# Patient Record
Sex: FEMALE | Race: WHITE | NOT HISPANIC OR LATINO | Employment: OTHER | URBAN - METROPOLITAN AREA
[De-identification: names, ages, dates, MRNs, and addresses within clinical notes are randomized per-mention and may not be internally consistent; named-entity substitution may affect disease eponyms.]

---

## 2017-07-24 ENCOUNTER — APPOINTMENT (OUTPATIENT)
Dept: WOMENS IMAGING | Facility: HOSPITAL | Age: 62
End: 2017-07-24

## 2017-07-24 ENCOUNTER — OFFICE VISIT (OUTPATIENT)
Dept: OBSTETRICS AND GYNECOLOGY | Age: 62
End: 2017-07-24

## 2017-07-24 ENCOUNTER — PROCEDURE VISIT (OUTPATIENT)
Dept: OBSTETRICS AND GYNECOLOGY | Age: 62
End: 2017-07-24

## 2017-07-24 DIAGNOSIS — Z11.51 SPECIAL SCREENING EXAMINATION FOR HUMAN PAPILLOMAVIRUS (HPV): ICD-10-CM

## 2017-07-24 DIAGNOSIS — Z78.0 POST-MENOPAUSAL: Primary | ICD-10-CM

## 2017-07-24 DIAGNOSIS — Z12.4 ROUTINE CERVICAL SMEAR: ICD-10-CM

## 2017-07-24 DIAGNOSIS — I10 ESSENTIAL HYPERTENSION: Primary | ICD-10-CM

## 2017-07-24 DIAGNOSIS — Z00.00 ANNUAL PHYSICAL EXAM: ICD-10-CM

## 2017-07-24 PROCEDURE — 99396 PREV VISIT EST AGE 40-64: CPT | Performed by: OBSTETRICS & GYNECOLOGY

## 2017-07-24 PROCEDURE — 77080 DXA BONE DENSITY AXIAL: CPT | Performed by: OBSTETRICS & GYNECOLOGY

## 2017-07-24 PROCEDURE — 77067 SCR MAMMO BI INCL CAD: CPT | Performed by: RADIOLOGY

## 2017-07-24 PROCEDURE — G0202 SCR MAMMO BI INCL CAD: HCPCS | Performed by: RADIOLOGY

## 2017-07-24 NOTE — PROGRESS NOTES
Mague Forte is a 62 y.o. female is being seen today for No chief complaint on file.  .    History of Present Illness  Patient is here for an annual check.  Overall she's doing well and has had the same significant other for 26 years.  Her son is 33 lives with her but is still fairly independent.  Her brother lives at 2 miles down the road and they are pretty close.  She has no particular complaints today is been a year and a half.  Nothing during that timeframe medically.  No new family history.  The following portions of the patient's history were reviewed and updated as appropriate: allergies, current medications, past family history, past medical history, past social history, past surgical history and problem list.    There were no vitals filed for this visit.    PAST MEDICAL HISTORY  No past medical history on file.  OB History     No data available        No past surgical history on file.  No family history on file.  History   Smoking Status   • Not on file   Smokeless Tobacco   • Not on file     No current outpatient prescriptions on file.    There is no immunization history on file for this patient.    Review of Systems   Constitutional: Negative for chills, fatigue, fever and unexpected weight change.   Respiratory: Negative for shortness of breath and wheezing.    Cardiovascular: Negative for chest pain.   Gastrointestinal: Negative for abdominal distention, abdominal pain, blood in stool, constipation, diarrhea and nausea.   Genitourinary: Negative for difficulty urinating, dyspareunia, dysuria, frequency, hematuria, menstrual problem, pelvic pain, urgency and vaginal discharge.   Skin: Negative for rash.       Objective   Physical Exam   Constitutional: She is oriented to person, place, and time. Vital signs are normal. She appears well-developed and well-nourished.   Neck: No thyromegaly present.   Cardiovascular: Normal rate, regular rhythm and normal heart sounds.    Pulmonary/Chest:  Effort normal. Right breast exhibits no inverted nipple, no mass, no nipple discharge, no skin change and no tenderness. Left breast exhibits no inverted nipple, no mass, no nipple discharge, no skin change and no tenderness. Breasts are symmetrical. There is no breast swelling.   Abdominal: Soft.   Genitourinary: Vagina normal and uterus normal. No breast tenderness, discharge or bleeding. Pelvic exam was performed with patient supine. No labial fusion. There is no rash, tenderness, lesion or injury on the right labia. There is no rash, tenderness, lesion or injury on the left labia. Cervix exhibits no motion tenderness, no discharge and no friability. Right adnexum displays no mass, no tenderness and no fullness. Left adnexum displays no mass, no tenderness and no fullness.   Neurological: She is alert and oriented to person, place, and time.   Psychiatric: She has a normal mood and affect.   Vitals reviewed.        Assessment/Plan   Diagnoses and all orders for this visit:    Essential hypertension    Annual physical exam    Routine cervical smear  -     IGP, Apt HPV,rfx 16 / 18,45    Special screening examination for human papillomavirus (HPV)  -     IGP, Apt HPV,rfx 16 / 18,45        The exam is normal today.  I did a Pap smear today.  Bone density was also done today and in the normal range with a slight curvature to the spine.  Recommended colonoscopy again today.  And she is on her way to get a mammogram also today.  Come back in a year.  Patient was counseled as far as diet and exercise

## 2017-07-26 LAB
CYTOLOGIST CVX/VAG CYTO: NORMAL
CYTOLOGY CVX/VAG DOC THIN PREP: NORMAL
DX ICD CODE: NORMAL
HIV 1 & 2 AB SER-IMP: NORMAL
HPV I/H RISK 4 DNA CVX QL PROBE+SIG AMP: NEGATIVE
OTHER STN SPEC: NORMAL
PATH REPORT.FINAL DX SPEC: NORMAL
STAT OF ADQ CVX/VAG CYTO-IMP: NORMAL

## 2018-01-02 ENCOUNTER — OFFICE VISIT CONVERTED (OUTPATIENT)
Dept: FAMILY MEDICINE CLINIC | Age: 63
End: 2018-01-02
Attending: FAMILY MEDICINE

## 2018-03-14 ENCOUNTER — OFFICE VISIT CONVERTED (OUTPATIENT)
Dept: FAMILY MEDICINE CLINIC | Age: 63
End: 2018-03-14
Attending: FAMILY MEDICINE

## 2018-03-27 ENCOUNTER — CONVERSION ENCOUNTER (OUTPATIENT)
Dept: FAMILY MEDICINE CLINIC | Age: 63
End: 2018-03-27

## 2018-03-28 LAB
ALBUMIN SERPL-MCNC: 4.6 G/DL
ALBUMIN/GLOB SERPL: 1.9 {RATIO}
ALP SERPL-CCNC: 74 IU/L
ALT SERPL-CCNC: 16 IU/L
AST SERPL-CCNC: 20 IU/L
BILIRUB SERPL-MCNC: 0.4 MG/DL
BUN SERPL-MCNC: 15 MG/DL
BUN/CREAT SERPL: 19
CALCIUM SERPL-MCNC: 9.9 MG/DL
CHLORIDE SERPL-SCNC: 103 MMOL/L
CHOLEST SERPL-MCNC: 180 MG/DL
CO2 SERPL-SCNC: 23 MMOL/L
CONV TOTAL PROTEIN: 7 G/DL
CREAT UR-MCNC: 0.8 MG/DL
GLOBULIN UR ELPH-MCNC: 2.4 G/DL
GLUCOSE SERPL-MCNC: 94 MG/DL
HCV AB SER DONR QL: <0.1 S/CO RATIO
HDLC SERPL-MCNC: 64 MG/DL
LDLC SERPL CALC-MCNC: 96 MG/DL
POTASSIUM SERPL-SCNC: 4.5 MMOL/L
SODIUM SERPL-SCNC: 143 MMOL/L
TRIGL SERPL-MCNC: 100 MG/DL
TSH SERPL-ACNC: 1.41 UIU/ML
VLDLC SERPL-MCNC: 20 MG/DL

## 2018-07-31 ENCOUNTER — OFFICE VISIT (OUTPATIENT)
Dept: OBSTETRICS AND GYNECOLOGY | Age: 63
End: 2018-07-31

## 2018-07-31 ENCOUNTER — APPOINTMENT (OUTPATIENT)
Dept: WOMENS IMAGING | Facility: HOSPITAL | Age: 63
End: 2018-07-31

## 2018-07-31 VITALS
SYSTOLIC BLOOD PRESSURE: 138 MMHG | DIASTOLIC BLOOD PRESSURE: 80 MMHG | HEIGHT: 64 IN | WEIGHT: 224 LBS | BODY MASS INDEX: 38.24 KG/M2

## 2018-07-31 DIAGNOSIS — Z00.00 ANNUAL PHYSICAL EXAM: Primary | ICD-10-CM

## 2018-07-31 PROCEDURE — 77063 BREAST TOMOSYNTHESIS BI: CPT | Performed by: RADIOLOGY

## 2018-07-31 PROCEDURE — G0101 CA SCREEN;PELVIC/BREAST EXAM: HCPCS | Performed by: OBSTETRICS & GYNECOLOGY

## 2018-07-31 PROCEDURE — MDREVIEWSP: Performed by: RADIOLOGY

## 2018-07-31 PROCEDURE — 77067 SCR MAMMO BI INCL CAD: CPT | Performed by: RADIOLOGY

## 2018-07-31 RX ORDER — PREDNISONE 20 MG/1
TABLET ORAL
COMMUNITY
Start: 2018-07-13 | End: 2019-10-21

## 2018-07-31 RX ORDER — LISINOPRIL 10 MG/1
10 TABLET ORAL DAILY
COMMUNITY
Start: 2018-07-19 | End: 2022-02-14 | Stop reason: SDUPTHER

## 2018-07-31 RX ORDER — EPINEPHRINE 0.3 MG/.3ML
INJECTION SUBCUTANEOUS AS NEEDED
COMMUNITY
Start: 2018-07-13

## 2018-07-31 RX ORDER — MONTELUKAST SODIUM 10 MG/1
10 TABLET ORAL AS NEEDED
COMMUNITY
Start: 2018-07-13

## 2018-07-31 RX ORDER — AMOXICILLIN 875 MG/1
TABLET, COATED ORAL
COMMUNITY
Start: 2018-06-13 | End: 2018-07-31

## 2018-07-31 RX ORDER — RANITIDINE 150 MG/1
TABLET ORAL
COMMUNITY
Start: 2018-07-13 | End: 2018-07-31

## 2018-07-31 NOTE — PROGRESS NOTES
Subjective   Bethanie Forte is a 63 y.o. female is being seen today for an annual exam.  Chief Complaint   Patient presents with   • Gynecologic Exam     last pap 2017 Dexa 2017 Colon pt says she is over due this year   .    History of Present Illness    The following portions of the patient's history were reviewed and updated as appropriate: allergies, current medications, past family history, past medical history, past social history, past surgical history and problem list.    Vitals:    07/31/18 1108   BP: 138/80       PAST MEDICAL HISTORY  History reviewed. No pertinent past medical history.  OB History     No data available        History reviewed. No pertinent surgical history.  History reviewed. No pertinent family history.  History   Smoking Status   • Never Smoker   Smokeless Tobacco   • Never Used       Current Outpatient Prescriptions:   •  amoxicillin (AMOXIL) 875 MG tablet, , Disp: , Rfl:   •  EPINEPHrine (EPIPEN) 0.3 MG/0.3ML solution auto-injector injection, , Disp: , Rfl:   •  lisinopril (PRINIVIL,ZESTRIL) 10 MG tablet, , Disp: , Rfl:   •  montelukast (SINGULAIR) 10 MG tablet, , Disp: , Rfl:   •  predniSONE (DELTASONE) 20 MG tablet, , Disp: , Rfl:   •  raNITIdine (ZANTAC) 150 MG tablet, , Disp: , Rfl:     There is no immunization history on file for this patient.    Review of Systems    Objective   Physical Exam      Assessment/Plan   There are no diagnoses linked to this encounter.

## 2018-07-31 NOTE — PROGRESS NOTES
Subjective   Bethanie Forte is a 63 y.o. female is being seen today for   Chief Complaint   Patient presents with   • Gynecologic Exam     last pap 2017 Dexa 2017 Colon pt says she is over due this year   .    History of Present Illness  Patient is here for an annual exam.  Overall she doing well and her son is also doing well working very hard right now 33 and no significant other for him.  The patient's doing well with her significant other after 27 years.  No specific complaints today and the family history Brother with Parkinson's disease.  Her bowels bladder work well no other complaints  The following portions of the patient's history were reviewed and updated as appropriate: allergies, current medications, past family history, past medical history, past social history, past surgical history and problem list.    Vitals:    07/31/18 1108   BP: 138/80       PAST MEDICAL HISTORY  History reviewed. No pertinent past medical history.  OB History     No data available        History reviewed. No pertinent surgical history.  History reviewed. No pertinent family history.  History   Smoking Status   • Never Smoker   Smokeless Tobacco   • Never Used       Current Outpatient Prescriptions:   •  EPINEPHrine (EPIPEN) 0.3 MG/0.3ML solution auto-injector injection, , Disp: , Rfl:   •  lisinopril (PRINIVIL,ZESTRIL) 10 MG tablet, , Disp: , Rfl:   •  montelukast (SINGULAIR) 10 MG tablet, , Disp: , Rfl:   •  predniSONE (DELTASONE) 20 MG tablet, , Disp: , Rfl:     There is no immunization history on file for this patient.    Review of Systems   Constitutional: Negative for chills, fatigue, fever and unexpected weight change.   Respiratory: Negative for shortness of breath and wheezing.    Cardiovascular: Negative for chest pain.   Gastrointestinal: Negative for abdominal distention, abdominal pain, blood in stool, constipation, diarrhea and nausea.   Genitourinary: Negative for difficulty urinating, dyspareunia, dysuria,  frequency, hematuria, menstrual problem, pelvic pain, urgency and vaginal discharge.   Skin: Negative for rash.       Objective   Physical Exam   Constitutional: She is oriented to person, place, and time. Vital signs are normal. She appears well-developed and well-nourished.   Neck: No thyromegaly present.   Cardiovascular: Normal rate, regular rhythm and normal heart sounds.    Pulmonary/Chest: Effort normal. Right breast exhibits no inverted nipple, no mass, no nipple discharge, no skin change and no tenderness. Left breast exhibits no inverted nipple, no mass, no nipple discharge, no skin change and no tenderness. Breasts are symmetrical. There is no breast swelling.   Abdominal: Soft.   Genitourinary: Vagina normal and uterus normal. No breast tenderness, discharge or bleeding. Pelvic exam was performed with patient supine. No labial fusion. There is no rash, tenderness, lesion or injury on the right labia. There is no rash, tenderness, lesion or injury on the left labia. Cervix exhibits no motion tenderness, no discharge and no friability. Right adnexum displays no mass, no tenderness and no fullness. Left adnexum displays no mass, no tenderness and no fullness.   Neurological: She is alert and oriented to person, place, and time.   Psychiatric: She has a normal mood and affect.   Vitals reviewed.        Assessment/Plan   Bethanie was seen today for gynecologic exam.    Diagnoses and all orders for this visit:    Annual physical exam      Normal exam today.  Pap smear was done last year so not today.  Again she is due for colonoscopy.  Mammogram was done today.  And bone density was last year.  Exercise diet discussed come back in year

## 2018-09-04 ENCOUNTER — OFFICE VISIT CONVERTED (OUTPATIENT)
Dept: FAMILY MEDICINE CLINIC | Age: 63
End: 2018-09-04
Attending: FAMILY MEDICINE

## 2019-02-25 ENCOUNTER — HOSPITAL ENCOUNTER (OUTPATIENT)
Dept: OTHER | Facility: HOSPITAL | Age: 64
Discharge: HOME | End: 2019-02-25
Attending: FAMILY MEDICINE

## 2019-02-25 LAB
ALBUMIN SERPL-MCNC: 4.4 G/DL (ref 3.5–5)
ALBUMIN/GLOB SERPL: 1.6 {RATIO} (ref 1.4–2.6)
ALP SERPL-CCNC: 76 U/L (ref 43–160)
ALT SERPL-CCNC: 18 U/L (ref 10–40)
ANION GAP SERPL CALC-SCNC: 15 MMOL/L (ref 8–19)
AST SERPL-CCNC: 19 U/L (ref 15–50)
BILIRUB SERPL-MCNC: 0.37 MG/DL (ref 0.2–1.3)
BUN SERPL-MCNC: 14 MG/DL (ref 5–25)
BUN/CREAT SERPL: 18 {RATIO} (ref 6–20)
CALCIUM SERPL-MCNC: 9.7 MG/DL (ref 8.7–10.4)
CHLORIDE SERPL-SCNC: 104 MMOL/L (ref 99–111)
CHOLEST SERPL-MCNC: 184 MG/DL (ref 107–200)
CHOLEST/HDLC SERPL: 2.6 {RATIO} (ref 3–6)
CONV CO2: 27 MMOL/L (ref 22–32)
CONV TOTAL PROTEIN: 7.2 G/DL (ref 6.3–8.2)
CREAT UR-MCNC: 0.76 MG/DL (ref 0.5–0.9)
GFR SERPLBLD BASED ON 1.73 SQ M-ARVRAT: >60 ML/MIN/{1.73_M2}
GLOBULIN UR ELPH-MCNC: 2.8 G/DL (ref 2–3.5)
GLUCOSE SERPL-MCNC: 99 MG/DL (ref 65–99)
HDLC SERPL-MCNC: 70 MG/DL (ref 40–60)
LDLC SERPL CALC-MCNC: 93 MG/DL (ref 70–100)
OSMOLALITY SERPL CALC.SUM OF ELEC: 295 MOSM/KG (ref 273–304)
POTASSIUM SERPL-SCNC: 4.2 MMOL/L (ref 3.5–5.3)
SODIUM SERPL-SCNC: 142 MMOL/L (ref 135–147)
TRIGL SERPL-MCNC: 107 MG/DL (ref 40–150)
VLDLC SERPL-MCNC: 21 MG/DL (ref 5–37)

## 2019-03-04 ENCOUNTER — OFFICE VISIT CONVERTED (OUTPATIENT)
Dept: FAMILY MEDICINE CLINIC | Age: 64
End: 2019-03-04
Attending: FAMILY MEDICINE

## 2019-09-09 ENCOUNTER — HOSPITAL ENCOUNTER (OUTPATIENT)
Dept: OTHER | Facility: HOSPITAL | Age: 64
Discharge: HOME | End: 2019-09-09
Attending: FAMILY MEDICINE

## 2019-09-09 LAB
25(OH)D3 SERPL-MCNC: 41.9 NG/ML (ref 30–100)
ALBUMIN SERPL-MCNC: 4.5 G/DL (ref 3.5–5)
ALBUMIN/GLOB SERPL: 1.7 {RATIO} (ref 1.4–2.6)
ALP SERPL-CCNC: 76 U/L (ref 43–160)
ALT SERPL-CCNC: 18 U/L (ref 10–40)
ANION GAP SERPL CALC-SCNC: 17 MMOL/L (ref 8–19)
AST SERPL-CCNC: 19 U/L (ref 15–50)
BILIRUB SERPL-MCNC: 0.27 MG/DL (ref 0.2–1.3)
BUN SERPL-MCNC: 16 MG/DL (ref 5–25)
BUN/CREAT SERPL: 23 {RATIO} (ref 6–20)
CALCIUM SERPL-MCNC: 9.9 MG/DL (ref 8.7–10.4)
CHLORIDE SERPL-SCNC: 104 MMOL/L (ref 99–111)
CHOLEST SERPL-MCNC: 184 MG/DL (ref 107–200)
CHOLEST/HDLC SERPL: 3.1 {RATIO} (ref 3–6)
CONV CO2: 25 MMOL/L (ref 22–32)
CONV TOTAL PROTEIN: 7.2 G/DL (ref 6.3–8.2)
CREAT UR-MCNC: 0.7 MG/DL (ref 0.5–0.9)
GFR SERPLBLD BASED ON 1.73 SQ M-ARVRAT: >60 ML/MIN/{1.73_M2}
GLOBULIN UR ELPH-MCNC: 2.7 G/DL (ref 2–3.5)
GLUCOSE SERPL-MCNC: 98 MG/DL (ref 65–99)
HDLC SERPL-MCNC: 59 MG/DL (ref 40–60)
LDLC SERPL CALC-MCNC: 87 MG/DL (ref 70–100)
OSMOLALITY SERPL CALC.SUM OF ELEC: 295 MOSM/KG (ref 273–304)
POTASSIUM SERPL-SCNC: 4.1 MMOL/L (ref 3.5–5.3)
SODIUM SERPL-SCNC: 142 MMOL/L (ref 135–147)
TRIGL SERPL-MCNC: 190 MG/DL (ref 40–150)
VLDLC SERPL-MCNC: 38 MG/DL (ref 5–37)

## 2019-09-17 ENCOUNTER — OFFICE VISIT CONVERTED (OUTPATIENT)
Dept: FAMILY MEDICINE CLINIC | Age: 64
End: 2019-09-17
Attending: FAMILY MEDICINE

## 2019-10-16 ENCOUNTER — TELEPHONE (OUTPATIENT)
Dept: OBSTETRICS AND GYNECOLOGY | Age: 64
End: 2019-10-16

## 2019-10-21 ENCOUNTER — APPOINTMENT (OUTPATIENT)
Dept: WOMENS IMAGING | Facility: HOSPITAL | Age: 64
End: 2019-10-21

## 2019-10-21 ENCOUNTER — OFFICE VISIT (OUTPATIENT)
Dept: OBSTETRICS AND GYNECOLOGY | Age: 64
End: 2019-10-21

## 2019-10-21 VITALS
WEIGHT: 239.4 LBS | SYSTOLIC BLOOD PRESSURE: 118 MMHG | DIASTOLIC BLOOD PRESSURE: 82 MMHG | HEIGHT: 64 IN | BODY MASS INDEX: 40.87 KG/M2

## 2019-10-21 DIAGNOSIS — N32.81 OAB (OVERACTIVE BLADDER): ICD-10-CM

## 2019-10-21 DIAGNOSIS — Z00.00 ANNUAL PHYSICAL EXAM: Primary | ICD-10-CM

## 2019-10-21 PROCEDURE — G0101 CA SCREEN;PELVIC/BREAST EXAM: HCPCS | Performed by: OBSTETRICS & GYNECOLOGY

## 2019-10-21 PROCEDURE — 77067 SCR MAMMO BI INCL CAD: CPT | Performed by: RADIOLOGY

## 2019-10-21 NOTE — PROGRESS NOTES
Subjective   Bethanie Forte is a 64 y.o. female is being seen today for   Chief Complaint   Patient presents with   • Gynecologic Exam     Pap 2017, MG 2018, DEXA 2017, C-scope DUE   .    History of Present Illness  Patient is here for her annual check.  Her boys 35 and doing well as is she.  Nothing to report this year does have an element of OAB we talked about that in basic terms.  Nothing new in the family nothing during the year no complaints today  The following portions of the patient's history were reviewed and updated as appropriate: allergies, current medications, past family history, past medical history, past social history, past surgical history and problem list.    Vitals:    10/21/19 1336   BP: 118/82       PAST MEDICAL HISTORY  History reviewed. No pertinent past medical history.  OB History     No data available        History reviewed. No pertinent surgical history.  History reviewed. No pertinent family history.  Social History     Tobacco Use   Smoking Status Never Smoker   Smokeless Tobacco Never Used       Current Outpatient Medications:   •  Calcium 150 MG tablet, Take  by mouth., Disp: , Rfl:   •  Cholecalciferol (VITAMIN D3) 1000 units capsule, Take  by mouth., Disp: , Rfl:   •  EPINEPHrine (EPIPEN) 0.3 MG/0.3ML solution auto-injector injection, , Disp: , Rfl:   •  lisinopril (PRINIVIL,ZESTRIL) 10 MG tablet, , Disp: , Rfl:   •  montelukast (SINGULAIR) 10 MG tablet, , Disp: , Rfl:     There is no immunization history on file for this patient.    Review of Systems   Constitutional: Negative for chills, fatigue, fever and unexpected weight change.   Respiratory: Negative for shortness of breath and wheezing.    Cardiovascular: Negative for chest pain.   Gastrointestinal: Negative for abdominal distention, abdominal pain, blood in stool, constipation, diarrhea and nausea.   Genitourinary: Negative for difficulty urinating, dyspareunia, dysuria, frequency, hematuria, menstrual problem, pelvic  pain, urgency and vaginal discharge.   Skin: Negative for rash.       Objective   Physical Exam   Constitutional: She is oriented to person, place, and time. Vital signs are normal. She appears well-developed and well-nourished.   Neck: No thyromegaly present.   Cardiovascular: Normal rate, regular rhythm and normal heart sounds.   Pulmonary/Chest: Effort normal. Right breast exhibits no inverted nipple, no mass, no nipple discharge, no skin change and no tenderness. Left breast exhibits no inverted nipple, no mass, no nipple discharge, no skin change and no tenderness. Breasts are symmetrical. There is no breast swelling.   Abdominal: Soft.   Genitourinary: Vagina normal and uterus normal. No breast tenderness, discharge or bleeding. Pelvic exam was performed with patient supine. No labial fusion. There is no rash, tenderness, lesion or injury on the right labia. There is no rash, tenderness, lesion or injury on the left labia. Cervix exhibits no motion tenderness, no discharge and no friability. Right adnexum displays no mass, no tenderness and no fullness. Left adnexum displays no mass, no tenderness and no fullness.   Neurological: She is alert and oriented to person, place, and time.   Psychiatric: She has a normal mood and affect.   Vitals reviewed.        Assessment/Plan   Bethanie was seen today for gynecologic exam.    Diagnoses and all orders for this visit:    Annual physical exam    OAB (overactive bladder)      Normal exam today.  Pap smear will be due next year.  She has had a mammogram today.  Bone density 17 good on the year or 2 and again I talked with colonoscopy as she is closer to having that done.  Talked about trying to stay active losing some weight come back in a year

## 2019-10-23 ENCOUNTER — TELEPHONE (OUTPATIENT)
Dept: OBSTETRICS AND GYNECOLOGY | Age: 64
End: 2019-10-23

## 2019-10-23 DIAGNOSIS — R92.8 ABNORMAL MAMMOGRAM: Primary | ICD-10-CM

## 2019-11-08 ENCOUNTER — APPOINTMENT (OUTPATIENT)
Dept: WOMENS IMAGING | Facility: HOSPITAL | Age: 64
End: 2019-11-08

## 2019-11-08 PROCEDURE — 77065 DX MAMMO INCL CAD UNI: CPT | Performed by: RADIOLOGY

## 2019-11-11 ENCOUNTER — TELEPHONE (OUTPATIENT)
Dept: OBSTETRICS AND GYNECOLOGY | Age: 64
End: 2019-11-11

## 2019-11-11 DIAGNOSIS — R92.8 ABNORMAL MAMMOGRAM: Primary | ICD-10-CM

## 2019-12-03 ENCOUNTER — APPOINTMENT (OUTPATIENT)
Dept: WOMENS IMAGING | Facility: HOSPITAL | Age: 64
End: 2019-12-03

## 2019-12-03 PROCEDURE — 19081 BX BREAST 1ST LESION STRTCTC: CPT | Performed by: RADIOLOGY

## 2020-03-12 ENCOUNTER — HOSPITAL ENCOUNTER (OUTPATIENT)
Dept: OTHER | Facility: HOSPITAL | Age: 65
Discharge: HOME | End: 2020-03-12
Attending: FAMILY MEDICINE

## 2020-03-12 LAB
25(OH)D3 SERPL-MCNC: 46.2 NG/ML (ref 30–100)
ALBUMIN SERPL-MCNC: 4.3 G/DL (ref 3.5–5)
ALBUMIN/GLOB SERPL: 1.7 {RATIO} (ref 1.4–2.6)
ALP SERPL-CCNC: 73 U/L (ref 43–160)
ALT SERPL-CCNC: 19 U/L (ref 10–40)
ANION GAP SERPL CALC-SCNC: 18 MMOL/L (ref 8–19)
AST SERPL-CCNC: 19 U/L (ref 15–50)
BILIRUB SERPL-MCNC: 0.25 MG/DL (ref 0.2–1.3)
BUN SERPL-MCNC: 15 MG/DL (ref 5–25)
BUN/CREAT SERPL: 19 {RATIO} (ref 6–20)
CALCIUM SERPL-MCNC: 9.8 MG/DL (ref 8.7–10.4)
CHLORIDE SERPL-SCNC: 105 MMOL/L (ref 99–111)
CHOLEST SERPL-MCNC: 164 MG/DL (ref 107–200)
CHOLEST/HDLC SERPL: 2.7 {RATIO} (ref 3–6)
CONV CO2: 22 MMOL/L (ref 22–32)
CONV TOTAL PROTEIN: 6.9 G/DL (ref 6.3–8.2)
CREAT UR-MCNC: 0.77 MG/DL (ref 0.5–0.9)
GFR SERPLBLD BASED ON 1.73 SQ M-ARVRAT: >60 ML/MIN/{1.73_M2}
GLOBULIN UR ELPH-MCNC: 2.6 G/DL (ref 2–3.5)
GLUCOSE SERPL-MCNC: 101 MG/DL (ref 65–99)
HDLC SERPL-MCNC: 61 MG/DL (ref 40–60)
LDLC SERPL CALC-MCNC: 86 MG/DL (ref 70–100)
OSMOLALITY SERPL CALC.SUM OF ELEC: 293 MOSM/KG (ref 273–304)
POTASSIUM SERPL-SCNC: 4.2 MMOL/L (ref 3.5–5.3)
SODIUM SERPL-SCNC: 141 MMOL/L (ref 135–147)
TRIGL SERPL-MCNC: 85 MG/DL (ref 40–150)
VLDLC SERPL-MCNC: 17 MG/DL (ref 5–37)

## 2020-03-25 ENCOUNTER — TELEPHONE (OUTPATIENT)
Dept: OBSTETRICS AND GYNECOLOGY | Age: 65
End: 2020-03-25

## 2020-03-25 NOTE — TELEPHONE ENCOUNTER
Pt called and would like for you to call her.  Says it's not urgent, just needs a minute of your time.    [phone]

## 2020-06-04 ENCOUNTER — OFFICE VISIT CONVERTED (OUTPATIENT)
Dept: FAMILY MEDICINE CLINIC | Age: 65
End: 2020-06-04
Attending: FAMILY MEDICINE

## 2020-06-30 ENCOUNTER — TELEPHONE (OUTPATIENT)
Dept: OBSTETRICS AND GYNECOLOGY | Age: 65
End: 2020-06-30

## 2020-06-30 DIAGNOSIS — R92.8 ABNORMAL MAMMOGRAM: Primary | ICD-10-CM

## 2020-06-30 NOTE — TELEPHONE ENCOUNTER
Please call patient and remind her she needed a follow-up mammogram in June.  I will place the order

## 2020-07-14 ENCOUNTER — APPOINTMENT (OUTPATIENT)
Dept: WOMENS IMAGING | Facility: HOSPITAL | Age: 65
End: 2020-07-14

## 2020-07-14 PROCEDURE — 77065 DX MAMMO INCL CAD UNI: CPT | Performed by: RADIOLOGY

## 2020-07-14 PROCEDURE — G0279 TOMOSYNTHESIS, MAMMO: HCPCS | Performed by: RADIOLOGY

## 2020-09-30 ENCOUNTER — TELEPHONE (OUTPATIENT)
Dept: OBSTETRICS AND GYNECOLOGY | Age: 65
End: 2020-09-30

## 2020-09-30 NOTE — TELEPHONE ENCOUNTER
Please call patient and reschedule her appt with Tessa or other mid-level as she was mistakenly scheduled with me and I do not have openings as my patients are having trouble getting in. Thank you.

## 2020-10-06 ENCOUNTER — HOSPITAL ENCOUNTER (OUTPATIENT)
Dept: OTHER | Facility: HOSPITAL | Age: 65
Discharge: HOME | End: 2020-10-06
Attending: FAMILY MEDICINE

## 2020-10-06 LAB
25(OH)D3 SERPL-MCNC: 47 NG/ML (ref 30–100)
ALBUMIN SERPL-MCNC: 4.3 G/DL (ref 3.5–5)
ALBUMIN/GLOB SERPL: 1.7 {RATIO} (ref 1.4–2.6)
ALP SERPL-CCNC: 75 U/L (ref 43–160)
ALT SERPL-CCNC: 12 U/L (ref 10–40)
ANION GAP SERPL CALC-SCNC: 15 MMOL/L (ref 8–19)
AST SERPL-CCNC: 15 U/L (ref 15–50)
BILIRUB SERPL-MCNC: 0.32 MG/DL (ref 0.2–1.3)
BUN SERPL-MCNC: 13 MG/DL (ref 5–25)
BUN/CREAT SERPL: 16 {RATIO} (ref 6–20)
CALCIUM SERPL-MCNC: 9.5 MG/DL (ref 8.7–10.4)
CHLORIDE SERPL-SCNC: 107 MMOL/L (ref 99–111)
CHOLEST SERPL-MCNC: 157 MG/DL (ref 107–200)
CHOLEST/HDLC SERPL: 2.6 {RATIO} (ref 3–6)
CONV CO2: 26 MMOL/L (ref 22–32)
CONV TOTAL PROTEIN: 6.8 G/DL (ref 6.3–8.2)
CREAT UR-MCNC: 0.82 MG/DL (ref 0.5–0.9)
GFR SERPLBLD BASED ON 1.73 SQ M-ARVRAT: >60 ML/MIN/{1.73_M2}
GLOBULIN UR ELPH-MCNC: 2.5 G/DL (ref 2–3.5)
GLUCOSE SERPL-MCNC: 97 MG/DL (ref 65–99)
HDLC SERPL-MCNC: 61 MG/DL (ref 40–60)
LDLC SERPL CALC-MCNC: 79 MG/DL (ref 70–100)
OSMOLALITY SERPL CALC.SUM OF ELEC: 296 MOSM/KG (ref 273–304)
POTASSIUM SERPL-SCNC: 4.5 MMOL/L (ref 3.5–5.3)
SODIUM SERPL-SCNC: 143 MMOL/L (ref 135–147)
TRIGL SERPL-MCNC: 84 MG/DL (ref 40–150)
VLDLC SERPL-MCNC: 17 MG/DL (ref 5–37)

## 2020-10-09 ENCOUNTER — OFFICE VISIT CONVERTED (OUTPATIENT)
Dept: FAMILY MEDICINE CLINIC | Age: 65
End: 2020-10-09
Attending: FAMILY MEDICINE

## 2020-10-14 ENCOUNTER — HOSPITAL ENCOUNTER (OUTPATIENT)
Dept: OTHER | Facility: HOSPITAL | Age: 65
Discharge: HOME | End: 2020-10-14
Attending: FAMILY MEDICINE

## 2020-10-26 ENCOUNTER — OFFICE VISIT (OUTPATIENT)
Dept: OBSTETRICS AND GYNECOLOGY | Age: 65
End: 2020-10-26

## 2020-10-26 VITALS
DIASTOLIC BLOOD PRESSURE: 78 MMHG | BODY MASS INDEX: 39.65 KG/M2 | SYSTOLIC BLOOD PRESSURE: 124 MMHG | WEIGHT: 238 LBS | HEIGHT: 65 IN

## 2020-10-26 DIAGNOSIS — Z12.31 ENCOUNTER FOR SCREENING MAMMOGRAM FOR MALIGNANT NEOPLASM OF BREAST: ICD-10-CM

## 2020-10-26 DIAGNOSIS — Z01.419 WELL WOMAN EXAM WITH ROUTINE GYNECOLOGICAL EXAM: Primary | ICD-10-CM

## 2020-10-26 PROCEDURE — G0101 CA SCREEN;PELVIC/BREAST EXAM: HCPCS | Performed by: NURSE PRACTITIONER

## 2020-10-26 NOTE — PROGRESS NOTES
Subjective     Chief Complaint   Patient presents with   • Gynecologic Exam     Annual exam, pap 07/24/2017 neg/hpv neg, Dexa 07/24/2017 M/g 06/30/2020       History of Present Illness    Pt doing well  Last pap 2017  Mammo was done in June, had biopsy of right breast and was having more frequent screening  Recommended return to normal screening in 4 months so she is now due for routine annual mammo - has this scheduled for november  Dexa normal in 2017  Pt recently did cologuard and was negative  She is postmenopausal - denies vaginal bleeding  She has no concerns or complaints today    Bethanie Forte is a 65 y.o. No obstetric history on file. who presents for annual exam.    Obstetric History:  OB History    No obstetric history on file.        Menstrual History:     No LMP recorded (lmp unknown). Patient is postmenopausal.         Current contraception: post menopausal status  History of abnormal Pap smear: no  Received Gardasil immunization: no  Perform regular self breast exam: yes - regularly  Family history of uterine or ovarian cancer: no  Family History of colon cancer: no  Family history of breast cancer: no    Mammogram: up to date.  Colonoscopy: up to date. Cologuard  DEXA: up to date.    Exercise: not active  Calcium/Vitamin D: adequate intake Takes supplements    The following portions of the patient's history were reviewed and updated as appropriate: allergies, current medications, past family history, past medical history, past social history, past surgical history and problem list.    Review of Systems   Constitutional: Negative.    Respiratory: Negative.    Cardiovascular: Negative.    Gastrointestinal: Negative.    Genitourinary: Negative.    Skin: Negative.    Psychiatric/Behavioral: Negative.            Objective   Physical Exam  Constitutional:       General: She is awake.      Appearance: Normal appearance. She is well-developed. She is obese.   HENT:      Head: Normocephalic and atraumatic.       Nose: Nose normal.   Neck:      Musculoskeletal: Normal range of motion and neck supple.      Thyroid: No thyroid mass, thyromegaly or thyroid tenderness.   Cardiovascular:      Rate and Rhythm: Normal rate and regular rhythm.      Pulses: Normal pulses.      Heart sounds: Normal heart sounds.   Pulmonary:      Effort: Pulmonary effort is normal.      Breath sounds: Normal breath sounds.   Chest:      Breasts: Breasts are symmetrical.         Right: Normal. No swelling, bleeding, inverted nipple, mass, nipple discharge, skin change or tenderness.         Left: Normal. No swelling, bleeding, inverted nipple, mass, nipple discharge, skin change or tenderness.   Abdominal:      General: Abdomen is flat. Bowel sounds are normal.      Palpations: Abdomen is soft.      Tenderness: There is no abdominal tenderness.   Genitourinary:     General: Normal vulva.      Labia:         Right: No rash, tenderness, lesion or injury.         Left: No rash, tenderness, lesion or injury.       Vagina: Normal. No signs of injury. No vaginal discharge, erythema, tenderness, bleeding, lesions or prolapsed vaginal walls.      Cervix: No discharge, friability, lesion, erythema or cervical bleeding.      Uterus: Normal. Not enlarged, not tender and no uterine prolapse.       Adnexa: Right adnexa normal and left adnexa normal.        Right: No mass, tenderness or fullness.          Left: No mass, tenderness or fullness.        Rectum: Normal. No mass.   Lymphadenopathy:      Upper Body:      Right upper body: No supraclavicular adenopathy.      Left upper body: No supraclavicular adenopathy.   Skin:     General: Skin is warm and dry.   Neurological:      General: No focal deficit present.      Mental Status: She is alert and oriented to person, place, and time.   Psychiatric:         Mood and Affect: Mood normal.         Behavior: Behavior normal. Behavior is cooperative.         Thought Content: Thought content normal.         Judgment:  "Judgment normal.         /78   Ht 165.1 cm (65\")   Wt 108 kg (238 lb)   LMP  (LMP Unknown)   BMI 39.61 kg/m²     Assessment/Plan   Diagnoses and all orders for this visit:    1. Well woman exam with routine gynecological exam (Primary)  -     IgP, Aptima HPV    2. Encounter for screening mammogram for malignant neoplasm of breast  -     IgP, Aptima HPV        All questions answered.  Breast self exam technique reviewed and patient encouraged to perform self-exam monthly.  Discussed healthy lifestyle modifications.  Recommended 30 minutes of aerobic exercise five times per week.  Discussed calcium needs to prevent osteoporosis.    -Pap done  -Patient had cologuard  -Dexa up to date  -Mammo scheduled for November   -F/u yearly, sooner prn             "

## 2020-10-29 LAB
CYTOLOGIST CVX/VAG CYTO: NORMAL
CYTOLOGY CVX/VAG DOC CYTO: NORMAL
CYTOLOGY CVX/VAG DOC THIN PREP: NORMAL
DX ICD CODE: NORMAL
HIV 1 & 2 AB SER-IMP: NORMAL
HPV I/H RISK 4 DNA CVX QL PROBE+SIG AMP: NEGATIVE
OTHER STN SPEC: NORMAL
STAT OF ADQ CVX/VAG CYTO-IMP: NORMAL

## 2021-03-08 ENCOUNTER — HOSPITAL ENCOUNTER (OUTPATIENT)
Dept: VACCINE CLINIC | Facility: HOSPITAL | Age: 66
Discharge: HOME | End: 2021-03-08
Attending: INTERNAL MEDICINE

## 2021-03-29 ENCOUNTER — HOSPITAL ENCOUNTER (OUTPATIENT)
Dept: VACCINE CLINIC | Facility: HOSPITAL | Age: 66
Discharge: HOME | End: 2021-03-29
Attending: INTERNAL MEDICINE

## 2021-04-14 ENCOUNTER — OFFICE VISIT CONVERTED (OUTPATIENT)
Dept: FAMILY MEDICINE CLINIC | Age: 66
End: 2021-04-14
Attending: FAMILY MEDICINE

## 2021-04-15 ENCOUNTER — HOSPITAL ENCOUNTER (OUTPATIENT)
Dept: OTHER | Facility: HOSPITAL | Age: 66
Discharge: HOME | End: 2021-04-15
Attending: FAMILY MEDICINE

## 2021-04-20 ENCOUNTER — HOSPITAL ENCOUNTER (OUTPATIENT)
Dept: OTHER | Facility: HOSPITAL | Age: 66
Discharge: HOME | End: 2021-04-20
Attending: FAMILY MEDICINE

## 2021-04-20 LAB
25(OH)D3 SERPL-MCNC: 43.5 NG/ML (ref 30–100)
ALBUMIN SERPL-MCNC: 4.3 G/DL (ref 3.5–5)
ALBUMIN/GLOB SERPL: 1.7 {RATIO} (ref 1.4–2.6)
ALP SERPL-CCNC: 76 U/L (ref 43–160)
ALT SERPL-CCNC: 15 U/L (ref 10–40)
ANION GAP SERPL CALC-SCNC: 16 MMOL/L (ref 8–19)
AST SERPL-CCNC: 16 U/L (ref 15–50)
BILIRUB SERPL-MCNC: 0.34 MG/DL (ref 0.2–1.3)
BUN SERPL-MCNC: 15 MG/DL (ref 5–25)
BUN/CREAT SERPL: 21 {RATIO} (ref 6–20)
CALCIUM SERPL-MCNC: 9.7 MG/DL (ref 8.7–10.4)
CHLORIDE SERPL-SCNC: 106 MMOL/L (ref 99–111)
CHOLEST SERPL-MCNC: 173 MG/DL (ref 107–200)
CHOLEST/HDLC SERPL: 2.5 {RATIO} (ref 3–6)
CONV CO2: 26 MMOL/L (ref 22–32)
CONV TOTAL PROTEIN: 6.9 G/DL (ref 6.3–8.2)
CREAT UR-MCNC: 0.71 MG/DL (ref 0.5–0.9)
GFR SERPLBLD BASED ON 1.73 SQ M-ARVRAT: >60 ML/MIN/{1.73_M2}
GLOBULIN UR ELPH-MCNC: 2.6 G/DL (ref 2–3.5)
GLUCOSE SERPL-MCNC: 96 MG/DL (ref 65–99)
HDLC SERPL-MCNC: 68 MG/DL (ref 40–60)
LDLC SERPL CALC-MCNC: 88 MG/DL (ref 70–100)
OSMOLALITY SERPL CALC.SUM OF ELEC: 297 MOSM/KG (ref 273–304)
POTASSIUM SERPL-SCNC: 4.6 MMOL/L (ref 3.5–5.3)
SODIUM SERPL-SCNC: 143 MMOL/L (ref 135–147)
TRIGL SERPL-MCNC: 86 MG/DL (ref 40–150)
VLDLC SERPL-MCNC: 17 MG/DL (ref 5–37)

## 2021-05-18 NOTE — PROGRESS NOTES
Bethanie Forte 1955     Office/Outpatient Visit    Visit Date: Mon, Mar 4, 2019 09:56 am    Provider: Alivia Montano MD (Assistant: Josefa Allen MA)    Location: [facility]        Electronically signed by Alivia Montano MD on  2019 10:43:28 AM                             SUBJECTIVE:        CC:     Ms. Forte is a 63 year old White female.  This is a follow-up visit.  Patient presents today for six month follow up, also has complaints of sore throat X 1 day;         HPI: Bethanie is here for routine f/u of chronic issues.        She is on lisinopril for HTN.  BP has been well controlled.  No CP, palpitations, SOB.  Recent labs looked great.        She is getting allergy shots now.  Following with [facility].  They seem to be working pretty well for her. She has had sore throat.  She woke up with it this morning.  She is worried she might be getting sick.      ROS:     CONSTITUTIONAL:  Negative for fatigue and fever.      EYES:  Negative for blurred vision.      E/N/T:  Negative for diminished hearing and nasal congestion.      CARDIOVASCULAR:  Negative for chest pain and palpitations.      RESPIRATORY:  Negative for recent cough and dyspnea.      GASTROINTESTINAL:  Negative for abdominal pain, constipation, diarrhea, nausea and vomiting.      MUSCULOSKELETAL:  Negative for arthralgias and myalgias.          PMH/FMH/SH:     Last Reviewed on 3/04/2019 10:01 AM by Alivia Montano    Past Medical History:             GYNECOLOGICAL HISTORY:             PREVENTIVE HEALTH MAINTENANCE             Hepatitis C Medicare Screening: was last done 3/2018     MAMMOGRAM: Done within last 2 years and results in are chart was last done 2018 with normal results         Surgical History:         Procedures:    Colonoscopy ( has had one less than 10 years  ago ) her GYN orders due 2017         Family History:     Father:  at age 46; Cause of death was MI     Mother:   at age 90's;  Dementia;  RLS     Brother(s): Coronary Artery Disease     Sister(s): Hypertension     Son(s): 1 son(s) total;  Hypertension         Social History:     Occupation: Homemaker     Marital Status:      Children: 1 child         Tobacco/Alcohol/Supplements:     Last Reviewed on 3/04/2019 10:01 AM by Alivia Montano    Tobacco: She has never smoked.          Substance Abuse History:     Last Reviewed on 3/04/2019 10:01 AM by Alivia Montano    None         Mental Health History:     Last Reviewed on 3/04/2019 10:01 AM by Alivia Montano        Communicable Diseases (eg STDs):     Last Reviewed on 3/04/2019 10:01 AM by Alivia Montano            Current Problems:     Last Reviewed on 9/04/2018 10:15 AM by Alivia Montano    Anxiety     Obesity, NOS     Fatigue     Hypertension         Immunizations:     Zostavax (Zoster live) 11/3/2015         Allergies:     Last Reviewed on 9/04/2018 10:15 AM by Alivia Montano      No Known Drug Allergies.         Current Medications:     Last Reviewed on 9/04/2018 10:15 AM by Alivia Montano    Prinivil 10mg Tablet Take 1 tablet(s) by mouth daily     allergy shots (clusters) started July/2018     Calcium/Vit D     Vitamin D3 2,000IU Capsules         OBJECTIVE:        Vitals:         Current: 3/4/2019 10:00:31 AM    Ht:  5 ft, 6 in;  Wt: 236.4 lbs;  BMI: 38.2    T: 98.4 F (oral);  BP: 139/63 mm Hg (left arm, sitting);  P: 100 bpm (left arm (BP Cuff), sitting);  sCr: 0.76 mg/dL;  GFR: 91.14        Exams:     PHYSICAL EXAM:     GENERAL: vital signs recorded - well developed, well nourished;  well groomed;  no apparent distress;     EYES: extraocular movements intact; conjunctiva and cornea are normal; PERRLA;     E/N/T: OROPHARYNX:  normal mucosa, dentition, gingiva, and posterior pharynx;     RESPIRATORY: normal respiratory rate and pattern with no distress; normal breath sounds with no rales, rhonchi, wheezes or rubs;     CARDIOVASCULAR: normal rate; rhythm is  regular;  no systolic murmur; no edema;     GASTROINTESTINAL: nontender; normal bowel sounds;     MUSCULOSKELETAL: normal gait; normal overall tone         ASSESSMENT           401.1   I10  Hypertension              DDx:     477.0   J30.9  Allergies              DDx:         ORDERS:         Meds Prescribed:       Refill of: Prinivil (Lisinopril) 10mg Tablet Take 1 tablet(s) by mouth daily  #90 (Ninety) tablet(s) Refills: 1         Radiology/Test Orders:       3014F  Screening mammography results documented and reviewed (PV)1  (In-House)           Lab Orders:       APPTO  Appointment need  (In-House)                   PLAN:          Hypertension BP at goal.  Labs look great.  Refills sent.  RTC 6 months for AWV.     MIPS Vaccines Flu and Pneumonia updated in Shot record     MAMMOGRAM: Done within last 2 years and results in are chart     FOLLOW-UP: Schedule a follow-up visit in 6 months..  Medicare wellness 30 min with Charmaine           Prescriptions:       Refill of: Prinivil (Lisinopril) 10mg Tablet Take 1 tablet(s) by mouth daily  #90 (Ninety) tablet(s) Refills: 1           Orders:       APPTO  Appointment need  (In-House)         3014F  Screening mammography results documented and reviewed (PV)1  (In-House)             Patient Education Handouts:       [facility] Medication Compliance           Allergies Doing well with allergy shots.  She does have a sore throat today.  Likely allergies vs viral, but she may call me back if sx have not started to improve by next Monday.  Continue to monitor.             Patient Recommendations:        For  Hypertension:     Schedule a follow-up visit in 6 months.                APPOINTMENT INFORMATION:        Monday Tuesday Wednesday Thursday Friday Saturday Sunday            Time:___________________AM  PM   Date:_____________________             CHARGE CAPTURE           **Please note: ICD descriptions below are intended for billing purposes only and may not represent clinical  diagnoses**        Primary Diagnosis:         401.1 Hypertension            I10    Essential (primary) hypertension              Orders:          09275   Office/outpatient visit; established patient, level 4  (In-House)             APPTO   Appointment need  (In-House)             3014F   Screening mammography results documented and reviewed (PV)1  (In-House)           477.0 Allergies            J30.9    Allergic rhinitis, unspecified

## 2021-05-18 NOTE — PROGRESS NOTES
Bethanie Forte 1955     Office/Outpatient Visit    Visit Date: Tue, Sep 4, 2018 10:07 am    Provider: Alivia Montano MD (Assistant: Bethanie Caba MA)    Location: [facility]        Electronically signed by Alivia Montano MD on  2018 10:38:50 AM                             SUBJECTIVE:        CC:     Ms. Forte is a 63 year old White female.  This is a follow-up visit.  med refill;         HPI: Bethanie is here today to follow up on chronic issues.        She is on lisinopril for HTN.  BP has been well controlled.  No CP, palpitations, SOB.        She has started allergy shots recently for chronic allergies.  Following with [facility].  She really can't tell much difference yet but she is hopeful.  Her allergies have been pretty bad this.          She is due for mammogram.        She is due for Prevnar.     ROS:     CONSTITUTIONAL:  Negative for fatigue and fever.      EYES:  Negative for blurred vision.      E/N/T:  Negative for diminished hearing and nasal congestion.      CARDIOVASCULAR:  Negative for chest pain and palpitations.      RESPIRATORY:  Negative for recent cough and dyspnea.      GASTROINTESTINAL:  Negative for abdominal pain, constipation, diarrhea, nausea and vomiting.      MUSCULOSKELETAL:  Negative for arthralgias and myalgias.          PMH/FMH/SH:     Last Reviewed on 2018 10:15 AM by Alivia Montano    Past Medical History:             GYNECOLOGICAL HISTORY:             PREVENTIVE HEALTH MAINTENANCE             Hepatitis C Medicare Screening: was last done 3/2018         Surgical History:         Procedures:    Colonoscopy ( has had one less than 10 years  ago ) her GYN orders due          Family History:     Father:  at age 46; Cause of death was MI     Mother:  at age 90's;  Dementia;  RLS     Brother(s): Coronary Artery Disease     Sister(s): Hypertension     Son(s): 1 son(s) total;  Hypertension         Social History:      Occupation: Homemaker     Marital Status:      Children: 1 child         Tobacco/Alcohol/Supplements:     Last Reviewed on 9/04/2018 10:15 AM by Alivia Montano    Tobacco: She has never smoked.          Substance Abuse History:     Last Reviewed on 9/04/2018 10:15 AM by Alivia Montano    None         Mental Health History:     Last Reviewed on 9/04/2018 10:15 AM by Alivia Montano        Communicable Diseases (eg STDs):     Last Reviewed on 9/04/2018 10:15 AM by Alivia Montano            Current Problems:     Last Reviewed on 3/14/2018 02:38 PM by Alivia Montano    Anxiety     Obesity, NOS     Fatigue     Hypertension         Immunizations:     Zostavax (Zoster) 11/3/2015         Allergies:     Last Reviewed on 9/04/2018 10:09 AM by Bethanie Caba      No Known Drug Allergies.         Current Medications:     Last Reviewed on 9/04/2018 10:09 AM by Bethanie Caba    Prinivil 10mg Tablet Take 1 tablet(s) by mouth daily     Calcium/Vit D     Vitamin D3 2,000IU Capsules     allergy shots (clusters) started July/2018         OBJECTIVE:        Vitals:         Current: 9/4/2018 10:13:27 AM    Ht:  5 ft, 6 in;  Wt: 225.8 lbs;  BMI: 36.4    T: 98.1 F (oral);  BP: 113/48 mm Hg (left arm, sitting);  P: 72 bpm (left arm (BP Cuff), sitting);  sCr: 0.75 mg/dL;  GFR: 90.57        Exams:     PHYSICAL EXAM:     GENERAL: vital signs recorded - well developed, well nourished;  well groomed;  no apparent distress;     EYES: extraocular movements intact; conjunctiva and cornea are normal; PERRLA;     E/N/T: OROPHARYNX:  normal mucosa, dentition, gingiva, and posterior pharynx;     RESPIRATORY: normal respiratory rate and pattern with no distress; normal breath sounds with no rales, rhonchi, wheezes or rubs;     CARDIOVASCULAR: normal rate; rhythm is regular;  no systolic murmur; no edema;     GASTROINTESTINAL: nontender; normal bowel sounds;     MUSCULOSKELETAL: normal gait; normal overall tone         ASSESSMENT            401.1   I10  Hypertension              DDx:     300.02   F41.9  Anxiety              DDx:     477.0   J30.9  Allergies              DDx:         ORDERS:         Meds Prescribed:       Refill of: Prinivil (Lisinopril)  10mg Tablet Take 1 tablet(s) by mouth daily  #90 (Ninety) tablet(s) Refills: 1         Lab Orders:       APPTO  Appointment need  (In-House)                   PLAN:          Hypertension BP at goal.  Refills sent.  Labs UTD. RTC 6 months for Welcome to Medicare visit.         FOLLOW-UP: Schedule a follow-up visit in 6 months..  Welcome to Medicare 30 min with Charmaine           Prescriptions:       Refill of: Prinivil (Lisinopril)  10mg Tablet Take 1 tablet(s) by mouth daily  #90 (Ninety) tablet(s) Refills: 1           Orders:       APPTO  Appointment need  (In-House)             Patient Education Handouts:       [facility] Medication Compliance           Anxiety No issues.  Not currently using meds.          Allergies She would like to hold off on Prevnar today.  Will come back for this once she has completed her allergy shot clusters in 2 weeks.  Follows with [facility].             Patient Recommendations:        For  Hypertension:     Schedule a follow-up visit in 6 months.                APPOINTMENT INFORMATION:        Monday Tuesday Wednesday Thursday Friday Saturday Sunday            Time:___________________AM  PM   Date:_____________________             CHARGE CAPTURE           **Please note: ICD descriptions below are intended for billing purposes only and may not represent clinical diagnoses**        Primary Diagnosis:         401.1 Hypertension            I10    Essential (primary) hypertension              Orders:          86404   Office/outpatient visit; established patient, level 4  (In-House)             APPTO   Appointment need  (In-House)           300.02 Anxiety            F41.9    Anxiety disorder, unspecified    477.0 Allergies            J30.9    Allergic  rhinitis, unspecified

## 2021-05-18 NOTE — PROGRESS NOTES
Bethanie Forte 1955     Office/Outpatient Visit    Visit Date: Tue, Sep 17, 2019 10:20 am    Provider: Alivia Montano MD (Assistant: Josefa Allen MA)    Location: [facility]        Electronically signed by Alivia Montano MD on  09/17/2019 11:58:30 AM                             SUBJECTIVE:        CC:     Ms. Forte is a 64 year old White female.  Patient presents today for [facility] visit;         HPI:     She is due for colonoscopy.  Pap smear is no longer indicated by age and history.  She is due for mammogram, last done 7/2018 and this was normal.  She is reportedly UTD on DEXA, done last year and this was normal.  She is UTD on Zostavax (11/2015).  She is due for Prevnar, Pneumovax, Shingrix, Havrix, and Td.  Flu shot not yet indicated.  She is UTD on routine labs.     Ms. Forte is here for a Medicare wellness visit.  ADVANCE DIRECTIVES (Please update in PMH): Living will Returning to health checkup, the required HRA questions are integrated within this visit note. Family medical history and individual medical/surgical history were reviewed and updated.  A current height, weight, BMI, blood pressure, and pulse were recorded in the vitals section of the note and have been reviewed. Patient's medications, including supplements, were recorded in the chart and reviewed.  Current providers and suppliers were reviewed and updated.          Self-Assessment of Health: She rates her health as very good. She rates her confidence of being able to control/manage most of her health problems as very confident. Her physical/emotional health has limited her social activites not at all.  A review of cognitive impairment was performed, including ability to drive a car, manage finances, and any memory changes, and was found to be negative.  A review of functional ability, including bathing, dressing, walking, and urine/bowel continence as well as level of safety was performed and was found to be negative.   Falls Risk: Has not had any falls or only one fall without injury in the past year.  She denies having trouble hearing the TV/radio when others do not, having to strain to hear or understand conversations and wearing hearing aid(s).  Concerning home safety, she reports that at home she DOES have adequate lighting, a skid resistant shower/tub, handrails on stairs, functioning smoke alarms and absence of throw rugs, but not grab bars in the bath.          Immunization Status: ** >10 years since last Td booster; ** Has not received pneumococcal vaccination; ** Has not received influenza vaccine for this season; ** Has not received Prevnar 13 vaccination; Physical Activity: She exercises but less than 20 minutes 3 days per week; Type of diet patient normally eats is described as well-balanced with fruits and vegetables Tobacco: She has never smoked.  Preventative Health updated today         PHQ-9 Depression Screening: Completed form scanned and in chart; Total Score 3     ROS:     CONSTITUTIONAL:  Negative for fatigue and fever.      EYES:  Negative for blurred vision.      E/N/T:  Negative for diminished hearing and nasal congestion.      CARDIOVASCULAR:  Negative for chest pain and palpitations.      RESPIRATORY:  Negative for recent cough and dyspnea.      GASTROINTESTINAL:  Negative for abdominal pain, constipation, diarrhea, nausea and vomiting.      GENITOURINARY:  Negative for dysuria and urinary incontinence.      MUSCULOSKELETAL:  Negative for arthralgias and myalgias.      NEUROLOGICAL:  Negative for paresthesias and weakness.      PSYCHIATRIC:  Negative for anxiety, depression and sleep disturbance.          PMH/FMH/SH:     Last Reviewed on 2019 10:43 AM by Alivia Montano    Past Medical History:             GYNECOLOGICAL HISTORY:             PREVENTIVE HEALTH MAINTENANCE             Hepatitis C Medicare Screening: was last done 3/2018     MAMMOGRAM: Done within last 2 years and results in are  chart was last done 2018 with normal results     PAP SMEAR: was last done 2018 - Dr. Erwin Baig with normal results         Surgical History:         Procedures:    Colonoscopy ( has had one less than 10 years  ago ) her GYN orders due 2017         Family History:     Father:  at age 46; Cause of death was MI     Mother:  at age 90's;  Dementia;  RLS     Brother(s): Coronary Artery Disease     Sister(s): Hypertension     Son(s): 1 son(s) total;  Hypertension         Social History:     Occupation: Homemaker     Marital Status:      Children: 1 child         Tobacco/Alcohol/Supplements:     Last Reviewed on 2019 10:43 AM by Alivia Montano    Tobacco: She has never smoked.          Substance Abuse History:     Last Reviewed on 2019 10:43 AM by Alivia Montano    None         Mental Health History:     Last Reviewed on 2019 10:43 AM by Alivia Montano        Communicable Diseases (eg STDs):     Last Reviewed on 2019 10:43 AM by Alivia Montano            Current Problems:     Last Reviewed on 3/04/2019 10:01 AM by Alivia Montano    Vitamin D deficiency, unspecified     Anxiety     Obesity, NOS     Fatigue     Hypertension         Immunizations:     Zostavax (Zoster live) 11/3/2015         Allergies:     Last Reviewed on 3/04/2019 10:01 AM by Alivia Montano      No Known Drug Allergies.         Current Medications:     Last Reviewed on 3/04/2019 10:01 AM by Alivia Montano    Prinivil 10mg Tablet Take 1 tablet(s) by mouth daily     allergy shots (clusters) started 2018     Calcium/Vit D     Vitamin D3 2,000IU Capsules         OBJECTIVE:        Vitals:         Current: 2019 10:25:00 AM    Ht:  5 ft, 6 in;  Wt: 240.4 lbs;  BMI: 38.8    T: 98.5 F (oral);  BP: 145/49 mm Hg (left arm, sitting);  P: 79 bpm (left arm (BP Cuff), sitting);  sCr: 0.7 mg/dL;  GFR: 98.37    VA: 20/25 OD, 20/25 OS (near, with correction)        Repeat:     11:22:50 AM     BP:   140/64mm Hg  (left arm, sitting)         Exams:     PHYSICAL EXAM:     GENERAL: vital signs recorded - well developed, well nourished;  well groomed;  no apparent distress;     EYES: extraocular movements intact; conjunctiva and cornea are normal; PERRLA;     E/N/T: OROPHARYNX:  normal mucosa, dentition, gingiva, and posterior pharynx;     RESPIRATORY: normal respiratory rate and pattern with no distress; normal breath sounds with no rales, rhonchi, wheezes or rubs;     CARDIOVASCULAR: normal rate; rhythm is regular;  no systolic murmur; no edema;     GASTROINTESTINAL: nontender; normal bowel sounds;     MUSCULOSKELETAL: normal gait; normal overall tone     NEUROLOGIC: mental status: alert and oriented x 3; Reflexes: brachioradialis: 2+; knee jerks: 2+;     PSYCHIATRIC: appropriate affect and demeanor; normal psychomotor function; normal speech pattern;         ASSESSMENT           V70.0   Z00.00  Health checkup              DDx:     V79.0   Z13.31  Screening for depression              DDx:     V76.12   Z12.31  Screening mammogram - other              DDx:     627.9   N95.9  Postmenopausal disorder              DDx:     V76.51   Z12.11  Screening for cancer of colon              DDx:     V03.82   Z23  Vaccination against pneumococcal pneumonia              DDx:         ORDERS:         Meds Prescribed:       Refill of: Prinivil (Lisinopril) 10mg Tablet Take 1 tablet(s) by mouth daily  #90 (Ninety) tablet(s) Refills: 1       Betamethasone/Clotrimazole 0.05%/1% Cream Apply small amount to affected area bid  #15 (Fifteen) gm Refills: 0         Radiology/Test Orders:       3014F  Screening mammography results documented and reviewed (PV)1  (In-House)           Procedures Ordered:         Annual wellness visit, includes a PPPS, first visit  (In-House)           Other Orders:         Depression screen negative  (In-House)         1101F  Pt screen for fall risk; document no falls in past year or only 1 fall w/o injury in past  year (NAVARRO)  (In-House)                   PLAN:          Health checkup She is due for colonoscopy; she will get this scheduled herself.  Pap smear is no longer indicated by age and history.  She is due for mammogram, last done 7/2018 and this was normal; scheduled for Oct.  She is UTD on DEXA.  She is UTD on Zostavax (11/2015).  She is due for Prevnar, Pneumovax, Shingrix, Havrix, and Td; wants to come back next week for flu and then the following week for Prevnar.  Will get Pneumovax in 1 year.  Flu shot not yet indicated.  She is UTD on routine labs.  No fall risk, no memory issues, no signs/symptoms of depression.  She lives with her .  She is able to drive and perform ADLs/manage finances independently.  Hearing is adequate.  She has a living will and preventive services handout and safety handout were given to her.  Current doctor list updated.  RTC 6 months.     MIPS PHQ-9 Depression Screening: Completed form scanned and in chart; Total Score 3; Negative Depression Screen           Orders:         Annual wellness visit, includes a PPPS, first visit  (In-House)           Depression screen negative  (In-House)         1101F  Pt screen for fall risk; document no falls in past year or only 1 fall w/o injury in past year (NAVARRO)  (In-House)         3014F  Screening mammography results documented and reviewed (PV)1  (In-House)            Screening mammogram - other Already scheduled for Oct.          Postmenopausal disorder Done by Dr. Livingston.          Screening for cancer of colon Follows with Dr. Pennington.  She wants to schedule this herself.          Vaccination against pneumococcal pneumonia Will come back in a couple of weeks to get this (wants to get flu shot next week and had allergy shot this week).             Other Prescriptions:       Betamethasone/Clotrimazole 0.05%/1% Cream Apply small amount to affected area bid  #15 (Fifteen) gm Refills: 0       Refill of: Prinivil (Lisinopril) 10mg  Tablet Take 1 tablet(s) by mouth daily  #90 (Ninety) tablet(s) Refills: 1         CHARGE CAPTURE           **Please note: ICD descriptions below are intended for billing purposes only and may not represent clinical diagnoses**        Primary Diagnosis:         V70.0 Health checkup            Z00.00    Encounter for general adult medical examination without abnormal findings              Orders:             Annual wellness visit, includes a PPPS, first visit  (In-House)                Depression screen negative  (In-House)             1101F   Pt screen for fall risk; document no falls in past year or only 1 fall w/o injury in past year (NAVARRO)  (In-House)             3014F   Screening mammography results documented and reviewed (PV)1  (In-House)           V79.0 Screening for depression            Z13.31    Encounter for screening for depression    V76.12 Screening mammogram - other            Z12.31    Encounter for screening mammogram for malignant neoplasm of breast    627.9 Postmenopausal disorder            N95.9    Unspecified menopausal and perimenopausal disorder    V76.51 Screening for cancer of colon            Z12.11    Encounter for screening for malignant neoplasm of colon    V03.82 Vaccination against pneumococcal pneumonia            Z23    Encounter for immunization

## 2021-05-18 NOTE — PROGRESS NOTES
Bethanie Forte. 1955     Office/Outpatient Visit    Visit Date: Wed, Mar 14, 2018 01:56 pm    Provider: Alivia Montano MD (Assistant: Hanna Lua)    Location: [facility]        Electronically signed by Alivia Montano MD on  2018 03:01:02 PM                             SUBJECTIVE:        CC:     Ms. Forte is a 62 year old White female.  establish with Dr. Montano;         HPI: Bethanie is here today to establish care with me.        She is on lisinopril for HTN.  BP has been well controlled.  No CP, palpitations, SOB.         She has some anxiety and would like to try some medication.  She feels claustrophobic at times.  She does not want to take something every day.     ROS:     CONSTITUTIONAL:  Negative for fatigue and fever.      EYES:  Negative for blurred vision.      E/N/T:  Negative for diminished hearing and nasal congestion.      CARDIOVASCULAR:  Negative for chest pain and palpitations.      RESPIRATORY:  Negative for recent cough and dyspnea.      GASTROINTESTINAL:  Negative for abdominal pain, constipation, diarrhea, nausea and vomiting.      MUSCULOSKELETAL:  Negative for arthralgias and myalgias.      NEUROLOGICAL:  Negative for paresthesias and weakness.      PSYCHIATRIC:  Positive for anxiety.   Negative for depression or sleep disturbance.          PMH/FMH/SH:     Last Reviewed on 3/14/2018 02:38 PM by Alivia Montano    Past Medical History:             GYNECOLOGICAL HISTORY:             Surgical History:         Procedures:    Colonoscopy ( has had one less than 10 years  ago ) her GYN orders due 2017         Family History:     Father:  at age 46; Cause of death was MI     Mother:  at age 90's;  Dementia;  RLS     Brother(s): Coronary Artery Disease     Sister(s): Hypertension     Son(s): 1 son(s) total;  Hypertension         Social History:     Occupation: Homemaker     Marital Status:      Children: 1 child         Tobacco/Alcohol/Supplements:      Last Reviewed on 3/14/2018 02:38 PM by Alivia Montano    Tobacco: She has never smoked.          Substance Abuse History:     Last Reviewed on 3/14/2018 02:38 PM by Alivia Montano    None         Mental Health History:     Last Reviewed on 3/14/2018 02:38 PM by Alivia Montano        Communicable Diseases (eg STDs):     Last Reviewed on 3/14/2018 02:38 PM by Alivia Montano            Current Problems:     Dizziness     Obesity, NOS     Fatigue     Hypertension     Dry mouth         Immunizations:     Zostavax (Zoster) 11/3/2015         Allergies:     Last Reviewed on 3/14/2018 02:00 PM by Hanna Lua      No Known Drug Allergies.         Current Medications:     Last Reviewed on 3/14/2018 02:00 PM by Hanna Lua    Prinivil 10mg Tablet Take 1 tablet(s) by mouth daily     Calcium/Vit D     Vitamin D3 2,000IU Capsules         OBJECTIVE:        Vitals:         Current: 3/14/2018 1:59:17 PM    Ht:  5 ft, 6 in;  Wt: 230 lbs;  BMI: 37.1    T: 98.5 F (oral);  BP: 122/64 mm Hg (left arm, sitting);  P: 85 bpm (finger clip, sitting);  sCr: 0.81 mg/dL;  GFR: 85.61        Exams:     PHYSICAL EXAM:     GENERAL: vital signs recorded - well developed, well nourished;  well groomed;  no apparent distress;     EYES: extraocular movements intact; conjunctiva and cornea are normal; PERRLA;     E/N/T: OROPHARYNX:  normal mucosa, dentition, gingiva, and posterior pharynx;     RESPIRATORY: normal respiratory rate and pattern with no distress; normal breath sounds with no rales, rhonchi, wheezes or rubs;     CARDIOVASCULAR: normal rate; rhythm is regular;  no systolic murmur; no edema;     GASTROINTESTINAL: nontender; normal bowel sounds;     MUSCULOSKELETAL: normal gait; normal overall tone         ASSESSMENT           401.1   I10  Hypertension              DDx:     300.02   F41.9  Anxiety              DDx:     V73.99   Z11.59  Screening test for viral disease - unspecified              DDx:         ORDERS:         Meds  Prescribed:       Refill of: Prinivil (Lisinopril) 10mg Tablet Take 1 tablet(s) by mouth daily  #90 (Ninety) tablet(s) Refills: 1       Hydroxyzine HCl 10mg Tablet 1 tab bid prn for anxiety  #30 (Thirty) tablet(s) Refills: 0         Lab Orders:       [id number]  [id number] [facility] Comp Metabolic Panel (14)  (Send-Out)         [id number]  [id number] [facility] Lipid Panel (Excludes LDL/HDL ratio)  (Send-Out)         72940  [id number]  (Send-Out)         [id number]  [id number] [facility] TSH  (Send-Out)                   PLAN:          Hypertension BP at goal.  Refills sent.  Checking labs.  RTC 6 months.     LABORATORY:  Labs ordered to be performed today at [facility] include.  CMP Lipid panel TSH     FOLLOW-UP: Schedule a follow-up visit in 6 months..            Prescriptions:       Refill of: Prinivil (Lisinopril) 10mg Tablet Take 1 tablet(s) by mouth daily  #90 (Ninety) tablet(s) Refills: 1           Orders:       [id number]  [id number] [facility] Comp Metabolic Panel (14)  (Send-Out)         [id number]  [id number] [facility] Lipid Panel (Excludes LDL/HDL ratio)  (Send-Out)         02086  [id number] [facility] TSH  (Send-Out)             Patient Education Handouts:       [facility] [place]           Anxiety Trial of hydroxyzine prn.  Anxiety is fairly mild and infrequent.           Prescriptions:       Hydroxyzine HCl 10mg Tablet 1 tab bid prn for anxiety  #30 (Thirty) tablet(s) Refills: 0          Screening test for viral disease - unspecified     LABORATORY:  Labs ordered to be performed today at [facility] include Hepatitis C Screening.            Orders:       [id number]  [id number] [facility] Hepatitic C (HCV) Antibody Medicare screen  (Send-Out)               Patient Recommendations:        For  Hypertension:     Schedule a follow-up visit in 6 months.                APPOINTMENT INFORMATION:        Monday Tuesday Wednesday Thursday Friday Saturday Sunday            Time:___________________AM  PM   Date:_____________________              CHARGE CAPTURE           **Please note: ICD descriptions below are intended for billing purposes only and may not represent clinical diagnoses**        Primary Diagnosis:         401.1 Hypertension            I10    Essential (primary) hypertension              Orders:          [id number]   Office/outpatient visit; established patient, level 4  (In-House)           300.02 Anxiety            F41.9    Anxiety disorder, unspecified    V73.99 Screening test for viral disease - unspecified            Z11.59    Encounter for screening for other viral diseases

## 2021-05-18 NOTE — PROGRESS NOTES
Bethanie Forte A  1955     Office/Outpatient Visit    Visit Date:  09:36 am    Provider: Alivia Montano MD (Assistant: Nakia Reich MA)    Location: [facility]        Electronically signed by Alivia Montano MD on  2020 10:12:44 AM                             Subjective:        CC: [phone] - dox video Ms. Forte is a 64 year old White female.  This is a follow-up visit.  check up;         HPI: Bethanie is here today to follow up on chronic issues.        She is on lisinopril for HTN.  BP has been well controlled.  No CP, palpitations, SOB.  Recent labs done looked good.        She is getting allergy shots weekly, following with [facility].          She is on vitamin D supplement (as well as Jose A/vit D) for vit D deficiency.          PHQ-9 Depression Screening: Completed form scanned and in chart; Total Score 2     ROS:     CONSTITUTIONAL:  Negative for fatigue and fever.      EYES:  Negative for blurred vision.      E/N/T:  Negative for diminished hearing and nasal congestion.      CARDIOVASCULAR:  Negative for chest pain and palpitations.      RESPIRATORY:  Negative for recent cough and dyspnea.      GASTROINTESTINAL:  Negative for abdominal pain, constipation, diarrhea, nausea and vomiting.      GENITOURINARY:  Negative for dysuria and urinary incontinence.      MUSCULOSKELETAL:  Negative for arthralgias and myalgias.      NEUROLOGICAL:  Negative for paresthesias and weakness.      PSYCHIATRIC:  Negative for anxiety, depression and sleep disturbance.          Past Medical History / Family History / Social History:         Last Reviewed on 2020 09:43 AM by Alivia Montano    Past Medical History:             GYNECOLOGICAL HISTORY:             PREVENTIVE HEALTH MAINTENANCE             Hepatitis C Medicare Screening: was last done 3/2018     MAMMOGRAM: Done within last 2 years and results in are chart was last done 2019 with the  following abnormalities noted-- R breast dxtic mammo -- BIRADS 4, suspicious calcfications -- bx recommended: benign fibroadenoma The next one is due  R breast dxtic mammo tickled for 6 months     PAP SMEAR: was last done 2018 - Dr. Erwin Baig with normal results         Surgical History:         Procedures:    Colonoscopy ( has had one less than 10 years  ago ) her GYN orders due 2017         Family History:     Father:  at age 46; Cause of death was MI     Mother:  at age 90's;  Dementia;  RLS     Brother(s): Coronary Artery Disease     Sister(s): Hypertension     Son(s): 1 son(s) total;  Hypertension         Social History:     Occupation: Homemaker     Marital Status:      Children: 1 child         Tobacco/Alcohol/Supplements:     Last Reviewed on 2020 09:43 AM by Alivia Montano    Tobacco: She has never smoked.          Substance Abuse History:     Last Reviewed on 2020 09:43 AM by Alivia Montano    None         Mental Health History:     Last Reviewed on 2020 09:43 AM by Alivia Montano        Communicable Diseases (eg STDs):     Last Reviewed on 2020 09:43 AM by Alivia Montano        Current Problems:     Last Reviewed on 2019 10:43 AM by Alivia Montano    HTN - Essential (primary) hypertension    Other fatigue    Anxiety disorder, unspecified    Vitamin D deficiency, unspecified        Immunizations:     Zostavax (Zoster live) 11/3/2015        Allergies:     Last Reviewed on 2019 10:43 AM by Alivia Montano    No Known Allergies.        Current Medications:     Last Reviewed on 2019 10:43 AM by Alivia Montano    Prinivil 10 mg oral tablet [Take 1 tablet(s) by mouth daily]    Calcium/Vit D      Vitamin D3 50 mcg (2,000 unit) oral capsule    allergy shots (clusters) started 2018         Objective:        Exams:     PHYSICAL EXAM:     GENERAL:  well developed and nourished; appropriately groomed; in no apparent distress;     EYES: extraocular  movements intact; conjunctiva and cornea are normal;     RESPIRATORY: normal respiratory rate and pattern with no distress;     MUSCULOSKELETAL: normal overall tone     NEUROLOGIC: mental status: alert and oriented x 3;     PSYCHIATRIC: appropriate affect and demeanor; normal psychomotor function; normal speech pattern;         Assessment:         I10   HTN - Essential (primary) hypertension       J30.9   Allergic rhinitis, unspecified       Z13.31   Encounter for screening for depression       Z12.11   Encounter for screening for malignant neoplasm of colon           ORDERS:         Meds Prescribed:       [Refilled] Prinivil 10 mg oral tablet [Take 1 tablet(s) by mouth daily], #90 (ninety) tablets, Refills: 2 (two)         Lab Orders:       APPTO  Appointment need  (In-House)              Other Orders:         Depression screen negative  (In-House)              Screening mammogram results documented  (Send-Out)            1124F  Advance Care Planning discussed and doc in MR; no surrogate named or advance care plan provided  (Send-Out)                      Plan:         HTN - Essential (primary) hypertensionBP at goal.  Refills sent.  Labs reviewed and UTD.  RTC 4 months for AWV.    [facility] PHQ-9 Depression Screening: Completed form scanned and in chart; Total Score 2; Negative Depression Screen Telehealth: Verbal consent obtained for visit to occur via televideo conferencing; Staff, other than provider, present during telephone visit include Nakia Reich MA     FOLLOW-UP: Schedule a follow-up visit in 4 months.:.  Medicare wellness 30 min with [place]          Prescriptions:       [Refilled] Prinivil 10 mg oral tablet [Take 1 tablet(s) by mouth daily], #90 (ninety) tablets, Refills: 2 (two)           Orders:         Depression screen negative  (In-House)              Screening mammogram results documented  (Send-Out)            1124F  Advance Care Planning discussed and doc in MR; no surrogate  named or advance care plan provided  (Send-Out)            APPTO  Appointment need  (In-House)              Allergic rhinitis, unspecifiedStable.  Follows for allergy shots with [facility].        Encounter for screening for malignant neoplasm of colonJessica is nervous about going in right now, but we discussed that the summer is probably a good time to get this done.  She will think about it and can call for a referral at any time.            Patient Recommendations:        For  HTN - Essential (primary) hypertension:    Schedule a follow-up visit in 4 months.                APPOINTMENT INFORMATION:        Monday Tuesday Wednesday Thursday Friday Saturday Sunday            Time:___________________AM  PM   Date:_____________________             Charge Capture:         Primary Diagnosis:     I10  HTN - Essential (primary) hypertension           Orders:      99962  Office/outpatient visit; established patient, level 4  (In-House)              Depression screen negative  (In-House)            APPTO  Appointment need  (In-House)              J30.9  Allergic rhinitis, unspecified     Z13.31  Encounter for screening for depression     Z12.11  Encounter for screening for malignant neoplasm of colon

## 2021-05-18 NOTE — PROGRESS NOTES
"Bethanie Forte A  1955     Office/Outpatient Visit    Visit Date:  09:20 am    Provider: Alivia Montano MD (Assistant: Angelique Gaona RN)    Location: [facility]        Electronically signed by Alivia Montano MD on  2021 10:42:28 AM                             Subjective:        CC: Ms. Forte is a 65 year old White female.  6 month follow up;         HPI: Bethanie is here today for f/u on chronic issues.        She is on lisinopril for HTN.  BP has been well controlled.  No CP, palpitations, SOB.  Recent labs done looked good.        She is getting allergy shots weekly, following with [facility].          She is on vitamin D supplement (as well as Jose A/vit D) for vit D deficiency.    ROS:     CONSTITUTIONAL:  Positive for fatigue.   Negative for fever.      EYES:  Negative for blurred vision.      E/N/T:  Negative for diminished hearing and nasal congestion.      CARDIOVASCULAR:  Negative for chest pain and palpitations.      RESPIRATORY:  Positive for recent cough ( (improving) ).   Negative for dyspnea.      GASTROINTESTINAL:  Negative for abdominal pain, constipation, diarrhea, nausea and vomiting.      MUSCULOSKELETAL:  Negative for arthralgias and myalgias.      NEUROLOGICAL:  Positive for headaches ( \"sinus\" ).          Past Medical History / Family History / Social History:         Last Reviewed on 2021 09:29 AM by Alivia Montano    Past Medical History:             GYNECOLOGICAL HISTORY:             PREVENTIVE HEALTH MAINTENANCE             COLORECTAL CANCER SCREENING: Up to date (colonoscopy q10y; sigmoidoscopy q5y; Cologuard q3y) was last done 10/13/2020, Results are in chart; FIT Hemocult performed: negative 10/13/20     Hepatitis C Medicare Screening: was last done 3/2018     MAMMOGRAM: Done within last 2 years and results in are chart was last done 2019 screening; 2020 diagnostic with normal results The next one is " due  2020     PAP SMEAR: was last done 2018 - Dr. Erwin Baig with normal results         Surgical History:         Procedures:    Colonoscopy ( has had one less than 10 years  ago ) her GYN orders due          Family History:     Father:  at age 46; Cause of death was MI     Mother:  at age 90's;  Dementia;  RLS     Brother(s): Coronary Artery Disease     Sister(s): Hypertension     Son(s): 1 son(s) total;  Hypertension         Social History:     Occupation: Homemaker     Marital Status:      Children: 1 child         Tobacco/Alcohol/Supplements:     Last Reviewed on 2021 09:29 AM by Alivia Montano    Tobacco: She has never smoked.          Substance Abuse History:     Last Reviewed on 2021 09:29 AM by Alivia Montano    None         Mental Health History:     Last Reviewed on 2021 09:29 AM by Alivia Montano        Communicable Diseases (eg STDs):     Last Reviewed on 2021 09:29 AM by Alivia Montano        Current Problems:     Last Reviewed on 10/09/2020 02:01 PM by Alivia Montano    HTN - Essential (primary) hypertension    Other fatigue    Anxiety disorder, unspecified    Vitamin D deficiency, unspecified    Allergic rhinitis, unspecified    Encounter for screening for malignant neoplasm of colon    Other abnormal and inconclusive findings on diagnostic imaging of breast    Encounter for general adult medical examination without abnormal findings    Encounter for immunization    Encounter for other screening for malignant neoplasm of breast        Immunizations:     Pneumococcal conjugate PCV 13 (PREVNAR 13) 10/9/2020    Zostavax (Zoster live) 11/3/2015        Allergies:     Last Reviewed on 2021 09:22 AM by Angelique Gaona    No Known Allergies.        Current Medications:     Last Reviewed on 10/09/2020 02:01 PM by Alivia Montano    Calcium/Vit D      Vitamin D3 50 mcg (2,000 unit) oral capsule    allergy shots (clusters) started 2018      lisinopriL 10 mg oral tablet [take 1 tablet (10 mg) by oral route once daily]        Objective:        Vitals:         Current: 4/14/2021 9:25:22 AM    Ht:  5 ft, 6 in;  Wt: 240.6 lbs;  BMI: 38.8T: 96.4 F (temporal);  BP: 128/76 mm Hg (left arm, sitting);  P: 82 bpm (left arm (BP Cuff), sitting);  sCr: 0.82 mg/dL;  GFR: 82.91        Exams:     PHYSICAL EXAM:     GENERAL: vital signs recorded - well developed, well nourished;  well groomed;  no apparent distress;     EYES: extraocular movements intact; conjunctiva and cornea are normal; PERRLA;     NECK: firm round mass inferior to R maxilla, nontender;     RESPIRATORY: normal respiratory rate and pattern with no distress; normal breath sounds with no rales, rhonchi, wheezes or rubs;     CARDIOVASCULAR: normal rate; rhythm is regular;  no systolic murmur; no edema;     GASTROINTESTINAL: nontender; normal bowel sounds;     MUSCULOSKELETAL: normal gait; normal overall tone         Assessment:         I10   HTN - Essential (primary) hypertension       E55.9   Vitamin D deficiency, unspecified       J30.9   Allergic rhinitis, unspecified       R05   Cough       Z12.39   Encounter for other screening for malignant neoplasm of breast       R22.1   Localized swelling, mass and lump, neck           ORDERS:         Meds Prescribed:       [Refilled] lisinopriL 10 mg oral tablet [take 1 tablet (10 mg) by oral route once daily], #90 (ninety) tablets, Refills: 1 (one)       [New Rx] promethazine-DM 6.25-15 mg/5 mL oral Syrup [take 5 milliliters by oral route  QHS prn cough; caution (sedation)], #118 (one hundred and eighteen) milliliters, Refills: 0 (zero)       [New Rx] Tessalon Perles 100 mg oral capsule [take 1 capsule (100 mg) by oral route 3 times per day as needed for cough], #30 (thirty) capsules, Refills: 0 (zero)         Radiology/Test Orders:       3017F  Colorectal CA screen results documented and reviewed (PV)  (In-House)            23790  US, soft tissues of head &  neck (eg, thyroid, parathyroid, parotid), real time w/image documentation  (Send-Out)              Lab Orders:       [id number]  VITD - [facility] Vitamin D, 25 Hydroxy  (Send-Out)            54168  HTNLP - [facility] CMP AND LIPID: [id number], [id number]  (Send-Out)            APPTO  Appointment need  (In-House)              Other Orders:         Screening mammogram results documented  (Send-Out)            1124F  Advance Care Planning discussed and doc in MR; no surrogate named or advance care plan provided  (Send-Out)                      Plan:         HTN - Essential (primary) hypertensionBP at goal.  Continue lisinopril 10 mg daily.  No refills needed.  Checking labs.  RTC 6 months for AWV.    LABORATORY:  Labs ordered to be performed today include HTN/Lipid Panel: CMP, Lipid.  MIPS Vaccines Flu and Pneumonia updated in Shot record Screening mammomgram done within last 2 years and results in are chart Colorectal Cancer Screening is up to date and the results are in the chart Advance Directive/Surrogate Decision Maker discussed and pt declines to complete today     FOLLOW-UP: Schedule a follow-up visit in 6 months.:.  Medicare wellness 30 min with [place]          Prescriptions:       [Refilled] lisinopriL 10 mg oral tablet [take 1 tablet (10 mg) by oral route once daily], #90 (ninety) tablets, Refills: 1 (one)           Orders:       [id number]  HTNLP - [facility] CMP AND LIPID: [id number], [id number]  (Send-Out)              Screening mammogram results documented  (Send-Out)            3017F  Colorectal CA screen results documented and reviewed (PV)  (In-House)            1124F  Advance Care Planning discussed and doc in MR; no surrogate named or advance care plan provided  (Send-Out)            APPTO  Appointment need  (In-House)              Vitamin D deficiency, unspecifiedChecking labs.  No refills needed.    LABORATORY:  Labs ordered to be performed today include Vitamin D.            Orders:       [id number]  VITD - [facility] Vitamin D, 25 Hydroxy   (Send-Out)              Allergic rhinitis, unspecifiedShe is on weekly allergy shots.        Cough          Prescriptions:       [New Rx] promethazine-DM 6.25-15 mg/5 mL oral Syrup [take 5 milliliters by oral route  QHS prn cough; caution (sedation)], #118 (one hundred and eighteen) milliliters, Refills: 0 (zero)       [New Rx] Tessalon Perles 100 mg oral capsule [take 1 capsule (100 mg) by oral route 3 times per day as needed for cough], #30 (thirty) capsules, Refills: 0 (zero)         Encounter for other screening for malignant neoplasm of breastShe is getting this scheduled.        Localized swelling, mass and lump, neckFirm nontender nodule inferior to R maxilla.  Evaluating with U/S.        RADIOLOGY:  I have ordered Neck Ultrasound to be done today.            Orders:       [id number]  US, soft tissues of head & neck (eg, thyroid, parathyroid, parotid), real time w/image documentation  (Send-Out)                  Patient Recommendations:        For  HTN - Essential (primary) hypertension:    Schedule a follow-up visit in 6 months.                APPOINTMENT INFORMATION:        Monday Tuesday Wednesday Thursday Friday Saturday Sunday            Time:___________________AM  PM   Date:_____________________             Charge Capture:         Primary Diagnosis:     I10  HTN - Essential (primary) hypertension           Orders:      49921  Office/outpatient visit; established patient, level 4  (In-House)            3017F  Colorectal CA screen results documented and reviewed (PV)  (In-House)            APPTO  Appointment need  (In-House)              E55.9  Vitamin D deficiency, unspecified     J30.9  Allergic rhinitis, unspecified     R05  Cough     Z12.39  Encounter for other screening for malignant neoplasm of breast     R22.1  Localized swelling, mass and lump, neck

## 2021-05-18 NOTE — PROGRESS NOTES
Bethanie Forte  1955     Office/Outpatient Visit    Visit Date: Fri, Oct 9, 2020 01:32 pm    Provider: Alivia Montano MD (Assistant: Josefa Allen MA)    Location: [facility]        Electronically signed by Alivia Montano MD on  10/09/2020 05:16:47 PM                             Subjective:        CC: Ms. Forte is a 65 year old White female.  Patient presents today for [facility] visit;         HPI:       She is due for colonoscopy.  Pap smear is no longer indicated by age and history.  She is UTD on mammogram, last done 11/2019 and this showed an abnormality that was subsequently diagnosed as benign fibroadenoma on biopsy.  She is due for DEXA.  She is UTD on Zostavax (11/2015).  She is due for Prevnar, Pneumovax, Shingrix, Havrix, Td and flu shot.  She is UTD on routine labs.     Ms. Forte is here for a Medicare wellness visit.  The required HRA questions are integrated within this visit note. Family medical history and individual medical/surgical history were reviewed and updated.  A current height, weight, BMI, blood pressure, and pulse were recorded in the vitals section of the note and have been reviewed. Patient's medications, including supplements, were recorded in the chart and reviewed.  Current providers and suppliers were reviewed and updated.          Self-Assessment of Health: She rates her health as good. She rates her confidence of being able to control/manage most of her health problems as very confident. Her physical/emotional health has limited her social activites slightly.  A review of cognitive impairment was performed, including ability to drive a car, manage finances, and any memory changes, and was found to be negative.  A review of functional ability, including bathing, dressing, walking, and urine/bowel continence as well as level of safety was performed and was found to be negative.  Falls Risk: Has not had any falls or only one fall without injury in the past  year.  She denies having trouble hearing the TV/radio when others do not, having to strain to hear or understand conversations and wearing hearing aid(s).  Concerning home safety, she reports that at home she DOES have adequate lighting, a skid resistant shower/tub, handrails on stairs and functioning smoke alarms, but not grab bars in the bath or absence of throw rugs.          Immunization Status: ** >10 years since last Td booster; ** Has not received pneumococcal vaccination; ** Has not received influenza vaccine for this season; ** Has not received Prevnar 13 vaccination; Physical Activity: She exercises but less than 20 minutes 3 days per week; Type of diet patient normally eats is described as poor--needs improvement.  Tobacco: She has never smoked.  Preventative Health updated today     ROS:     CONSTITUTIONAL:  Negative for fatigue and fever.      EYES:  Negative for blurred vision.      E/N/T:  Negative for diminished hearing and nasal congestion.      CARDIOVASCULAR:  Negative for chest pain and palpitations.      RESPIRATORY:  Negative for recent cough and dyspnea.      GASTROINTESTINAL:  Negative for abdominal pain, constipation, diarrhea, nausea and vomiting.      GENITOURINARY:  Negative for dysuria and urinary incontinence.      MUSCULOSKELETAL:  Negative for arthralgias and myalgias.      NEUROLOGICAL:  Negative for paresthesias and weakness.      PSYCHIATRIC:  Negative for anxiety, depression and sleep disturbance.          Past Medical History / Family History / Social History:         Last Reviewed on 10/09/2020 02:01 PM by Alivia Montano    Past Medical History:             GYNECOLOGICAL HISTORY:             PREVENTIVE HEALTH MAINTENANCE             Hepatitis C Medicare Screening: was last done 3/2018     MAMMOGRAM: Done within last 2 years and results in are chart was last done 2019 with the following abnormalities noted-- R breast dxtic mammo -- BIRADS 4, suspicious calcfications --  bx recommended: benign fibroadenoma The next one is due  R breast dxtic mammo tickled for 6 months     PAP SMEAR: was last done 2018 - Dr. Erwin Baig with normal results         Surgical History:         Procedures:    Colonoscopy ( has had one less than 10 years  ago ) her GYN orders due          Family History:     Father:  at age 46; Cause of death was MI     Mother:  at age 90's;  Dementia;  RLS     Brother(s): Coronary Artery Disease     Sister(s): Hypertension     Son(s): 1 son(s) total;  Hypertension         Social History:     Occupation: Homemaker     Marital Status:      Children: 1 child         Tobacco/Alcohol/Supplements:     Last Reviewed on 10/09/2020 02:01 PM by Alivia Montano    Tobacco: She has never smoked.          Substance Abuse History:     Last Reviewed on 10/09/2020 02:01 PM by Alivia Montano    None         Mental Health History:     Last Reviewed on 10/09/2020 02:01 PM by Alivia Montano        Communicable Diseases (eg STDs):     Last Reviewed on 10/09/2020 02:01 PM by Alivia Montano        Current Problems:     Last Reviewed on 2020 09:43 AM by Alivia Montano    HTN - Essential (primary) hypertension    Other fatigue    Anxiety disorder, unspecified    Vitamin D deficiency, unspecified    Allergic rhinitis, unspecified    Encounter for screening for malignant neoplasm of colon    Other abnormal and inconclusive findings on diagnostic imaging of breast        Immunizations:     Zostavax (Zoster live) 11/3/2015        Allergies:     Last Reviewed on 10/09/2020 01:35 PM by Josefa Allen    No Known Allergies.        Current Medications:     Last Reviewed on 10/09/2020 01:35 PM by Josefa Allen    Prinivil 10 mg oral tablet [Take 1 tablet(s) by mouth daily]    Calcium/Vit D      Vitamin D3 50 mcg (2,000 unit) oral capsule    allergy shots (clusters) started 2018         Objective:        Vitals:         Current: 10/9/2020 1:37:55 PM    Ht:  5 ft, 6  in;  Wt: 239.8 lbs;  BMI: 38.7T: 98 F (temporal);  BP: 141/56 mm Hg (left arm, sitting);  P: 72 bpm (left arm (BP Cuff), sitting);  sCr: 0.82 mg/dL;  GFR: 82.79VA: 20/30 OD, 20/30 OS (near, with correction)        Repeat:     2:40:31 PM  BP:   125/64mm Hg (left arm, sitting, HR: 66)     Exams:     PHYSICAL EXAM:     GENERAL: vital signs recorded - well developed, well nourished;  well groomed;  no apparent distress;     EYES: extraocular movements intact; conjunctiva and cornea are normal; PERRLA;     E/N/T: OROPHARYNX:  normal mucosa, dentition, gingiva, and posterior pharynx;     RESPIRATORY: normal respiratory rate and pattern with no distress; normal breath sounds with no rales, rhonchi, wheezes or rubs;     CARDIOVASCULAR: normal rate; rhythm is regular;  no systolic murmur; no edema;     GASTROINTESTINAL: nontender; normal bowel sounds;     MUSCULOSKELETAL: normal gait; normal overall tone     NEUROLOGIC: mental status: alert and oriented x 3; Reflexes: brachioradialis: 2+; knee jerks: 2+;     PSYCHIATRIC: appropriate affect and demeanor; normal psychomotor function; normal speech pattern;         Assessment:         Z00.00   Encounter for general adult medical examination without abnormal findings       Z12.11   Encounter for screening for malignant neoplasm of colon       Z23   Encounter for immunization           ORDERS:         Lab Orders:       APPTO  Appointment need  (In-House)            90084  [facility] Occult blood by immunoassay  (Send-Out)              Procedures Ordered:         Annual wellness visit, includes a PPPS, subsequent visit  (In-House)            05194  Pneumococcal conjugate vaccine, 13 valent, for intramuscular use  (In-House)              Other Orders:       1101F  Pt screen for fall risk; document no falls in past year or only 1 fall w/o injury in past year (NAVARRO)  (In-House)              Screening mammogram results documented  (Send-Out)            1124F  Advance Care  Planning discussed and doc in MR; no surrogate named or advance care plan provided  (Send-Out)              Depression screen negative  (In-House)              Administration of pneumococcal vaccine  (x1)                  Plan:         Encounter for general adult medical examination without abnormal findingsShe is due for colonoscopy; does not want to go to the hospital right now d/t COVID so will do FIT testing for this year instead.  Pap smear is no longer indicated by age and history.  She is UTD on mammogram, last done 11/2019 and this showed an abnormality that was subsequently diagnosed as benign fibroadenoma on biopsy.  She is reportedly UTD DEXA, [facility]i [facility]; records requested.  She is UTD on Zostavax (11/2015).  She is due for Prevnar, Pneumovax, Shingrix, Havrix, Td and flu shot.  Prevnar given today.  We are out of high dose flu, so she will look around to see if she can find a dose at the pharmacy.  [place]  Pneumovax will be given in 1 year.  She is UTD on routine labs.  No fall risk, no memory issues, no signs/symptoms of depression.  She is able to drive and perform ADLs/manage finances independently.  Hearing is adequate.  She does not have a living will.  Preventive services handout and safety handout were given to her.  Current doctor list updated.  RTC 6 months.    MIPS PHQ-9 Depression Screening: Completed form scanned and in chart; Total Score 3; Negative Depression Screen ADVANCE DIRECTIVES (Please update in PMH): Living will     FOLLOW-UP: Schedule a follow-up visit in 6 months.:.  f/u HTN with [place]          Orders:         Annual wellness visit, includes a PPPS, subsequent visit  (In-House)            1101F  Pt screen for fall risk; document no falls in past year or only 1 fall w/o injury in past year (NAVARRO)  (In-House)              Screening mammogram results documented  (Send-Out)            1124F   Advance Care Planning discussed and doc in MR; no surrogate named or advance care plan provided  (Send-Out)            APPTO  Appointment need  (In-House)              Depression screen negative  (In-House)              Encounter for screening for malignant neoplasm of colon    LABORATORY:  Labs ordered to be performed today include Hemocult, Routine Screening.            Orders:       48880  [facility] Occult blood by immunoassay  (Send-Out)              Encounter for immunization        IMMUNIZATIONS given today: Prevnar 13.            Immunizations:       30179  Pneumococcal conjugate vaccine, 13 valent, for intramuscular use  (In-House)                Dose (ml): 0.5  Site: right deltoid  Route: intramuscular  Administered by: Josefa Allen          : [employer]  Lot #: [id number]  Exp: 05/01/2022          NDC: [address]-64        Administration of pneumococcal vaccine  (x1)              Patient Recommendations:        For  Encounter for general adult medical examination without abnormal findings:    Schedule a follow-up visit in 6 months.                APPOINTMENT INFORMATION:        Monday Tuesday Wednesday Thursday Friday Saturday Sunday            Time:___________________AM  PM   Date:_____________________             Charge Capture:         Primary Diagnosis:     Z00.00  Encounter for general adult medical examination without abnormal findings           Orders:        Annual wellness visit, includes a PPPS, subsequent visit  (In-House)            1101F  Pt screen for fall risk; document no falls in past year or only 1 fall w/o injury in past year (NAVARRO)  (In-House)            APPTO  Appointment need  (In-House)              Depression screen negative  (In-House)              Z12.11  Encounter for screening for malignant neoplasm of colon     Z23  Encounter for immunization           Orders:      16268  Pneumococcal conjugate vaccine, 13 valent, for intramuscular use   (In-House)              Administration of pneumococcal vaccine  (x1)

## 2021-05-18 NOTE — PROGRESS NOTES
"Bethanie Forte. 1955     Office/Outpatient Visit    Visit Date:  03:14 pm    Provider: Mila Mendoza MD (Assistant: Chanel Mahmood MA)    Location: [facility]        Electronically signed by Mila Mendoza MD on  2018 01:32:29 PM                             SUBJECTIVE:        CC: URI symptoms         HPI:         Patient to be evaluated for uRI.  These have been present for the past one week.  The symptoms include chest congestion, dry, mild hacking cough, \"sinus\" headache, nasal congestion, purulent nasal discharge, sinus pain/pressure and tooth pain.  She denies body aches, dizziness or fever.  She denies exposure to ill contacts.  She has already tried to relieve the symptoms with tried sudafed and this affects her BP, tried NS spray, didnt try antihistamines and took left over augmentin about 4 days without improvement.  she is clausterphobic and having  a lot of anxiety over not being able to breath     ROS:     CONSTITUTIONAL:  Positive for fatigue.   Negative for fever.      E/N/T:  Positive for nasal congestion and frequent rhinorrhea.   Negative for sore throat.      CARDIOVASCULAR:  Negative for chest pain, palpitations, tachycardia, orthopnea, and edema.      RESPIRATORY:  Positive for recent cough and frequent wheezing.      MUSCULOSKELETAL:  Negative for arthralgias, back pain, and myalgias.      NEUROLOGICAL:  Negative for dizziness, headaches, paresthesias, and weakness.          PMH/FMH/SH:     Last Reviewed on 2018 03:44 PM by Mila Mendoza    Past Medical History:             GYNECOLOGICAL HISTORY:             Surgical History:         Procedures:    Colonoscopy ( has had one less than 10 years  ago ) her GYN orders due 2017         Family History:     Father:  at age 46; Cause of death was MI     Mother:  at age 90's;  Dementia;  RLS     Brother(s): Coronary Artery Disease     Sister(s): Hypertension     Son(s): 1 son(s) total;  " Hypertension         Social History:     Occupation: Homemaker     Marital Status:      Children: 1 child         Tobacco/Alcohol/Supplements:     Last Reviewed on 1/02/2018 03:44 PM by Mila Mendoza    Tobacco: She has never smoked.          Substance Abuse History:     None             Allergies:     Last Reviewed on 6/26/2017 03:09 PM by Angelique Gaona      No Known Drug Allergies.         Current Medications:     Last Reviewed on 6/26/2017 03:09 PM by Angelique Gaona    Prinivil 10mg Tablet Take 1 tablet(s) by mouth daily     Calcium/Vit D     Vitamin D3 2,000IU Capsules         OBJECTIVE:        Vitals:         Current: 1/2/2018 3:19:26 PM    Ht:  5 ft, 6 in;  Wt: 239.2 lbs;  BMI: 38.6    T: 98.7 F (oral);  BP: 132/64 mm Hg (left arm, sitting);  P: 83 bpm (left arm (BP Cuff), sitting);  sCr: 0.81 mg/dL;  GFR: 87.05        Exams:     PHYSICAL EXAM:     GENERAL: no apparent distress;     E/N/T: EARS: bilateral TMs are normal;  NOSE: nasal mucosa is erythematous;  turbinates are moderately swollen bilaterally;  bilateral maxillary sinus tenderness present; OROPHARYNX: posterior pharynx, including tonsils, tongue, and uvula are normal;     NECK: supple, no LAD/TM;     RESPIRATORY: normal respiratory rate and pattern with no distress; normal breath sounds with no rales, rhonchi, wheezes or rubs;     CARDIOVASCULAR:     LYMPHATIC: no enlargement of cervical or facial nodes;     NEUROLOGIC: mental status: alert and oriented x 3; GROSSLY INTACT     PSYCHIATRIC:  appropriate affect and demeanor; normal speech pattern; grossly normal memory;         ASSESSMENT           461.0   J01.00  Acute maxillary sinusitis              DDx:     307.41   G47.00  Insomnia              DDx:         ORDERS:         Meds Prescribed:       Mucinex DM (Dextromethorphan/Guaifenesin) 30mg/600mg Tablets, Extended Release 2 tabs q 12 hrs prn  #40 (Forty) tablet(s) Refills: 0       Fluticasone Propionate 50mcg/1actuation Nasal  Spray 1 spray each nostil daily  #16 (Sixteen) gm Refills: 0       Bactrim DS (Trimethoprim/Sulfamethoxazole ) Tablet Take 1 tablet(s) by mouth q12h for 10 days  #20 (Twenty) tablet(s) Refills: 0                 PLAN:          Acute maxillary sinusitis try nasal saline spray she is very complicated due to her claustiphobia-will have her push fluids, use a humidifier.         RECOMMENDATIONS given include: Push Fluids, Rest, Follow up if no improvement or worsening symptoms like high fevers, vomiting, weakness, or increasing shortness of air.    .            Prescriptions:       Mucinex DM (Dextromethorphan/Guaifenesin) 30mg/600mg Tablets, Extended Release 2 tabs q 12 hrs prn  #40 (Forty) tablet(s) Refills: 0       Fluticasone Propionate 50mcg/1actuation Nasal Spray 1 spray each nostil daily  #16 (Sixteen) gm Refills: 0       Bactrim DS (Trimethoprim/Sulfamethoxazole ) Tablet Take 1 tablet(s) by mouth q12h for 10 days  #20 (Twenty) tablet(s) Refills: 0          Insomnia not sleeping due to SOA and unable to breath laying down             CHARGE CAPTURE           **Please note: ICD descriptions below are intended for billing purposes only and may not represent clinical diagnoses**        Primary Diagnosis:         461.0 Acute maxillary sinusitis            J01.00    Acute maxillary sinusitis, unspecified              Orders:          04404   Office/outpatient visit; established patient, level 4  (In-House)           307.41 Insomnia            G47.00    Insomnia, unspecified

## 2021-07-01 VITALS
WEIGHT: 225.8 LBS | HEART RATE: 72 BPM | DIASTOLIC BLOOD PRESSURE: 48 MMHG | SYSTOLIC BLOOD PRESSURE: 113 MMHG | BODY MASS INDEX: 36.29 KG/M2 | TEMPERATURE: 98.1 F | HEIGHT: 66 IN

## 2021-07-01 VITALS
WEIGHT: 239.2 LBS | DIASTOLIC BLOOD PRESSURE: 64 MMHG | SYSTOLIC BLOOD PRESSURE: 132 MMHG | BODY MASS INDEX: 38.44 KG/M2 | HEIGHT: 66 IN | HEART RATE: 83 BPM | TEMPERATURE: 98.7 F

## 2021-07-01 VITALS
HEART RATE: 85 BPM | DIASTOLIC BLOOD PRESSURE: 64 MMHG | HEIGHT: 66 IN | WEIGHT: 230 LBS | BODY MASS INDEX: 36.96 KG/M2 | TEMPERATURE: 98.5 F | SYSTOLIC BLOOD PRESSURE: 122 MMHG

## 2021-07-01 VITALS
HEART RATE: 100 BPM | BODY MASS INDEX: 37.99 KG/M2 | TEMPERATURE: 98.4 F | DIASTOLIC BLOOD PRESSURE: 63 MMHG | WEIGHT: 236.4 LBS | HEIGHT: 66 IN | SYSTOLIC BLOOD PRESSURE: 139 MMHG

## 2021-07-01 VITALS
HEART RATE: 79 BPM | TEMPERATURE: 98.5 F | HEIGHT: 66 IN | WEIGHT: 240.4 LBS | DIASTOLIC BLOOD PRESSURE: 64 MMHG | SYSTOLIC BLOOD PRESSURE: 140 MMHG | BODY MASS INDEX: 38.63 KG/M2

## 2021-07-02 VITALS
SYSTOLIC BLOOD PRESSURE: 125 MMHG | DIASTOLIC BLOOD PRESSURE: 64 MMHG | HEART RATE: 72 BPM | BODY MASS INDEX: 38.54 KG/M2 | HEIGHT: 66 IN | TEMPERATURE: 98 F | WEIGHT: 239.8 LBS

## 2021-07-02 VITALS
TEMPERATURE: 96.4 F | DIASTOLIC BLOOD PRESSURE: 76 MMHG | WEIGHT: 240.6 LBS | BODY MASS INDEX: 38.67 KG/M2 | HEART RATE: 82 BPM | SYSTOLIC BLOOD PRESSURE: 128 MMHG | HEIGHT: 66 IN

## 2021-09-29 ENCOUNTER — TELEPHONE (OUTPATIENT)
Dept: FAMILY MEDICINE CLINIC | Age: 66
End: 2021-09-29

## 2021-09-29 DIAGNOSIS — E55.9 VITAMIN D DEFICIENCY: ICD-10-CM

## 2021-09-29 DIAGNOSIS — I10 ESSENTIAL HYPERTENSION: Primary | ICD-10-CM

## 2021-09-29 PROBLEM — R53.83 OTHER FATIGUE: Status: ACTIVE | Noted: 2021-09-29

## 2021-09-29 PROBLEM — F41.9 ANXIETY: Status: ACTIVE | Noted: 2021-09-29

## 2021-09-29 PROBLEM — J30.9 ALLERGIC RHINITIS: Status: ACTIVE | Noted: 2021-09-29

## 2021-10-15 ENCOUNTER — LAB (OUTPATIENT)
Dept: LAB | Facility: HOSPITAL | Age: 66
End: 2021-10-15

## 2021-10-15 DIAGNOSIS — E55.9 VITAMIN D DEFICIENCY: ICD-10-CM

## 2021-10-15 DIAGNOSIS — I10 ESSENTIAL HYPERTENSION: ICD-10-CM

## 2021-10-15 LAB
25(OH)D3 SERPL-MCNC: 49.1 NG/ML (ref 30–100)
ALBUMIN SERPL-MCNC: 4.6 G/DL (ref 3.5–5.2)
ALBUMIN/GLOB SERPL: 2.3 G/DL
ALP SERPL-CCNC: 71 U/L (ref 39–117)
ALT SERPL W P-5'-P-CCNC: 18 U/L (ref 1–33)
ANION GAP SERPL CALCULATED.3IONS-SCNC: 8.7 MMOL/L (ref 5–15)
AST SERPL-CCNC: 19 U/L (ref 1–32)
BILIRUB SERPL-MCNC: 0.4 MG/DL (ref 0–1.2)
BUN SERPL-MCNC: 15 MG/DL (ref 8–23)
BUN/CREAT SERPL: 22.7 (ref 7–25)
CALCIUM SPEC-SCNC: 9.8 MG/DL (ref 8.6–10.5)
CHLORIDE SERPL-SCNC: 106 MMOL/L (ref 98–107)
CHOLEST SERPL-MCNC: 171 MG/DL (ref 0–200)
CO2 SERPL-SCNC: 27.3 MMOL/L (ref 22–29)
CREAT SERPL-MCNC: 0.66 MG/DL (ref 0.57–1)
GFR SERPL CREATININE-BSD FRML MDRD: 89 ML/MIN/1.73
GLOBULIN UR ELPH-MCNC: 2 GM/DL
GLUCOSE SERPL-MCNC: 94 MG/DL (ref 65–99)
HDLC SERPL-MCNC: 59 MG/DL (ref 40–60)
LDLC SERPL CALC-MCNC: 94 MG/DL (ref 0–100)
LDLC/HDLC SERPL: 1.56 {RATIO}
POTASSIUM SERPL-SCNC: 3.9 MMOL/L (ref 3.5–5.2)
PROT SERPL-MCNC: 6.6 G/DL (ref 6–8.5)
SODIUM SERPL-SCNC: 142 MMOL/L (ref 136–145)
TRIGL SERPL-MCNC: 100 MG/DL (ref 0–150)
VLDLC SERPL-MCNC: 18 MG/DL (ref 5–40)

## 2021-10-15 PROCEDURE — 80061 LIPID PANEL: CPT

## 2021-10-15 PROCEDURE — 80053 COMPREHEN METABOLIC PANEL: CPT

## 2021-10-15 PROCEDURE — 82306 VITAMIN D 25 HYDROXY: CPT

## 2021-10-15 PROCEDURE — 36415 COLL VENOUS BLD VENIPUNCTURE: CPT

## 2021-10-20 ENCOUNTER — TELEPHONE (OUTPATIENT)
Dept: FAMILY MEDICINE CLINIC | Age: 66
End: 2021-10-20

## 2021-10-20 ENCOUNTER — OFFICE VISIT (OUTPATIENT)
Dept: FAMILY MEDICINE CLINIC | Age: 66
End: 2021-10-20

## 2021-10-20 VITALS
HEART RATE: 71 BPM | WEIGHT: 239.2 LBS | BODY MASS INDEX: 38.44 KG/M2 | DIASTOLIC BLOOD PRESSURE: 48 MMHG | SYSTOLIC BLOOD PRESSURE: 134 MMHG | TEMPERATURE: 98.9 F | HEIGHT: 66 IN

## 2021-10-20 DIAGNOSIS — Z12.11 COLON CANCER SCREENING: ICD-10-CM

## 2021-10-20 DIAGNOSIS — Z12.31 SCREENING MAMMOGRAM, ENCOUNTER FOR: ICD-10-CM

## 2021-10-20 DIAGNOSIS — Z00.00 ANNUAL PHYSICAL EXAM: Primary | ICD-10-CM

## 2021-10-20 DIAGNOSIS — Z23 ENCOUNTER FOR IMMUNIZATION: ICD-10-CM

## 2021-10-20 PROCEDURE — 1170F FXNL STATUS ASSESSED: CPT | Performed by: FAMILY MEDICINE

## 2021-10-20 PROCEDURE — G0439 PPPS, SUBSEQ VISIT: HCPCS | Performed by: FAMILY MEDICINE

## 2021-10-20 PROCEDURE — G0009 ADMIN PNEUMOCOCCAL VACCINE: HCPCS | Performed by: FAMILY MEDICINE

## 2021-10-20 PROCEDURE — 1159F MED LIST DOCD IN RCRD: CPT | Performed by: FAMILY MEDICINE

## 2021-10-20 PROCEDURE — 90732 PPSV23 VACC 2 YRS+ SUBQ/IM: CPT | Performed by: FAMILY MEDICINE

## 2021-10-20 NOTE — TELEPHONE ENCOUNTER
Hub to read    Pt called states she was having a reaction to her vacc, she states were she had the shot it is red and a little swollen. She is just making sure that is normal or if she was having a allergic reaction.

## 2021-10-20 NOTE — TELEPHONE ENCOUNTER
Spoke to pt and instructed her that it is a normal local reaction.  She should watch for increase in size and redness over the next few days instead of decreasing.

## 2021-10-20 NOTE — ASSESSMENT & PLAN NOTE
She is due for colonoscopy; she does do FIT testing yearly, last done 10/2020.  Pap smear is no longer indicated by age and history.  She is due for mammogram, last done 7/2020 and this was normal.  She is reportedly UTD on DEXA, done at [facility].  She is UTD on COVID (Pfizer -- needs shot #3), Prevnar (10/2020) and Zostavax (11/2015).  She is due for Pneumovax, Shingrix, Td and flu shot.  She is UTD on routine labs.

## 2021-10-20 NOTE — PROGRESS NOTES
The ABCs of the Annual Wellness Visit  Subsequent Medicare Wellness Visit    Chief Complaint   Patient presents with   • Medicare Wellness-subsequent      Subjective    History of Present Illness:  Bethanie Forte is a 66 y.o. female who presents for a Subsequent Medicare Wellness Visit.    She is due for colonoscopy; she does do FIT testing yearly, last done 10/2020.  Pap smear is no longer indicated by age and history.  She is due for mammogram, last done 7/2020 and this was normal.  She is reportedly UTD on DEXA, done at [facility] in [facility].  She is UTD on COVID (Pfizer -- needs shot #3), Prevnar (10/2020) and Zostavax (11/2015).  She is due for Pneumovax, Shingrix, Td and flu shot.  She is UTD on routine labs.     The following portions of the patient's history were reviewed and   updated as appropriate: allergies, current medications, past family history, past medical history, past social history, past surgical history and problem list.    Compared to one year ago, the patient feels her physical   health is the same.    Compared to one year ago, the patient feels her mental   health is the same.    Recent Hospitalizations:  She was not admitted to the hospital during the last year.       Current Medical Providers:  Patient Care Team:  lAivia Montano MD as PCP - General    Outpatient Medications Prior to Visit   Medication Sig Dispense Refill   • Calcium 150 MG tablet Take  by mouth.     • Cholecalciferol (VITAMIN D3) 1000 units capsule Take  by mouth.     • EPINEPHrine (EPIPEN) 0.3 MG/0.3ML solution auto-injector injection      • lisinopril (PRINIVIL,ZESTRIL) 10 MG tablet Take 10 mg by mouth Daily.     • montelukast (SINGULAIR) 10 MG tablet Take 10 mg by mouth As Needed.       No facility-administered medications prior to visit.       No opioid medication identified on active medication list. I have reviewed chart for other potential  high risk medication/s and harmful drug interactions  "in the elderly.          Aspirin is not on active medication list.  Aspirin use is not indicated based on review of current medical condition/s. Risk of harm outweighs potential benefits.  .    Patient Active Problem List   Diagnosis   • Essential hypertension   • OAB (overactive bladder)   • Vitamin D deficiency   • Anxiety   • Other fatigue   • Allergic rhinitis   • Annual physical exam     Advance Care Planning  Advance Directive is not on file.  ACP discussion was held with the patient during this visit. Patient has an advance directive (not in EMR), copy requested.    Review of Systems   Constitutional: Negative for chills, fatigue and fever.   HENT: Negative for congestion, hearing loss and rhinorrhea.    Eyes: Negative for pain and visual disturbance.   Respiratory: Negative for cough and shortness of breath.    Cardiovascular: Negative for chest pain and palpitations.   Gastrointestinal: Negative for abdominal pain, constipation, diarrhea, nausea and vomiting.   Genitourinary: Negative for difficulty urinating and dysuria.   Musculoskeletal: Negative for arthralgias and myalgias.   Neurological: Positive for numbness (L lateral thigh). Negative for weakness.   Psychiatric/Behavioral: Negative for dysphoric mood and sleep disturbance. The patient is not nervous/anxious.         Objective    Vitals:    10/20/21 0947 10/20/21 1018   BP: 145/58 134/48   BP Location: Left arm    Patient Position: Sitting    Cuff Size: Large Adult    Pulse: 81 71   Temp: 98.9 °F (37.2 °C)    TempSrc: Oral    Weight: 109 kg (239 lb 3.2 oz)    Height: 167.6 cm (65.98\")      Body mass index is 38.63 kg/m².  BMI has not been calculated during today's encounter.     Does the patient have evidence of cognitive impairment? No    Physical Exam  Lab Results   Component Value Date    TRIG 100 10/15/2021    HDL 59 10/15/2021    LDL 94 10/15/2021    VLDL 18 10/15/2021            HEALTH RISK ASSESSMENT    Smoking Status:  Social History "     Tobacco Use   Smoking Status Never Smoker   Smokeless Tobacco Never Used     Alcohol Consumption:  Social History     Substance and Sexual Activity   Alcohol Use None     Fall Risk Screen:    STEADI Fall Risk Assessment was completed, and patient is at LOW risk for falls.Assessment completed on:10/20/2021    Depression Screening:  PHQ-2/PHQ-9 Depression Screening 10/20/2021   Little interest or pleasure in doing things 0   Feeling down, depressed, or hopeless 0   Total Score 0       Health Habits and Functional and Cognitive Screening:  Functional & Cognitive Status 10/20/2021   Do you have difficulty preparing food and eating? No   Do you have difficulty bathing yourself, getting dressed or grooming yourself? No   Do you have difficulty using the toilet? No   Do you have difficulty moving around from place to place? No   Do you have trouble with steps or getting out of a bed or a chair? No   Current Diet Unhealthy Diet   Dental Exam Up to date   Eye Exam Up to date   Exercise (times per week) 1 times per week   Current Exercises Include Walking   Do you need help using the phone?  No   Are you deaf or do you have serious difficulty hearing?  No   Do you need help with transportation? No   Do you need help shopping? No   Do you need help preparing meals?  No   Do you need help with housework?  No   Do you need help with laundry? No   Do you need help taking your medications? No   Do you need help managing money? No   Do you ever drive or ride in a car without wearing a seat belt? No   Have you felt unusual stress, anger or loneliness in the last month? No   Who do you live with? Alone   If you need help, do you have trouble finding someone available to you? No   Have you been bothered in the last four weeks by sexual problems? No   Do you have difficulty concentrating, remembering or making decisions? No       Age-appropriate Screening Schedule:  Refer to the list below for future screening recommendations based  on patient's age, sex and/or medical conditions. Orders for these recommended tests are listed in the plan section. The patient has been provided with a written plan.    Health Maintenance   Topic Date Due   • TDAP/TD VACCINES (1 - Tdap) Never done   • ZOSTER VACCINE (1 of 2) Never done   • DXA SCAN  07/24/2019   • INFLUENZA VACCINE  Never done   • MAMMOGRAM  07/14/2022              Assessment/Plan   CMS Preventative Services Quick Reference  Risk Factors Identified During Encounter  Immunizations Discussed/Encouraged (specific Immunizations; Influenza, Pneumococcal 23 and COVID19  The above risks/problems have been discussed with the patient.  Follow up actions/plans if indicated are seen below in the Assessment/Plan Section.  Pertinent information has been shared with the patient in the After Visit Summary.    Diagnoses and all orders for this visit:    1. Annual physical exam (Primary)  Assessment & Plan:  She is due for colonoscopy; she does do FIT testing yearly, last done 10/2020.  Pap smear is no longer indicated by age and history.  She is due for mammogram, last done 7/2020 and this was normal.  She is reportedly UTD on DEXA, done at [facility] in [facility].  She is UTD on COVID (Pfizer -- needs shot #3), Prevnar (10/2020) and Zostavax (11/2015).  She is due for Pneumovax, Shingrix, Td and flu shot.  She is UTD on routine labs.       2. Screening mammogram, encounter for  -     Mammo Screening Digital Tomosynthesis Bilateral With CAD; Future    3. Encounter for immunization  -     Pneumococcal Polysaccharide Vaccine 23-Valent Greater Than or Equal To 3yo Subcutaneous / IM    4. Colon cancer screening  -     Occult Blood X 1, Stool - Stool, Per Rectum; Future      Follow Up:   Return in about 6 months (around 4/20/2022) for Recheck.     An After Visit Summary and PPPS were made available to the patient.

## 2021-12-01 ENCOUNTER — LAB (OUTPATIENT)
Dept: LAB | Facility: HOSPITAL | Age: 66
End: 2021-12-01

## 2021-12-01 ENCOUNTER — APPOINTMENT (OUTPATIENT)
Dept: MAMMOGRAPHY | Facility: HOSPITAL | Age: 66
End: 2021-12-01

## 2021-12-01 DIAGNOSIS — Z12.11 COLON CANCER SCREENING: Primary | ICD-10-CM

## 2021-12-01 LAB — HEMOCCULT STL QL IA: NEGATIVE

## 2021-12-01 PROCEDURE — 82274 ASSAY TEST FOR BLOOD FECAL: CPT

## 2021-12-02 ENCOUNTER — HOSPITAL ENCOUNTER (OUTPATIENT)
Dept: MAMMOGRAPHY | Facility: HOSPITAL | Age: 66
Discharge: HOME | End: 2021-12-02
Admitting: FAMILY MEDICINE

## 2021-12-02 DIAGNOSIS — Z12.31 SCREENING MAMMOGRAM, ENCOUNTER FOR: ICD-10-CM

## 2021-12-02 PROCEDURE — 77063 BREAST TOMOSYNTHESIS BI: CPT

## 2021-12-02 PROCEDURE — 77067 SCR MAMMO BI INCL CAD: CPT

## 2022-02-08 ENCOUNTER — LAB (OUTPATIENT)
Dept: LAB | Facility: HOSPITAL | Age: 67
End: 2022-02-08

## 2022-02-08 ENCOUNTER — OFFICE VISIT (OUTPATIENT)
Dept: FAMILY MEDICINE CLINIC | Age: 67
End: 2022-02-08

## 2022-02-08 VITALS
TEMPERATURE: 97.7 F | DIASTOLIC BLOOD PRESSURE: 55 MMHG | HEART RATE: 72 BPM | BODY MASS INDEX: 38.57 KG/M2 | HEIGHT: 66 IN | WEIGHT: 240 LBS | SYSTOLIC BLOOD PRESSURE: 134 MMHG

## 2022-02-08 DIAGNOSIS — F41.9 ANXIETY: Primary | ICD-10-CM

## 2022-02-08 DIAGNOSIS — I10 ESSENTIAL HYPERTENSION: ICD-10-CM

## 2022-02-08 DIAGNOSIS — K21.9 GASTROESOPHAGEAL REFLUX DISEASE WITHOUT ESOPHAGITIS: ICD-10-CM

## 2022-02-08 DIAGNOSIS — F41.9 ANXIETY: ICD-10-CM

## 2022-02-08 PROBLEM — Z00.00 ANNUAL PHYSICAL EXAM: Status: RESOLVED | Noted: 2021-10-20 | Resolved: 2022-02-08

## 2022-02-08 LAB
ALBUMIN SERPL-MCNC: 4.5 G/DL (ref 3.5–5.2)
ALBUMIN/GLOB SERPL: 1.8 G/DL
ALP SERPL-CCNC: 80 U/L (ref 39–117)
ALT SERPL W P-5'-P-CCNC: 17 U/L (ref 1–33)
ANION GAP SERPL CALCULATED.3IONS-SCNC: 6.7 MMOL/L (ref 5–15)
AST SERPL-CCNC: 14 U/L (ref 1–32)
BACTERIA UR QL AUTO: ABNORMAL /HPF
BASOPHILS # BLD AUTO: 0.02 10*3/MM3 (ref 0–0.2)
BASOPHILS NFR BLD AUTO: 0.3 % (ref 0–1.5)
BILIRUB SERPL-MCNC: 0.2 MG/DL (ref 0–1.2)
BILIRUB UR QL STRIP: NEGATIVE
BUN SERPL-MCNC: 14 MG/DL (ref 8–23)
BUN/CREAT SERPL: 20.3 (ref 7–25)
CALCIUM SPEC-SCNC: 9.9 MG/DL (ref 8.6–10.5)
CHLORIDE SERPL-SCNC: 100 MMOL/L (ref 98–107)
CLARITY UR: ABNORMAL
CO2 SERPL-SCNC: 29.3 MMOL/L (ref 22–29)
COLOR UR: YELLOW
CREAT SERPL-MCNC: 0.69 MG/DL (ref 0.57–1)
DEPRECATED RDW RBC AUTO: 42.9 FL (ref 37–54)
EOSINOPHIL # BLD AUTO: 0.08 10*3/MM3 (ref 0–0.4)
EOSINOPHIL NFR BLD AUTO: 1.2 % (ref 0.3–6.2)
ERYTHROCYTE [DISTWIDTH] IN BLOOD BY AUTOMATED COUNT: 13 % (ref 12.3–15.4)
GFR SERPL CREATININE-BSD FRML MDRD: 85 ML/MIN/1.73
GLOBULIN UR ELPH-MCNC: 2.5 GM/DL
GLUCOSE SERPL-MCNC: 98 MG/DL (ref 65–99)
GLUCOSE UR STRIP-MCNC: NEGATIVE MG/DL
HCT VFR BLD AUTO: 38.6 % (ref 34–46.6)
HGB BLD-MCNC: 12.9 G/DL (ref 12–15.9)
HGB UR QL STRIP.AUTO: NEGATIVE
IMM GRANULOCYTES # BLD AUTO: 0.01 10*3/MM3 (ref 0–0.05)
IMM GRANULOCYTES NFR BLD AUTO: 0.2 % (ref 0–0.5)
KETONES UR QL STRIP: NEGATIVE
LEUKOCYTE ESTERASE UR QL STRIP.AUTO: ABNORMAL
LYMPHOCYTES # BLD AUTO: 1.17 10*3/MM3 (ref 0.7–3.1)
LYMPHOCYTES NFR BLD AUTO: 17.6 % (ref 19.6–45.3)
MAGNESIUM SERPL-MCNC: 2 MG/DL (ref 1.6–2.4)
MCH RBC QN AUTO: 29.8 PG (ref 26.6–33)
MCHC RBC AUTO-ENTMCNC: 33.4 G/DL (ref 31.5–35.7)
MCV RBC AUTO: 89.1 FL (ref 79–97)
MONOCYTES # BLD AUTO: 0.57 10*3/MM3 (ref 0.1–0.9)
MONOCYTES NFR BLD AUTO: 8.6 % (ref 5–12)
NEUTROPHILS NFR BLD AUTO: 4.78 10*3/MM3 (ref 1.7–7)
NEUTROPHILS NFR BLD AUTO: 72.1 % (ref 42.7–76)
NITRITE UR QL STRIP: NEGATIVE
PH UR STRIP.AUTO: 6 [PH] (ref 5–8)
PLATELET # BLD AUTO: 261 10*3/MM3 (ref 140–450)
PMV BLD AUTO: 9.7 FL (ref 6–12)
POTASSIUM SERPL-SCNC: 4.2 MMOL/L (ref 3.5–5.2)
PROT SERPL-MCNC: 7 G/DL (ref 6–8.5)
PROT UR QL STRIP: NEGATIVE
RBC # BLD AUTO: 4.33 10*6/MM3 (ref 3.77–5.28)
RBC # UR STRIP: ABNORMAL /HPF
REF LAB TEST METHOD: ABNORMAL
SODIUM SERPL-SCNC: 136 MMOL/L (ref 136–145)
SP GR UR STRIP: 1.02 (ref 1–1.03)
SQUAMOUS #/AREA URNS HPF: ABNORMAL /HPF
TSH SERPL DL<=0.05 MIU/L-ACNC: 0.88 UIU/ML (ref 0.27–4.2)
UREA BREATH TEST QL: POSITIVE
UROBILINOGEN UR QL STRIP: ABNORMAL
WBC # UR STRIP: ABNORMAL /HPF
WBC NRBC COR # BLD: 6.63 10*3/MM3 (ref 3.4–10.8)

## 2022-02-08 PROCEDURE — 83735 ASSAY OF MAGNESIUM: CPT

## 2022-02-08 PROCEDURE — 84443 ASSAY THYROID STIM HORMONE: CPT

## 2022-02-08 PROCEDURE — 99214 OFFICE O/P EST MOD 30 MIN: CPT | Performed by: FAMILY MEDICINE

## 2022-02-08 PROCEDURE — 85025 COMPLETE CBC W/AUTO DIFF WBC: CPT

## 2022-02-08 PROCEDURE — 81001 URINALYSIS AUTO W/SCOPE: CPT

## 2022-02-08 PROCEDURE — 36415 COLL VENOUS BLD VENIPUNCTURE: CPT

## 2022-02-08 PROCEDURE — 83013 H PYLORI (C-13) BREATH: CPT

## 2022-02-08 PROCEDURE — 80053 COMPREHEN METABOLIC PANEL: CPT

## 2022-02-08 RX ORDER — HYDROXYZINE HYDROCHLORIDE 10 MG/1
10 TABLET, FILM COATED ORAL 2 TIMES DAILY PRN
Qty: 60 TABLET | Refills: 2 | Status: SHIPPED | OUTPATIENT
Start: 2022-02-08

## 2022-02-08 RX ORDER — FLUTICASONE PROPIONATE 50 MCG
2 SPRAY, SUSPENSION (ML) NASAL AS NEEDED
COMMUNITY
Start: 2022-01-18

## 2022-02-08 RX ORDER — HYDROXYZINE HYDROCHLORIDE 10 MG/1
10 TABLET, FILM COATED ORAL 2 TIMES DAILY PRN
COMMUNITY
End: 2022-02-08 | Stop reason: SDUPTHER

## 2022-02-08 NOTE — ASSESSMENT & PLAN NOTE
Checking H. pylori today as part of her work-up.  She may continue using famotidine over-the-counter for now.  I may consider switching her to a PPI depending on what the labs show and how her symptoms continue to evolve.

## 2022-02-08 NOTE — PROGRESS NOTES
"Chief Complaint  Anxiety (stomach issues, heart burn X4 days)    Subjective          Bethanie Forte presents to [facility] today for anxiety and heartburn for the past 4-5 days.  Initilaly sx started abruptly on last Friday.  She felt clammy and her muscles felt weak.  She was worried she might be having a stroke.  She started looking online and thought she might be having a heart attack.  That really got her even more anxious.  However, she has not had any CP, palpitations, or SOB.  No lightheadness.  Her muscles feel \"locked up, like it's hard to move.\"  +Diarrhea.      She has been taking a half-tab of hydroxyzine to see if that would help.  She does think that has helped somewhat.    She also thinks that she has been having some acid reflux.  She has had intermittent problems with this for year.  She usually takes famotidine over the counter.        Current Outpatient Medications:   •  Calcium 150 MG tablet, Take  by mouth., Disp: , Rfl:   •  Cholecalciferol (VITAMIN D3) 1000 units capsule, Take  by mouth., Disp: , Rfl:   •  EPINEPHrine (EPIPEN) 0.3 MG/0.3ML solution auto-injector injection, As Needed., Disp: , Rfl:   •  fluticasone (FLONASE) 50 MCG/ACT nasal spray, As Needed., Disp: , Rfl:   •  hydrOXYzine (ATARAX) 10 MG tablet, Take 1 tablet by mouth 2 (Two) Times a Day As Needed for Anxiety., Disp: 60 tablet, Rfl: 2  •  lisinopril (PRINIVIL,ZESTRIL) 10 MG tablet, Take 10 mg by mouth Daily., Disp: , Rfl:   •  montelukast (SINGULAIR) 10 MG tablet, Take 10 mg by mouth As Needed., Disp: , Rfl:     Allergies:  Patient has no known allergies.      Objective   Vital Signs:   Vitals:    02/08/22 1413 02/08/22 1524   BP: 143/66 134/55   BP Location: Left arm Left arm   Patient Position: Sitting Sitting   Pulse: 80 72   Temp: 97.7 °F (36.5 °C)    TempSrc: Oral    Weight: 109 kg (240 lb)    Height: 167.6 cm (65.98\")        Physical Exam  Vitals reviewed.   Constitutional:       " General: She is not in acute distress.     Appearance: Normal appearance. She is well-developed.   HENT:      Head: Normocephalic and atraumatic.      Right Ear: External ear normal.      Left Ear: External ear normal.      Nose: Nose normal.      Mouth/Throat:      Mouth: Mucous membranes are moist.   Eyes:      Extraocular Movements: Extraocular movements intact.      Conjunctiva/sclera: Conjunctivae normal.      Pupils: Pupils are equal, round, and reactive to light.   Cardiovascular:      Rate and Rhythm: Normal rate and regular rhythm.      Heart sounds: No murmur heard.      Pulmonary:      Effort: Pulmonary effort is normal.      Breath sounds: Normal breath sounds. No wheezing, rhonchi or rales.   Abdominal:      General: Bowel sounds are normal. There is no distension.      Palpations: Abdomen is soft.      Tenderness: There is no abdominal tenderness.   Musculoskeletal:         General: Normal range of motion.   Neurological:      Mental Status: She is alert.   Psychiatric:         Attention and Perception: Attention and perception normal.         Mood and Affect: Affect normal. Mood is anxious.         Speech: Speech normal.         Behavior: Behavior normal.         Thought Content: Thought content normal.         Cognition and Memory: Cognition and memory normal.         Judgment: Judgment normal.             Assessment and Plan    Diagnoses and all orders for this visit:    1. Anxiety (Primary)  Assessment & Plan:  Bethanie does sound quite anxious today, although it is little difficult to tell if this is the cause or the effect of her symptoms.  I think it would be prudent to check for underlying organic causes with labs as below.  In the meantime, we will also focus on try to get the anxiety under better control.  I am going to have her start taking the hydroxyzine 10 mg twice daily scheduled and she may use a third dose throughout the day if she needs it for breakthrough anxiety.  If this is not  effective for her, we we will either consider increasing or possibly switching her over to something like buspirone.  The symptoms she is describing do not sound likely at all to be cardiac in origin.    Orders:  -     Comprehensive Metabolic Panel; Future  -     TSH; Future  -     Magnesium; Future  -     hydrOXYzine (ATARAX) 10 MG tablet; Take 1 tablet by mouth 2 (Two) Times a Day As Needed for Anxiety.  Dispense: 60 tablet; Refill: 2    2. Gastroesophageal reflux disease without esophagitis  Assessment & Plan:  Checking H. pylori today as part of her work-up.  She may continue using famotidine over-the-counter for now.  I may consider switching her to a PPI depending on what the labs show and how her symptoms continue to evolve.    Orders:  -     H. Pylori Breath Test - Breath, Lung; Future    3. Essential hypertension  Assessment & Plan:  Blood pressure is really doing fairly well things considered.  Continue lisinopril 10 mg daily.  No refills needed.  Continue to monitor.    Orders:  -     CBC & Differential; Future  -     Urinalysis With Microscopic - Urine, Clean Catch; Future      Follow Up   Return for as scheduled 4/20/22.  Patient was given instructions and counseling regarding her condition or for health maintenance advice. Please see specific information pulled into the AVS if appropriate.

## 2022-02-08 NOTE — ASSESSMENT & PLAN NOTE
Bethanie does sound quite anxious today, although it is little difficult to tell if this is the cause or the effect of her symptoms.  I think it would be prudent to check for underlying organic causes with labs as below.  In the meantime, we will also focus on try to get the anxiety under better control.  I am going to have her start taking the hydroxyzine 10 mg twice daily scheduled and she may use a third dose throughout the day if she needs it for breakthrough anxiety.  If this is not effective for her, we we will either consider increasing or possibly switching her over to something like buspirone.  The symptoms she is describing do not sound likely at all to be cardiac in origin.

## 2022-02-08 NOTE — ASSESSMENT & PLAN NOTE
Blood pressure is really doing fairly well things considered.  Continue lisinopril 10 mg daily.  No refills needed.  Continue to monitor.

## 2022-02-10 ENCOUNTER — TELEPHONE (OUTPATIENT)
Dept: FAMILY MEDICINE CLINIC | Age: 67
End: 2022-02-10

## 2022-02-10 DIAGNOSIS — A04.8 H. PYLORI INFECTION: Primary | ICD-10-CM

## 2022-02-10 RX ORDER — CLARITHROMYCIN 500 MG/1
500 TABLET, COATED ORAL 2 TIMES DAILY
Qty: 28 TABLET | Refills: 0 | Status: SHIPPED | OUTPATIENT
Start: 2022-02-10 | End: 2022-02-24

## 2022-02-10 RX ORDER — OMEPRAZOLE 20 MG/1
20 CAPSULE, DELAYED RELEASE ORAL 2 TIMES DAILY
Qty: 28 CAPSULE | Refills: 0 | Status: SHIPPED | OUTPATIENT
Start: 2022-02-10 | End: 2022-02-24

## 2022-02-10 RX ORDER — AMOXICILLIN 500 MG/1
1000 CAPSULE ORAL 2 TIMES DAILY
Qty: 56 CAPSULE | Refills: 0 | Status: SHIPPED | OUTPATIENT
Start: 2022-02-10 | End: 2022-02-24

## 2022-02-10 NOTE — TELEPHONE ENCOUNTER
Caller: Bethanie Forte A    Relationship: Self    Best call back number: [phone]    What is the best time to reach you: ANYTIME    Who are you requesting to speak with (clinical staff, provider,  specific staff member): KASSIE     What was the call regarding: PLEASE CALL  PATIENT BACK WHEN YOU HAVE A MINUTE     Do you require a callback: YES

## 2022-02-15 RX ORDER — LISINOPRIL 10 MG/1
10 TABLET ORAL DAILY
Qty: 90 TABLET | Refills: 1 | Status: SHIPPED | OUTPATIENT
Start: 2022-02-15 | End: 2022-08-26

## 2022-02-22 ENCOUNTER — TELEPHONE (OUTPATIENT)
Dept: FAMILY MEDICINE CLINIC | Age: 67
End: 2022-02-22

## 2022-02-22 NOTE — TELEPHONE ENCOUNTER
"    Caller: Bethanie Forte A    Relationship to patient: Self    Best call back number: [phone] OKAY TO LEAVE MESSAGE ON PHONE    Patient is needing: PATIENT CALLED IN AND WOULD LIKE TO KNOW IF SHE IS CONSIDERED \"CURED\" OF H PYLORI WHEN SHE IS FINISHED WITH HER MEDICATION. SHE WOULD ALSO LIKE TO KNOW IF SHE SHOULD CONTINUE TO TAKE PRILOSEC.SHE WOULD LIKE TO KNOW IF SHE IS GOING TO BE RETESTED FOR H PYLORI BACTERIA. PATIENT WOULD LIKE A CALL BACK FROM KASSIE IF POSSIBLE.        "

## 2022-02-22 NOTE — TELEPHONE ENCOUNTER
Ideally, yes, she will be cured from the H pylori whenever she finishes the medication regimen.  She should stop the omeprazole at that time so that we can get an accurate result on her recheck in 6 weeks.  Please let and if she has any other questions or concerns.  Thanks, ADOLFO

## 2022-04-11 ENCOUNTER — TELEPHONE (OUTPATIENT)
Dept: FAMILY MEDICINE CLINIC | Age: 67
End: 2022-04-11

## 2022-04-11 DIAGNOSIS — A04.8 H. PYLORI INFECTION: Primary | ICD-10-CM

## 2022-04-11 NOTE — TELEPHONE ENCOUNTER
----- Message from Alivia Montano MD sent at 2/10/2022 12:50 PM EST -----  Please call to have pt come in for labs (H pylori breath test, dx H pylori).  Please pend order to me.  Thanks, ADOLFO

## 2022-04-20 ENCOUNTER — LAB (OUTPATIENT)
Dept: LAB | Facility: HOSPITAL | Age: 67
End: 2022-04-20

## 2022-04-20 ENCOUNTER — OFFICE VISIT (OUTPATIENT)
Dept: FAMILY MEDICINE CLINIC | Age: 67
End: 2022-04-20

## 2022-04-20 VITALS
HEIGHT: 66 IN | TEMPERATURE: 98.3 F | WEIGHT: 232 LBS | DIASTOLIC BLOOD PRESSURE: 56 MMHG | HEART RATE: 70 BPM | SYSTOLIC BLOOD PRESSURE: 117 MMHG | BODY MASS INDEX: 37.28 KG/M2

## 2022-04-20 DIAGNOSIS — A04.8 H. PYLORI INFECTION: ICD-10-CM

## 2022-04-20 DIAGNOSIS — J01.01 ACUTE RECURRENT MAXILLARY SINUSITIS: ICD-10-CM

## 2022-04-20 DIAGNOSIS — I10 ESSENTIAL HYPERTENSION: ICD-10-CM

## 2022-04-20 DIAGNOSIS — A04.8 HELICOBACTER PYLORI INFECTION: Primary | ICD-10-CM

## 2022-04-20 DIAGNOSIS — F41.9 ANXIETY: Primary | ICD-10-CM

## 2022-04-20 DIAGNOSIS — J30.1 NON-SEASONAL ALLERGIC RHINITIS DUE TO POLLEN: ICD-10-CM

## 2022-04-20 LAB — UREA BREATH TEST QL: POSITIVE

## 2022-04-20 PROCEDURE — 99214 OFFICE O/P EST MOD 30 MIN: CPT | Performed by: FAMILY MEDICINE

## 2022-04-20 PROCEDURE — 83013 H PYLORI (C-13) BREATH: CPT

## 2022-04-20 RX ORDER — AMOXICILLIN AND CLAVULANATE POTASSIUM 875; 125 MG/1; MG/1
1 TABLET, FILM COATED ORAL 2 TIMES DAILY
Qty: 14 TABLET | Refills: 0 | Status: SHIPPED | OUTPATIENT
Start: 2022-04-20 | End: 2022-04-27 | Stop reason: SDUPTHER

## 2022-04-20 NOTE — ASSESSMENT & PLAN NOTE
Blood pressure goal.  Continue lisinopril 10 mg daily.  Labs are reviewed and up-to-date.  No refills needed.

## 2022-04-20 NOTE — PROGRESS NOTES
Chief Complaint  Hypertension (6 month follow up )    Subjective          Bethanie Forte presents to [facility] today for routine f/u of chronic issues.    She is now on hydroxyzine for anxiety.  Taking as needed and that is working well for her.  Her stress is improved now that she is feeling better physically.  She feels good overall.  She is going for her H. Pylori retest after she leaves her.    She is on lisinopril for hypertension.  Her blood pressure has been well controlled.  She denies chest pain, palpitations, and shortness of air.  She has changed her diet and has lost 8 lbs!      She is on montelukast and Flonase for chronic allergies.  She takes allergy shots weekly.  [facility]  She has had a lot of sinus congestion and headache on the R side for the past 3 weeks.  She has a deviated septum in the nose at baseline.  She is going to see the allergist on Friday.  She did take some amoxicillin that she had 500 mg BID but did not notice much difference.       She is on vitamin D supplement (as well as Jose A/vit D) for vit D deficiency.      Current Outpatient Medications:   •  Calcium 150 MG tablet, Take  by mouth., Disp: , Rfl:   •  Cholecalciferol (VITAMIN D3) 1000 units capsule, Take  by mouth., Disp: , Rfl:   •  EPINEPHrine (EPIPEN) 0.3 MG/0.3ML solution auto-injector injection, As Needed., Disp: , Rfl:   •  fluticasone (FLONASE) 50 MCG/ACT nasal spray, As Needed., Disp: , Rfl:   •  hydrOXYzine (ATARAX) 10 MG tablet, Take 1 tablet by mouth 2 (Two) Times a Day As Needed for Anxiety., Disp: 60 tablet, Rfl: 2  •  lisinopril (PRINIVIL,ZESTRIL) 10 MG tablet, Take 1 tablet by mouth Daily., Disp: 90 tablet, Rfl: 1  •  montelukast (SINGULAIR) 10 MG tablet, Take 10 mg by mouth As Needed., Disp: , Rfl:   •  amoxicillin-clavulanate (Augmentin) 875-125 MG per tablet, Take 1 tablet by mouth 2 (Two) Times a Day for 7 days., Disp: 14 tablet, Rfl:  "0    Allergies:  Patient has no known allergies.      Objective   Vital Signs:   Vitals:    04/20/22 1015   BP: 117/56   BP Location: Left arm   Patient Position: Sitting   Pulse: 70   Temp: 98.3 °F (36.8 °C)   TempSrc: Oral   Weight: 105 kg (232 lb)   Height: 167.6 cm (65.98\")       Physical Exam  Vitals reviewed.   Constitutional:       General: She is not in acute distress.     Appearance: Normal appearance. She is well-developed.   HENT:      Head: Normocephalic and atraumatic.      Right Ear: External ear normal.      Left Ear: External ear normal.   Eyes:      Extraocular Movements: Extraocular movements intact.      Conjunctiva/sclera: Conjunctivae normal.      Pupils: Pupils are equal, round, and reactive to light.   Cardiovascular:      Rate and Rhythm: Normal rate and regular rhythm.      Heart sounds: No murmur heard.  Pulmonary:      Effort: Pulmonary effort is normal.      Breath sounds: Normal breath sounds. No wheezing, rhonchi or rales.   Abdominal:      General: Bowel sounds are normal. There is no distension.      Palpations: Abdomen is soft.      Tenderness: There is no abdominal tenderness.   Musculoskeletal:         General: Normal range of motion.   Neurological:      Mental Status: She is alert.   Psychiatric:         Mood and Affect: Affect normal.        Lab Results   Component Value Date    GLUCOSE 98 02/08/2022    BUN 14 02/08/2022    CREATININE 0.69 02/08/2022    EGFRIFNONA 85 02/08/2022    EGFRIFAFRI 90 03/27/2018    BCR 20.3 02/08/2022    K 4.2 02/08/2022    CO2 29.3 (H) 02/08/2022    CALCIUM 9.9 02/08/2022    ALBUMIN 4.50 02/08/2022    LABIL2 1.7 04/20/2021    AST 14 02/08/2022    ALT 17 02/08/2022          Assessment and Plan    Diagnoses and all orders for this visit:    1. Anxiety (Primary)  Assessment & Plan:  Improved.  She continues on the hydroxyzine 10 mg twice daily as needed.  No refills needed today.  Continue to monitor.      2. Essential hypertension  Assessment & " Plan:  Blood pressure goal.  Continue lisinopril 10 mg daily.  Labs are reviewed and up-to-date.  No refills needed.      3. Non-seasonal allergic rhinitis due to pollen  Assessment & Plan:  Following with Allergy.  They have her on allergy injections and montelukast.      4. Acute recurrent maxillary sinusitis  Assessment & Plan:  Treating right maxillary sinusitis today due to focal right-sided symptoms and symptom duration of almost 3 weeks.  Symptomatic care recommended with OTC analgesics for pain/fever, OTC decongestants and cough suppressants prn.  Stay well hydrated.  Humidifier in the bedroom at night.  Hot tea with lemon and honey.  RTC prn worsening or failure to improve of sx.  She does go to see her allergist as well on Friday and can follow-up further with them.    Orders:  -     amoxicillin-clavulanate (Augmentin) 875-125 MG per tablet; Take 1 tablet by mouth 2 (Two) Times a Day for 7 days.  Dispense: 14 tablet; Refill: 0      Follow Up   Return in about 6 months (around 10/20/2022) for Medicare Wellness.  Patient was given instructions and counseling regarding her condition or for health maintenance advice. Please see specific information pulled into the AVS if appropriate.

## 2022-04-20 NOTE — ASSESSMENT & PLAN NOTE
Treating right maxillary sinusitis today due to focal right-sided symptoms and symptom duration of almost 3 weeks.  Symptomatic care recommended with OTC analgesics for pain/fever, OTC decongestants and cough suppressants prn.  Stay well hydrated.  Humidifier in the bedroom at night.  Hot tea with lemon and honey.  RTC prn worsening or failure to improve of sx.  She does go to see her allergist as well on Friday and can follow-up further with them.

## 2022-04-20 NOTE — ASSESSMENT & PLAN NOTE
Improved.  She continues on the hydroxyzine 10 mg twice daily as needed.  No refills needed today.  Continue to monitor.

## 2022-04-20 NOTE — ASSESSMENT & PLAN NOTE
Checking today to follow-up on clearance of H. pylori.  Stomach symptoms have resolved.  Not currently on medication.

## 2022-04-27 DIAGNOSIS — J01.01 ACUTE RECURRENT MAXILLARY SINUSITIS: ICD-10-CM

## 2022-04-27 RX ORDER — AMOXICILLIN AND CLAVULANATE POTASSIUM 875; 125 MG/1; MG/1
1 TABLET, FILM COATED ORAL 2 TIMES DAILY
Qty: 6 TABLET | Refills: 0 | Status: SHIPPED | OUTPATIENT
Start: 2022-04-27 | End: 2022-04-30

## 2022-05-02 NOTE — TELEPHONE ENCOUNTER
Pt states her ear is better, but her nose is still stopped up.  Wants to know if another 3-4 days of antibiotic should be given to her?  Her last dose was Thursday.  Please advise.

## 2022-05-03 NOTE — TELEPHONE ENCOUNTER
She has had 10 days at this point, which would be considered a complete course.  It is very typical for inflammation to persist even after the infection has been cleared by the antibiotics.  The inflammation is what actually directly causes the clogging of the nose, ears, etc.  I would recommend she go up to twice daily on her Flonase if she is not already doing so.  She can also try nasal saline multiple times through the day to give herself a sinus rinse.  Sometimes adding in an antihistamine such as Zyrtec can speed up the process as well.  I don't think additional antibiotics at this point will do her much good.  Thanks, ADOLFO

## 2022-06-08 ENCOUNTER — OFFICE VISIT (OUTPATIENT)
Dept: GASTROENTEROLOGY | Facility: CLINIC | Age: 67
End: 2022-06-08

## 2022-06-08 VITALS
WEIGHT: 228 LBS | DIASTOLIC BLOOD PRESSURE: 59 MMHG | HEART RATE: 70 BPM | SYSTOLIC BLOOD PRESSURE: 137 MMHG | HEIGHT: 65 IN | BODY MASS INDEX: 37.99 KG/M2

## 2022-06-08 DIAGNOSIS — R10.33 PERIUMBILICAL DISCOMFORT: ICD-10-CM

## 2022-06-08 DIAGNOSIS — R10.13 EPIGASTRIC PAIN: ICD-10-CM

## 2022-06-08 DIAGNOSIS — Z12.11 COLON CANCER SCREENING: Primary | ICD-10-CM

## 2022-06-08 DIAGNOSIS — A04.8 H. PYLORI INFECTION: ICD-10-CM

## 2022-06-08 DIAGNOSIS — R12 HEARTBURN: ICD-10-CM

## 2022-06-08 PROCEDURE — 99214 OFFICE O/P EST MOD 30 MIN: CPT | Performed by: NURSE PRACTITIONER

## 2022-06-08 RX ORDER — DOXYCYCLINE HYCLATE 100 MG/1
100 CAPSULE ORAL 2 TIMES DAILY
Qty: 28 CAPSULE | Refills: 0 | Status: SHIPPED | OUTPATIENT
Start: 2022-06-08 | End: 2022-06-22

## 2022-06-08 RX ORDER — L.ACIDOPH/B.ANIMALIS/B.LONGUM 15B CELL
1 CAPSULE ORAL DAILY
Qty: 30 CAPSULE | Refills: 2 | Status: SHIPPED | OUTPATIENT
Start: 2022-06-08 | End: 2022-07-08

## 2022-06-08 RX ORDER — OMEPRAZOLE 20 MG/1
20 CAPSULE, DELAYED RELEASE ORAL DAILY
Qty: 30 CAPSULE | Refills: 5 | Status: SHIPPED | OUTPATIENT
Start: 2022-06-08 | End: 2022-07-08

## 2022-06-08 RX ORDER — LORATADINE 10 MG/1
10 TABLET ORAL AS NEEDED
COMMUNITY

## 2022-06-08 RX ORDER — METRONIDAZOLE 500 MG/1
500 TABLET ORAL 3 TIMES DAILY
Qty: 42 TABLET | Refills: 0 | Status: SHIPPED | OUTPATIENT
Start: 2022-06-08 | End: 2022-06-22

## 2022-06-08 RX ORDER — DOXYCYCLINE HYCLATE 100 MG/1
100 TABLET, DELAYED RELEASE ORAL 2 TIMES DAILY
Qty: 28 TABLET | Refills: 0 | Status: SHIPPED | OUTPATIENT
Start: 2022-06-08 | End: 2022-06-08 | Stop reason: CLARIF

## 2022-06-08 RX ORDER — BISMUTH SUBSALICYLATE 262 MG
2 TABLET,CHEWABLE ORAL 4 TIMES DAILY
Qty: 112 TABLET | Refills: 0 | Status: SHIPPED | OUTPATIENT
Start: 2022-06-08 | End: 2022-06-22

## 2022-06-08 RX ORDER — POLYETHYLENE GLYCOL 3350, SODIUM SULFATE, SODIUM CHLORIDE, POTASSIUM CHLORIDE, ASCORBIC ACID, SODIUM ASCORBATE 140-9-5.2G
1 KIT ORAL TAKE AS DIRECTED
Qty: 1 EACH | Refills: 0 | Status: SHIPPED | OUTPATIENT
Start: 2022-06-08 | End: 2022-10-06 | Stop reason: ALTCHOICE
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
## 2022-06-10 ENCOUNTER — TELEPHONE (OUTPATIENT)
Dept: GASTROENTEROLOGY | Facility: CLINIC | Age: 67
End: 2022-06-10

## 2022-06-10 NOTE — TELEPHONE ENCOUNTER
Patient left a voicemail regarding her Plenvu. Patient and pharmacy were both confused if it was for the H. Pylori or for her Colonoscopy.     Patient also requested for some advice on wether H. Pylori is contagious or not. Patient is requesting the do's and don'ts with H. Pylori. Please advise.

## 2022-06-13 NOTE — TELEPHONE ENCOUNTER
The Plenvu is for her bowel prep.  H. pylori is only contagious through direct contact such as saliva, vomit or stool. Normally it is contracted through contaminated food or water.

## 2022-06-23 ENCOUNTER — TELEPHONE (OUTPATIENT)
Dept: GASTROENTEROLOGY | Facility: CLINIC | Age: 67
End: 2022-06-23

## 2022-06-23 DIAGNOSIS — A04.8 H. PYLORI INFECTION: Primary | ICD-10-CM

## 2022-06-23 NOTE — TELEPHONE ENCOUNTER
Yes I have placed the order since her EGD is not until October.  Please make sure patient knows proper timeframe and instructions for repeat H. pylori test.

## 2022-06-23 NOTE — TELEPHONE ENCOUNTER
Patient called and requested to know if after her H Pylori meds are finished if she will need to retest. Please advise

## 2022-06-24 NOTE — TELEPHONE ENCOUNTER
Patient called back and was notified of providers recommendations. Patient was given all prep instructions for the repeat breath test

## 2022-07-06 ENCOUNTER — TELEPHONE (OUTPATIENT)
Dept: GASTROENTEROLOGY | Facility: CLINIC | Age: 67
End: 2022-07-06

## 2022-07-06 NOTE — TELEPHONE ENCOUNTER
Patient called and reported that she is finished with the antibiotics from thew H. Pylori. Patient reports that she believes that she has caught a stomach bug and has had diarrhea and is requesting to know if she should be taking the probiotic with diarrhea. Please advise

## 2022-07-08 NOTE — TELEPHONE ENCOUNTER
Patient called the office back to asked if this matter was ever discussed with the provider. I advised that Edouard stated to continue to take the probiotic and if the diarrhea was not resolved in a few days to let us know.  She was not please that she had to call office back for answer. Informed her that the provider and MA were off site yesterday.  She wanted to know if the medication for H Pylori would have caused the diarrhea

## 2022-07-08 NOTE — TELEPHONE ENCOUNTER
Called and spoke with patient regarding some medical advice. I relayed all of the providers advice to patient. Patient reported that she requested to know if the diarrhea from the antibiotic was to get ride of the H Pylori infection in her body. Patient reported that her diarrhea has subsided. Patient also reports now that her tongue was green while on the antibiotic and is now partially black. Please advise

## 2022-07-11 ENCOUNTER — LAB (OUTPATIENT)
Dept: LAB | Facility: HOSPITAL | Age: 67
End: 2022-07-11

## 2022-07-11 ENCOUNTER — TELEPHONE (OUTPATIENT)
Dept: GASTROENTEROLOGY | Facility: CLINIC | Age: 67
End: 2022-07-11

## 2022-07-11 ENCOUNTER — OFFICE VISIT (OUTPATIENT)
Dept: FAMILY MEDICINE CLINIC | Age: 67
End: 2022-07-11

## 2022-07-11 VITALS
HEIGHT: 65 IN | WEIGHT: 224.8 LBS | TEMPERATURE: 98.2 F | OXYGEN SATURATION: 97 % | HEART RATE: 78 BPM | BODY MASS INDEX: 37.45 KG/M2 | SYSTOLIC BLOOD PRESSURE: 130 MMHG | DIASTOLIC BLOOD PRESSURE: 51 MMHG

## 2022-07-11 DIAGNOSIS — R19.7 DIARRHEA, UNSPECIFIED TYPE: Primary | ICD-10-CM

## 2022-07-11 DIAGNOSIS — K14.3 BLACK HAIRY TONGUE: ICD-10-CM

## 2022-07-11 DIAGNOSIS — R19.7 DIARRHEA, UNSPECIFIED TYPE: ICD-10-CM

## 2022-07-11 PROCEDURE — 99213 OFFICE O/P EST LOW 20 MIN: CPT | Performed by: NURSE PRACTITIONER

## 2022-07-11 RX ORDER — DICYCLOMINE HYDROCHLORIDE 10 MG/1
10 CAPSULE ORAL 4 TIMES DAILY PRN
Qty: 120 CAPSULE | Refills: 3 | Status: SHIPPED | OUTPATIENT
Start: 2022-07-11 | End: 2022-10-06

## 2022-07-11 RX ORDER — BISMUTH SUBSALICYLATE 262 MG/1
524 TABLET, CHEWABLE ORAL
COMMUNITY
End: 2022-10-06

## 2022-07-11 RX ORDER — METRONIDAZOLE 500 MG/1
500 TABLET ORAL 3 TIMES DAILY
COMMUNITY
End: 2022-07-13 | Stop reason: CLARIF

## 2022-07-11 RX ORDER — OMEPRAZOLE 20 MG/1
20 CAPSULE, DELAYED RELEASE ORAL DAILY
COMMUNITY
End: 2022-10-06 | Stop reason: ALTCHOICE

## 2022-07-11 NOTE — TELEPHONE ENCOUNTER
Called patient back per request. Patient stated that is still having diarrhea and needs to see a doctor. I explained to patient that last time we had spoken, she reported that her diarrhea had subsided and that it what was reported to the provider. Patient reported that shortly after that phone call ended she had diarrhea again and it has been persistent all weekend. Patient is concerned that she has C. Diff from the antibiotic. Please advise

## 2022-07-11 NOTE — TELEPHONE ENCOUNTER
Please let patient know that I have ordered stool studies for further evaluation diarrhea.  Please also make sure she completes H. pylori breath test as sometimes when H. pylori has not resolved diarrhea can be a symptom.  I am also sending in dicyclomine for patient to use as needed for diarrhea and/or abdominal pain up to 4 times a day.

## 2022-07-11 NOTE — PROGRESS NOTES
"Chief Complaint  Diarrhea (Pt states she has had diarrhea for 8 days now. )    Subjective    Patient is a 67-year-old female in today with complaints of diarrhea for 8 days.  She reports that she recently completed treatment for H. pylori.  Patient reports that during the treatment of H. pylori her tongue was green, and now the tongue has turned black.  She has reached out to her gastroenterologist multiple times but has not heard anything back as of yet.  Patient has tried dietary changes which have not improved her symptoms.  Denies fever or chills at this time.        Bethanie Forte presents to [facility]  Diarrhea   This is a new problem. The current episode started 1 to 4 weeks ago. The problem occurs 2 to 4 times per day. The problem has been unchanged. The stool consistency is described as watery and mucous. The patient states that diarrhea does not awaken her from sleep. Associated symptoms include abdominal pain, chills and increased flatus. Pertinent negatives include no fever. Nothing aggravates the symptoms. Risk factors include recent antibiotic use. She has tried increased fluids and change of diet for the symptoms. The treatment provided no relief.       Objective   Vital Signs:  /51   Pulse 78   Temp 98.2 °F (36.8 °C) (Oral)   Ht 165.1 cm (65\")   Wt 102 kg (224 lb 12.8 oz)   SpO2 97%   BMI 37.41 kg/m²   Estimated body mass index is 37.41 kg/m² as calculated from the following:    Height as of this encounter: 165.1 cm (65\").    Weight as of this encounter: 102 kg (224 lb 12.8 oz).          Physical Exam  HENT:      Head: Normocephalic.      Mouth/Throat:     Cardiovascular:      Rate and Rhythm: Normal rate and regular rhythm.   Pulmonary:      Effort: Pulmonary effort is normal. No respiratory distress.      Breath sounds: Normal breath sounds. No stridor. No wheezing, rhonchi or rales.   Abdominal:      General: Bowel sounds are normal. There is no " distension.      Palpations: Abdomen is soft.   Skin:     General: Skin is warm and dry.   Neurological:      Mental Status: She is alert and oriented to person, place, and time.   Psychiatric:         Mood and Affect: Mood normal.        Result Review :                Assessment and Plan   Diagnoses and all orders for this visit:    1. Diarrhea, unspecified type (Primary)  Comments:  Gastroenterologist has ordered stool studies, will send patient to the lab    2. Black hairy tongue  -     magic mouthwash oral suspension; Swish and spit 5 mL Every 4 (Four) Hours As Needed for Mucositis.  Dispense: 200 mL; Refill: 0  -     nystatin (MYCOSTATIN) 100,000 unit/mL suspension; Swish and swallow 5 mL 4 (Four) Times a Day.  Dispense: 473 mL; Refill: 0             Follow Up   Return if symptoms worsen or fail to improve.  Patient was given instructions and counseling regarding her condition or for health maintenance advice. Please see specific information pulled into the AVS if appropriate.

## 2022-07-11 NOTE — TELEPHONE ENCOUNTER
That is a known side effect of that class of antibiotics. No worries. I am glad the diarrhea has subsided!

## 2022-07-11 NOTE — TELEPHONE ENCOUNTER
Patient called at 9:10 this morning  wants you to call her she said she left message on Friday said is has has diarrhea for 8 days. The hub called at 10:08 stating the patient called them wanting to speak to Edouard Solis MA or Edouard herself. She would like a call back as soon as possible.

## 2022-07-12 ENCOUNTER — LAB (OUTPATIENT)
Dept: LAB | Facility: HOSPITAL | Age: 67
End: 2022-07-12

## 2022-07-12 DIAGNOSIS — R19.7 DIARRHEA, UNSPECIFIED TYPE: ICD-10-CM

## 2022-07-12 LAB
027 TOXIN: ABNORMAL
C DIFF TOX GENS STL QL NAA+PROBE: POSITIVE

## 2022-07-12 PROCEDURE — 87493 C DIFF AMPLIFIED PROBE: CPT

## 2022-07-12 PROCEDURE — 87506 IADNA-DNA/RNA PROBE TQ 6-11: CPT

## 2022-07-13 ENCOUNTER — TELEPHONE (OUTPATIENT)
Dept: GASTROENTEROLOGY | Facility: CLINIC | Age: 67
End: 2022-07-13

## 2022-07-13 DIAGNOSIS — A04.72 C. DIFFICILE DIARRHEA: Primary | ICD-10-CM

## 2022-07-13 LAB
C COLI+JEJ+UPSA DNA STL QL NAA+NON-PROBE: NOT DETECTED
CRYPTOSP DNA STL QL NAA+NON-PROBE: NOT DETECTED
E HISTOLYT DNA STL QL NAA+NON-PROBE: NOT DETECTED
EC STX1+STX2 GENES STL QL NAA+NON-PROBE: NOT DETECTED
G LAMBLIA DNA STL QL NAA+NON-PROBE: NOT DETECTED
S ENT+BONG DNA STL QL NAA+NON-PROBE: NOT DETECTED
SHIGELLA SP+EIEC IPAH ST NAA+NON-PROBE: NOT DETECTED

## 2022-07-13 RX ORDER — VANCOMYCIN HYDROCHLORIDE 125 MG/1
125 CAPSULE ORAL 4 TIMES DAILY
Qty: 40 CAPSULE | Refills: 0 | Status: SHIPPED | OUTPATIENT
Start: 2022-07-13 | End: 2022-07-23

## 2022-07-13 NOTE — TELEPHONE ENCOUNTER
Patient was notified of results and recommendations.     Pharmacy stated that this medication does not need a PA, however this patient's copay will be $1200. Provider was notified and Vancoymicin was sent in.

## 2022-07-13 NOTE — TELEPHONE ENCOUNTER
Patient called and reported that she spoke with the pharmacy and could not afford the Dificid. I advised that a different antibiotic has been sent in for her. Patient verbalized understanding.

## 2022-07-13 NOTE — TELEPHONE ENCOUNTER
----- Message from WHITNEY Wong sent at 7/12/2022  3:59 PM EDT -----  Please let patient know that she is positive for C. difficile.  I am sending in Dificid to her pharmacy for treatment.  Would also recommend that she cleans all bathroom surfaces with bleach in order to prevent spread of C. difficile.

## 2022-08-02 DIAGNOSIS — R19.7 DIARRHEA, UNSPECIFIED TYPE: Primary | ICD-10-CM

## 2022-08-18 ENCOUNTER — OFFICE VISIT (OUTPATIENT)
Dept: FAMILY MEDICINE CLINIC | Age: 67
End: 2022-08-18

## 2022-08-18 VITALS
BODY MASS INDEX: 35.65 KG/M2 | OXYGEN SATURATION: 98 % | DIASTOLIC BLOOD PRESSURE: 68 MMHG | HEIGHT: 65 IN | SYSTOLIC BLOOD PRESSURE: 128 MMHG | HEART RATE: 75 BPM | WEIGHT: 214 LBS

## 2022-08-18 DIAGNOSIS — J01.01 ACUTE RECURRENT MAXILLARY SINUSITIS: Primary | ICD-10-CM

## 2022-08-18 PROCEDURE — 99213 OFFICE O/P EST LOW 20 MIN: CPT | Performed by: NURSE PRACTITIONER

## 2022-08-18 RX ORDER — AMOXICILLIN AND CLAVULANATE POTASSIUM 875; 125 MG/1; MG/1
1 TABLET, FILM COATED ORAL 2 TIMES DAILY
Qty: 10 TABLET | Refills: 0 | Status: SHIPPED | OUTPATIENT
Start: 2022-08-18 | End: 2022-08-23

## 2022-08-26 RX ORDER — LISINOPRIL 10 MG/1
TABLET ORAL
Qty: 90 TABLET | Refills: 0 | Status: SHIPPED | OUTPATIENT
Start: 2022-08-26 | End: 2022-11-14

## 2022-09-02 ENCOUNTER — TELEPHONE (OUTPATIENT)
Dept: GASTROENTEROLOGY | Facility: CLINIC | Age: 67
End: 2022-09-02

## 2022-09-08 ENCOUNTER — TELEPHONE (OUTPATIENT)
Dept: GASTROENTEROLOGY | Facility: CLINIC | Age: 67
End: 2022-09-08

## 2022-09-08 NOTE — TELEPHONE ENCOUNTER
Left message with patient requesting a call back. Patient let a voicemail asking about active orders.

## 2022-09-19 ENCOUNTER — LAB (OUTPATIENT)
Dept: LAB | Facility: HOSPITAL | Age: 67
End: 2022-09-19

## 2022-09-19 DIAGNOSIS — A04.8 H. PYLORI INFECTION: ICD-10-CM

## 2022-09-19 LAB — UREA BREATH TEST QL: NEGATIVE

## 2022-09-19 PROCEDURE — 83013 H PYLORI (C-13) BREATH: CPT

## 2022-09-28 ENCOUNTER — TELEPHONE (OUTPATIENT)
Dept: GASTROENTEROLOGY | Facility: CLINIC | Age: 67
End: 2022-09-28

## 2022-10-04 ENCOUNTER — TELEPHONE (OUTPATIENT)
Dept: GASTROENTEROLOGY | Facility: CLINIC | Age: 67
End: 2022-10-04

## 2022-10-04 NOTE — TELEPHONE ENCOUNTER
Patient  Left voice message at 11:02 wanting  to cancel her procedures on 10/17/22.She would like a call back, letting her know it has been cancelled.

## 2022-10-04 NOTE — TELEPHONE ENCOUNTER
Called patient, to confirm that scopes had been cancelled asked if she would like to reschedule them. Patient was aware that next available was Feb 2023. Does not want to reschedule at this time.

## 2022-10-06 ENCOUNTER — OFFICE VISIT (OUTPATIENT)
Dept: FAMILY MEDICINE CLINIC | Age: 67
End: 2022-10-06

## 2022-10-06 VITALS
BODY MASS INDEX: 36.82 KG/M2 | TEMPERATURE: 99 F | HEIGHT: 65 IN | WEIGHT: 221 LBS | HEART RATE: 87 BPM | DIASTOLIC BLOOD PRESSURE: 85 MMHG | SYSTOLIC BLOOD PRESSURE: 125 MMHG

## 2022-10-06 DIAGNOSIS — J01.00 ACUTE NON-RECURRENT MAXILLARY SINUSITIS: ICD-10-CM

## 2022-10-06 DIAGNOSIS — R05.1 ACUTE COUGH: Primary | ICD-10-CM

## 2022-10-06 LAB
EXPIRATION DATE: NORMAL
FLUAV AG UPPER RESP QL IA.RAPID: NOT DETECTED
FLUBV AG UPPER RESP QL IA.RAPID: NOT DETECTED
INTERNAL CONTROL: NORMAL
Lab: NORMAL
SARS-COV-2 AG UPPER RESP QL IA.RAPID: NOT DETECTED

## 2022-10-06 PROCEDURE — 99213 OFFICE O/P EST LOW 20 MIN: CPT | Performed by: NURSE PRACTITIONER

## 2022-10-06 PROCEDURE — 87428 SARSCOV & INF VIR A&B AG IA: CPT | Performed by: NURSE PRACTITIONER

## 2022-10-06 RX ORDER — DEXTROMETHORPHAN HYDROBROMIDE AND PROMETHAZINE HYDROCHLORIDE 15; 6.25 MG/5ML; MG/5ML
5 SYRUP ORAL NIGHTLY PRN
Qty: 118 ML | Refills: 0 | Status: SHIPPED | OUTPATIENT
Start: 2022-10-06 | End: 2022-10-26

## 2022-10-06 RX ORDER — AMOXICILLIN AND CLAVULANATE POTASSIUM 875; 125 MG/1; MG/1
1 TABLET, FILM COATED ORAL 2 TIMES DAILY
Qty: 20 TABLET | Refills: 0 | Status: SHIPPED | OUTPATIENT
Start: 2022-10-06 | End: 2022-10-26

## 2022-10-06 NOTE — ASSESSMENT & PLAN NOTE
Rest, increase fluids, follow up if symptoms progress or change   covid screen: negative   Flu screen: negative   Continue mucinex or mucinex dm, she wanted another cough rx to take if needded

## 2022-10-06 NOTE — PROGRESS NOTES
Bethanie Forte presents to [facility].    Chief Complaint:  Cough (Sx include: headaches, chest congestion, pt has taken mucinex to treat. )         History of Present Illness:  URI  When did symptoms started 6 days ago   Any exposures: son with URI   Symptoms: cough, headaches, chest congestion with green sputum, clear nasal congestion   Treatment tried: mucinex and mucinex DM     Past Medical History changes:         Covid + 8-    GYNECOLOGICAL HISTORY:             PREVENTIVE HEALTH MAINTENANCE             COLORECTAL CANCER SCREENING: Up to date (colonoscopy q10y; sigmoidoscopy q5y; Cologuard q3y) was last done 10/13/2020, Results are in chart; FIT Hemocult performed: negative 10/13/20     Hepatitis C Medicare Screening: was last done 3/2018       PAP SMEAR: was last done 2018 - Dr. Erwin Baig with normal results         Surgical History:               Family History:     Father:  at age 46; Cause of death was MI     Mother:  at age 90's;  Dementia;  RLS     Brother(s): Coronary Artery Disease     Sister(s): Hypertension     Son(s): 1 son(s) total;  Hypertension         Social History:     Occupation: Homemaker     Marital Status:      Children: 1 child       Review of Systems:  Review of Systems   Constitutional: Positive for fever (LGF).   HENT: Positive for sore throat (slight, initially ).    Respiratory: Negative for shortness of breath and wheezing.    Cardiovascular: Negative for chest pain.          Current Outpatient Medications:   •  Calcium 150 MG tablet, Take  by mouth Daily., Disp: , Rfl:   •  Cholecalciferol (VITAMIN D3) 1000 units capsule, Take  by mouth., Disp: , Rfl:   •  EPINEPHrine (EPIPEN) 0.3 MG/0.3ML solution auto-injector injection, As Needed., Disp: , Rfl:   •  fluticasone (FLONASE) 50 MCG/ACT nasal spray, 2 sprays into the nostril(s) as directed by provider As Needed., Disp: , Rfl:   •  hydrOXYzine (ATARAX) 10 MG tablet, Take  "1 tablet by mouth 2 (Two) Times a Day As Needed for Anxiety., Disp: 60 tablet, Rfl: 2  •  lisinopril (PRINIVIL,ZESTRIL) 10 MG tablet, TAKE 1 TABLET BY MOUTH ONCE DAILY, Disp: 90 tablet, Rfl: 0  •  loratadine (CLARITIN) 10 MG tablet, Take 10 mg by mouth As Needed., Disp: , Rfl:   •  montelukast (SINGULAIR) 10 MG tablet, Take 10 mg by mouth As Needed., Disp: , Rfl:   •  amoxicillin-clavulanate (Augmentin) 875-125 MG per tablet, Take 1 tablet by mouth 2 (Two) Times a Day., Disp: 20 tablet, Rfl: 0  •  promethazine-dextromethorphan (PROMETHAZINE-DM) 6.25-15 MG/5ML syrup, Take 5 mL by mouth At Night As Needed for Cough., Disp: 118 mL, Rfl: 0    Vital Signs:   Vitals:    10/06/22 1319   BP: 125/85   BP Location: Left arm   Patient Position: Sitting   Cuff Size: Adult   Pulse: 87   Temp: 99 °F (37.2 °C)   TempSrc: Oral   Weight: 100 kg (221 lb)   Height: 165.1 cm (65\")         Physical Exam:  Physical Exam  Constitutional:       General: She is not in acute distress.     Appearance: Normal appearance.   HENT:      Right Ear: Tympanic membrane, ear canal and external ear normal.      Left Ear: Tympanic membrane, ear canal and external ear normal.      Nose: Congestion present.      Comments: Bilateral maxillary and frontal sinus tenderness      Mouth/Throat:      Pharynx: Oropharynx is clear. No posterior oropharyngeal erythema.   Cardiovascular:      Rate and Rhythm: Normal rate and regular rhythm.      Heart sounds: No murmur heard.  Pulmonary:      Effort: Pulmonary effort is normal.      Breath sounds: Normal breath sounds.   Lymphadenopathy:      Cervical: No cervical adenopathy.   Neurological:      Mental Status: She is alert.   Psychiatric:         Mood and Affect: Mood normal.         Behavior: Behavior normal.         Result Review      The following data was reviewed by: WHITNEY Edmonds on 10/06/2022:    Results for orders placed or performed in visit on 10/06/22   POCT SARS-CoV-2 Antigen KAIA + Flu    " Specimen: Swab   Result Value Ref Range    SARS Antigen Not Detected Not Detected, Presumptive Negative    Influenza A Antigen KAIA Not Detected Not Detected    Influenza B Antigen KAIA Not Detected Not Detected    Internal Control Passed Passed    Lot Number [id number]     Expiration Date 1/28/23                Assessment and Plan:          Diagnoses and all orders for this visit:    1. Acute cough (Primary)  Assessment & Plan:  Rest, increase fluids, follow up if symptoms progress or change   covid screen: negative   Flu screen: negative   Continue mucinex or mucinex dm, she wanted another cough rx to take if needded    Orders:  -     POCT SARS-CoV-2 Antigen KAIA + Flu  -     promethazine-dextromethorphan (PROMETHAZINE-DM) 6.25-15 MG/5ML syrup; Take 5 mL by mouth At Night As Needed for Cough.  Dispense: 118 mL; Refill: 0    2. Acute non-recurrent maxillary sinusitis  Assessment & Plan:  Cover her with Augmentin     Orders:  -     amoxicillin-clavulanate (Augmentin) 875-125 MG per tablet; Take 1 tablet by mouth 2 (Two) Times a Day.  Dispense: 20 tablet; Refill: 0        Follow Up   Return if symptoms worsen or fail to improve.  Patient was given instructions and counseling regarding her condition or for health maintenance advice. Please see specific information pulled into the AVS if appropriate.

## 2022-10-11 ENCOUNTER — TELEPHONE (OUTPATIENT)
Dept: FAMILY MEDICINE CLINIC | Age: 67
End: 2022-10-11

## 2022-10-11 DIAGNOSIS — E55.9 VITAMIN D DEFICIENCY: ICD-10-CM

## 2022-10-11 DIAGNOSIS — I10 ESSENTIAL HYPERTENSION: Primary | ICD-10-CM

## 2022-10-11 NOTE — TELEPHONE ENCOUNTER
Caller: Bethanie Forte A    Relationship to patient: Self    Best call back number: [phone]    Patient is needing: PATIENT HAS AN APPT ON 10.26.22 AND WOULD LIKE BLOOD WORK ORDERED BEFORE VISIT.     PATIENT IS ASKING FOR THE ORDER TO BE PUT IN SO IT CAN BE DONE AND RESULTS BACK BEFORE APPT.     PATIENT WOULD LIKE A CALL BACK WHEN ORDERED OR MESSAGE HER ON [employer] ITS ORDERED.    PATIENT STATES SHE MESSAGED ON [employer] 10.5.22 REQUESTING THIS INFORMATION AND NO ONE RESPONDED TO HER.     PATIENT ALSO STATES THAT HER FAMILY HISTORY IS INCORRECT. PATIENT STATES THAT HER MOTHER DID NOT DIE OF DEMENTIA AND HER FATHER DOES NOT HAVE CANCER. PATIENT IS VERY CONCERNED WHERE THIS INFORMATION CAME FROM AND IS TAKING SCREEN SHOTS OF THE INFORMATION.        MANNIE & CV Note

## 2022-10-11 NOTE — TELEPHONE ENCOUNTER
Orders are in for her bloodwork, so she can come by at her convenience to have them done.  Would be best done fasting.  I have ordered a metabolic panel, cholesterol panel and vit D level.  If there is something else she is interested in checking, please let me know.  [employer] messages are not the best way to communicate urgently, so please always contact the office if you have not heard back from a [employer] message after a couple of days.  In this instance, I was out of the office on vacation and only returned today.  Regarding the family history, I'm afraid I'm unable to see who entered the incorrect items.  I actually don't see any sort of cancer listed for her father, so I'm not sure why that's showing up for her.  I did see the dementia diagnosis listed under her mother, so I have removed that.  We always want to make sure that a person's medical chart is as accurate as possible, so thanks for bringing that to our attention.  Thanks, ADOLFO

## 2022-10-21 ENCOUNTER — LAB (OUTPATIENT)
Dept: LAB | Facility: HOSPITAL | Age: 67
End: 2022-10-21

## 2022-10-21 DIAGNOSIS — I10 ESSENTIAL HYPERTENSION: ICD-10-CM

## 2022-10-21 DIAGNOSIS — E55.9 VITAMIN D DEFICIENCY: ICD-10-CM

## 2022-10-21 LAB
25(OH)D3 SERPL-MCNC: 55.6 NG/ML (ref 30–100)
ALBUMIN SERPL-MCNC: 4.2 G/DL (ref 3.5–5.2)
ALBUMIN/GLOB SERPL: 1.8 G/DL
ALP SERPL-CCNC: 73 U/L (ref 39–117)
ALT SERPL W P-5'-P-CCNC: 14 U/L (ref 1–33)
ANION GAP SERPL CALCULATED.3IONS-SCNC: 10.4 MMOL/L (ref 5–15)
AST SERPL-CCNC: 14 U/L (ref 1–32)
BILIRUB SERPL-MCNC: 0.3 MG/DL (ref 0–1.2)
BUN SERPL-MCNC: 14 MG/DL (ref 8–23)
BUN/CREAT SERPL: 22.2 (ref 7–25)
CALCIUM SPEC-SCNC: 10.2 MG/DL (ref 8.6–10.5)
CHLORIDE SERPL-SCNC: 106 MMOL/L (ref 98–107)
CHOLEST SERPL-MCNC: 173 MG/DL (ref 0–200)
CO2 SERPL-SCNC: 27.6 MMOL/L (ref 22–29)
CREAT SERPL-MCNC: 0.63 MG/DL (ref 0.57–1)
EGFRCR SERPLBLD CKD-EPI 2021: 96.2 ML/MIN/1.73
GLOBULIN UR ELPH-MCNC: 2.3 GM/DL
GLUCOSE SERPL-MCNC: 91 MG/DL (ref 65–99)
HDLC SERPL-MCNC: 61 MG/DL (ref 40–60)
LDLC SERPL CALC-MCNC: 93 MG/DL (ref 0–100)
LDLC/HDLC SERPL: 1.5 {RATIO}
POTASSIUM SERPL-SCNC: 4.2 MMOL/L (ref 3.5–5.2)
PROT SERPL-MCNC: 6.5 G/DL (ref 6–8.5)
SODIUM SERPL-SCNC: 144 MMOL/L (ref 136–145)
TRIGL SERPL-MCNC: 104 MG/DL (ref 0–150)
VLDLC SERPL-MCNC: 19 MG/DL (ref 5–40)

## 2022-10-21 PROCEDURE — 80053 COMPREHEN METABOLIC PANEL: CPT

## 2022-10-21 PROCEDURE — 82306 VITAMIN D 25 HYDROXY: CPT

## 2022-10-21 PROCEDURE — 80061 LIPID PANEL: CPT

## 2022-10-21 PROCEDURE — 36415 COLL VENOUS BLD VENIPUNCTURE: CPT

## 2022-10-26 ENCOUNTER — OFFICE VISIT (OUTPATIENT)
Dept: FAMILY MEDICINE CLINIC | Age: 67
End: 2022-10-26

## 2022-10-26 VITALS
WEIGHT: 220.6 LBS | DIASTOLIC BLOOD PRESSURE: 49 MMHG | HEART RATE: 77 BPM | SYSTOLIC BLOOD PRESSURE: 138 MMHG | BODY MASS INDEX: 36.75 KG/M2 | HEIGHT: 65 IN

## 2022-10-26 DIAGNOSIS — I10 ESSENTIAL HYPERTENSION: ICD-10-CM

## 2022-10-26 DIAGNOSIS — K21.9 GASTROESOPHAGEAL REFLUX DISEASE WITHOUT ESOPHAGITIS: ICD-10-CM

## 2022-10-26 DIAGNOSIS — Z12.11 COLON CANCER SCREENING: ICD-10-CM

## 2022-10-26 DIAGNOSIS — R22.1 MASS OF RIGHT SIDE OF NECK: ICD-10-CM

## 2022-10-26 DIAGNOSIS — Z23 ENCOUNTER FOR IMMUNIZATION: ICD-10-CM

## 2022-10-26 DIAGNOSIS — Z00.00 ANNUAL PHYSICAL EXAM: Primary | ICD-10-CM

## 2022-10-26 DIAGNOSIS — J30.1 NON-SEASONAL ALLERGIC RHINITIS DUE TO POLLEN: ICD-10-CM

## 2022-10-26 PROBLEM — A04.8 H. PYLORI INFECTION: Status: RESOLVED | Noted: 2022-06-08 | Resolved: 2022-10-26

## 2022-10-26 PROBLEM — R12 HEARTBURN: Status: RESOLVED | Noted: 2022-06-08 | Resolved: 2022-10-26

## 2022-10-26 PROBLEM — J01.01 ACUTE RECURRENT MAXILLARY SINUSITIS: Status: RESOLVED | Noted: 2022-04-20 | Resolved: 2022-10-26

## 2022-10-26 PROBLEM — R05.1 ACUTE COUGH: Status: RESOLVED | Noted: 2022-10-06 | Resolved: 2022-10-26

## 2022-10-26 PROBLEM — J01.00 ACUTE NON-RECURRENT MAXILLARY SINUSITIS: Status: RESOLVED | Noted: 2022-10-06 | Resolved: 2022-10-26

## 2022-10-26 PROCEDURE — G0439 PPPS, SUBSEQ VISIT: HCPCS | Performed by: FAMILY MEDICINE

## 2022-10-26 PROCEDURE — 99214 OFFICE O/P EST MOD 30 MIN: CPT | Performed by: FAMILY MEDICINE

## 2022-10-26 PROCEDURE — 1159F MED LIST DOCD IN RCRD: CPT | Performed by: FAMILY MEDICINE

## 2022-10-26 PROCEDURE — 1170F FXNL STATUS ASSESSED: CPT | Performed by: FAMILY MEDICINE

## 2022-10-26 RX ORDER — AZELASTINE 1 MG/ML
2 SPRAY, METERED NASAL DAILY PRN
Qty: 30 ML | Refills: 0 | Status: SHIPPED | OUTPATIENT
Start: 2022-10-26

## 2022-10-26 NOTE — ASSESSMENT & PLAN NOTE
Blood pressure at goal.  Continue lisinopril 10 mg daily.  No refills needed.  Labs reviewed and up-to-date.

## 2022-10-26 NOTE — ASSESSMENT & PLAN NOTE
She is UTD on colorectal cancer screening; she does do FIT testing yearly, last done 12/2021.  Pap smear is no longer indicated by age and history.  She is up-to-date on mammogram, last done 12/2021 and this was normal.  She is reportedly UTD on DEXA, done at [facility].  She is UTD on COVID (due for bivalent booster - declined today), Pneumovax (10/2021), Prevnar-13 (10/2020) and Zostavax (11/2015).  She is due for Shingrix, Td and flu shot; declines all today as she is sick but plans to come back for that.  She is UTD on routine labs.

## 2022-10-26 NOTE — ASSESSMENT & PLAN NOTE
Following with Dr. Andres and she has an appointment with him next week.  She is already on Singulair, Claritin and Flonase.  She has been taking allergy shots but stopped those as she was worried that they were causing her excessive dry mouth in addition to the other medications.  I am going to send in some azelastine for her to try, but overall I have encouraged her to talk further with Allergy as this has been a longstanding and complex issue for her.

## 2022-10-26 NOTE — PROGRESS NOTES
The ABCs of the Annual Wellness Visit  Subsequent Medicare Wellness Visit    Chief Complaint   Patient presents with   • Medicare Wellness-subsequent      Subjective    History of Present Illness:  Bethanie Forte is a 67 y.o. female who presents for a Subsequent Medicare Wellness Visit.    She is UTD on colorectal cancer screening; she does do FIT testing yearly, last done 12/2021.  Pap smear is no longer indicated by age and history.  She is up-to-date on mammogram, last done 12/2021 and this was normal.  She is reportedly UTD on DEXA, done at [facility] in [facility].  She is UTD on COVID (due for bivalent booster), Pneumovax (10/2021), Prevnar-13 (10/2020) and Zostavax (11/2015).  She is due for Shingrix, Td and flu shot.  She is UTD on routine labs.     She has a nontender mass on the R neck, just over the jawline.  It has been present for 3-4 years now.  No significant change in size though it does sometimes seem to get bigger or smaller.  She has a lot of dry mouth.  U/S done 18 months ago showed cluster of 3 LNs.  No glandular enlargement noted at that time.    She is on hydroxyzine for anxiety.  Taking as needed and that is working well for her.  Her stress is improved now that she is feeling better physically.  She feels good overall.      She is on lisinopril for hypertension.  Her blood pressure has been well controlled.  She denies chest pain, palpitations, and shortness of breath.       She is on Claritin, montelukast and Flonase for chronic allergies.  She is no longer taking allergy shots weekly.  These are done by [facility].  She has an appt with Dr. Andres next Thursday.        She is on vitamin D supplement (as well as Jose A/vit D) for vit D deficiency.    The following portions of the patient's history were reviewed and   updated as appropriate: allergies, current medications, past family history, past medical history, past social history, past surgical history and  problem list.    Compared to one year ago, the patient feels her physical   health is the same.    Compared to one year ago, the patient feels her mental   health is the same.    Recent Hospitalizations:  She was not admitted to the hospital during the last year.       Current Medical Providers:  Patient Care Team:  Alivia Montano MD as PCP - General    Outpatient Medications Prior to Visit   Medication Sig Dispense Refill   • Calcium 150 MG tablet Take  by mouth Daily.     • Cholecalciferol (VITAMIN D3) 1000 units capsule Take  by mouth.     • EPINEPHrine (EPIPEN) 0.3 MG/0.3ML solution auto-injector injection As Needed.     • fluticasone (FLONASE) 50 MCG/ACT nasal spray 2 sprays into the nostril(s) as directed by provider As Needed.     • hydrOXYzine (ATARAX) 10 MG tablet Take 1 tablet by mouth 2 (Two) Times a Day As Needed for Anxiety. 60 tablet 2   • lisinopril (PRINIVIL,ZESTRIL) 10 MG tablet TAKE 1 TABLET BY MOUTH ONCE DAILY 90 tablet 0   • loratadine (CLARITIN) 10 MG tablet Take 10 mg by mouth As Needed.     • montelukast (SINGULAIR) 10 MG tablet Take 10 mg by mouth As Needed.     • amoxicillin-clavulanate (Augmentin) 875-125 MG per tablet Take 1 tablet by mouth 2 (Two) Times a Day. 20 tablet 0   • promethazine-dextromethorphan (PROMETHAZINE-DM) 6.25-15 MG/5ML syrup Take 5 mL by mouth At Night As Needed for Cough. 118 mL 0     No facility-administered medications prior to visit.       No opioid medication identified on active medication list. I have reviewed chart for other potential  high risk medication/s and harmful drug interactions in the elderly.          Aspirin is not on active medication list.  Aspirin use is not indicated based on review of current medical condition/s. Risk of harm outweighs potential benefits.  .    Patient Active Problem List   Diagnosis   • Essential hypertension   • OAB (overactive bladder)   • Vitamin D deficiency   • Anxiety   • Other fatigue   • Non-seasonal allergic  "rhinitis due to pollen   • Annual physical exam   • Gastroesophageal reflux disease without esophagitis   • Colon cancer screening   • Mass of right side of neck     Advance Care Planning  Advance Directive is not on file.  ACP discussion was held with the patient during this visit. Patient has an advance directive (not in EMR), copy requested.    Review of Systems   Constitutional: Negative for chills, fatigue and fever.   HENT: Positive for congestion and rhinorrhea. Negative for hearing loss.         +dry mouth   Eyes: Negative for pain and visual disturbance.   Respiratory: Negative for cough and shortness of breath.    Cardiovascular: Negative for chest pain and palpitations.   Gastrointestinal: Negative for abdominal pain, constipation, diarrhea, nausea and vomiting.   Genitourinary: Negative for difficulty urinating and dysuria.   Musculoskeletal: Negative for arthralgias and myalgias.   Neurological: Negative for weakness and numbness.   Psychiatric/Behavioral: Negative for dysphoric mood and sleep disturbance. The patient is not nervous/anxious.         Objective    Vitals:    10/26/22 1022   BP: 138/49   BP Location: Left arm   Patient Position: Sitting   Pulse: 77   Weight: 100 kg (220 lb 9.6 oz)   Height: 165.1 cm (65\")     Estimated body mass index is 36.71 kg/m² as calculated from the following:    Height as of this encounter: 165.1 cm (65\").    Weight as of this encounter: 100 kg (220 lb 9.6 oz).    Class 2 Severe Obesity (BMI >=35 and <=39.9). Obesity-related health conditions include the following: hypertension. Obesity is improving with lifestyle modifications. BMI is is above average; BMI management plan is completed. We discussed portion control and increasing exercise.      Does the patient have evidence of cognitive impairment? No    Physical Exam  Vitals reviewed.   Constitutional:       General: She is not in acute distress.     Appearance: Normal appearance. She is well-developed.   HENT:    "   Head: Normocephalic and atraumatic.      Right Ear: External ear normal.      Left Ear: External ear normal.      Mouth/Throat:      Mouth: Mucous membranes are moist.   Eyes:      Extraocular Movements: Extraocular movements intact.      Conjunctiva/sclera: Conjunctivae normal.      Pupils: Pupils are equal, round, and reactive to light.   Cardiovascular:      Rate and Rhythm: Normal rate and regular rhythm.      Heart sounds: No murmur heard.  Pulmonary:      Effort: Pulmonary effort is normal.      Breath sounds: Normal breath sounds. No wheezing, rhonchi or rales.   Abdominal:      General: Bowel sounds are normal. There is no distension.      Palpations: Abdomen is soft.      Tenderness: There is no abdominal tenderness.   Musculoskeletal:         General: Normal range of motion.   Skin:     General: Skin is warm and dry.   Neurological:      Mental Status: She is alert and oriented to person, place, and time.      Deep Tendon Reflexes: Reflexes normal.   Psychiatric:         Mood and Affect: Mood and affect normal.         Behavior: Behavior normal.         Thought Content: Thought content normal.         Judgment: Judgment normal.       Lab Results   Component Value Date    TRIG 104 10/21/2022    HDL 61 (H) 10/21/2022    LDL 93 10/21/2022    VLDL 19 10/21/2022            HEALTH RISK ASSESSMENT    Smoking Status:  Social History     Tobacco Use   Smoking Status Never   Smokeless Tobacco Never   Tobacco Comments    none     Alcohol Consumption:  Social History     Substance and Sexual Activity   Alcohol Use Not Currently     Fall Risk Screen:    STEADI Fall Risk Assessment was completed, and patient is at LOW risk for falls.Assessment completed on:10/26/2022    Depression Screening:  PHQ-2/PHQ-9 Depression Screening 10/26/2022   Retired PHQ-9 Total Score -   Retired Total Score -   Little Interest or Pleasure in Doing Things 0-->not at all   Feeling Down, Depressed or Hopeless 0-->not at all   PHQ-9: Brief  Depression Severity Measure Score 0       Health Habits and Functional and Cognitive Screening:  Functional & Cognitive Status 10/26/2022   Do you have difficulty preparing food and eating? No   Do you have difficulty bathing yourself, getting dressed or grooming yourself? No   Do you have difficulty using the toilet? No   Do you have difficulty moving around from place to place? No   Do you have trouble with steps or getting out of a bed or a chair? No   Current Diet Well Balanced Diet   Dental Exam Not up to date   Eye Exam Not up to date   Exercise (times per week) 5 times per week   Current Exercises Include Yard Work   Do you need help using the phone?  No   Are you deaf or do you have serious difficulty hearing?  No   Do you need help with transportation? No   Do you need help shopping? No   Do you need help preparing meals?  No   Do you need help with housework?  No   Do you need help with laundry? No   Do you need help taking your medications? No   Do you need help managing money? No   Do you ever drive or ride in a car without wearing a seat belt? No   Have you felt unusual stress, anger or loneliness in the last month? No   Who do you live with? Alone   If you need help, do you have trouble finding someone available to you? No   Have you been bothered in the last four weeks by sexual problems? No   Do you have difficulty concentrating, remembering or making decisions? No       Age-appropriate Screening Schedule:  Refer to the list below for future screening recommendations based on patient's age, sex and/or medical conditions. Orders for these recommended tests are listed in the plan section. The patient has been provided with a written plan.    Health Maintenance   Topic Date Due   • TDAP/TD VACCINES (1 - Tdap) Never done   • ZOSTER VACCINE (1 of 2) Never done   • DXA SCAN  07/24/2019   • INFLUENZA VACCINE  03/31/2023 (Originally 8/1/2022)   • MAMMOGRAM  12/02/2023              Assessment & Plan   CMS  Preventative Services Quick Reference  Risk Factors Identified During Encounter  Immunizations Discussed/Encouraged (specific Immunizations; Tdap, Influenza, Shingrix and COVID19  The above risks/problems have been discussed with the patient.  Follow up actions/plans if indicated are seen below in the Assessment/Plan Section.  Pertinent information has been shared with the patient in the After Visit Summary.    Diagnoses and all orders for this visit:    1. Annual physical exam (Primary)  Assessment & Plan:  She is UTD on colorectal cancer screening; she does do FIT testing yearly, last done 12/2021.  Pap smear is no longer indicated by age and history.  She is up-to-date on mammogram, last done 12/2021 and this was normal.  She is reportedly UTD on DEXA, done at [facility] in [facility].  She is UTD on COVID (due for bivalent booster - declined today), Pneumovax (10/2021), Prevnar-13 (10/2020) and Zostavax (11/2015).  She is due for Shingrix, Td and flu shot; declines all today as she is sick but plans to come back for that.  She is UTD on routine labs.       2. Mass of right side of neck  Assessment & Plan:  She has a mass on the right side of her neck that previously was evaluated with ultrasound about 18 months ago and it showed a cluster of 3 lymph nodes.  We are can a follow-up with a repeat ultrasound today as she has not noticed any significant improvement.  Consider a CT of the neck soft tissues depending on those results.    Orders:  -     US Head Neck Soft Tissue; Future    3. Essential hypertension  Assessment & Plan:  Blood pressure at goal.  Continue lisinopril 10 mg daily.  No refills needed.  Labs reviewed and up-to-date.      4. Non-seasonal allergic rhinitis due to pollen  Assessment & Plan:  Following with Dr. Andres and she has an appointment with him next week.  She is already on Singulair, Claritin and Flonase.  She has been taking allergy shots but stopped those as she was worried  that they were causing her excessive dry mouth in addition to the other medications.  I am going to send in some azelastine for her to try, but overall I have encouraged her to talk further with Allergy as this has been a longstanding and complex issue for her.    Orders:  -     azelastine (ASTELIN) 0.1 % nasal spray; 2 sprays into the nostril(s) as directed by provider Daily As Needed for Rhinitis or Allergies. Use in each nostril as directed  Dispense: 30 mL; Refill: 0    5. Gastroesophageal reflux disease without esophagitis    6. Encounter for immunization    7. Colon cancer screening  -     Occult Blood, Fecal By Immunoassay - Stool, Per Rectum; Future      Follow Up:   Return in about 6 months (around 4/26/2023) for Recheck.     An After Visit Summary and PPPS were made available to the patient.

## 2022-10-26 NOTE — ASSESSMENT & PLAN NOTE
She has a mass on the right side of her neck that previously was evaluated with ultrasound about 18 months ago and it showed a cluster of 3 lymph nodes.  We are can a follow-up with a repeat ultrasound today as she has not noticed any significant improvement.  Consider a CT of the neck soft tissues depending on those results.

## 2022-10-28 ENCOUNTER — LAB (OUTPATIENT)
Dept: LAB | Facility: HOSPITAL | Age: 67
End: 2022-10-28

## 2022-10-28 DIAGNOSIS — Z12.11 COLON CANCER SCREENING: ICD-10-CM

## 2022-10-28 DIAGNOSIS — R19.7 DIARRHEA, UNSPECIFIED TYPE: ICD-10-CM

## 2022-10-28 LAB — HEMOCCULT STL QL IA: NEGATIVE

## 2022-10-28 PROCEDURE — 82274 ASSAY TEST FOR BLOOD FECAL: CPT

## 2022-11-04 ENCOUNTER — HOSPITAL ENCOUNTER (OUTPATIENT)
Dept: ULTRASOUND IMAGING | Facility: HOSPITAL | Age: 67
Discharge: HOME | End: 2022-11-04
Admitting: FAMILY MEDICINE

## 2022-11-04 DIAGNOSIS — R22.1 MASS OF RIGHT SIDE OF NECK: ICD-10-CM

## 2022-11-04 PROCEDURE — 76536 US EXAM OF HEAD AND NECK: CPT

## 2022-11-10 ENCOUNTER — TELEPHONE (OUTPATIENT)
Dept: FAMILY MEDICINE CLINIC | Age: 67
End: 2022-11-10

## 2022-11-10 NOTE — TELEPHONE ENCOUNTER
Caller: Bethanie Forte    Relationship: Self    Best call back number:[phone]    Caller requesting test results: PATIENT    What test was performed: ULTRASOUND    When was the test performed:11/04/2022    Where was the test performed: N/A    Additional notes:PATIENT WOULD LIKE A CALL BACK FROM [facility] TO GO OVER HER ULTRASOUND RESULTS.

## 2022-11-14 RX ORDER — LISINOPRIL 10 MG/1
TABLET ORAL
Qty: 90 TABLET | Refills: 1 | Status: SHIPPED | OUTPATIENT
Start: 2022-11-14

## 2022-11-22 ENCOUNTER — TRANSCRIBE ORDERS (OUTPATIENT)
Dept: ADMINISTRATIVE | Facility: HOSPITAL | Age: 67
End: 2022-11-22

## 2022-11-22 DIAGNOSIS — Z12.31 SCREENING MAMMOGRAM FOR BREAST CANCER: Primary | ICD-10-CM

## 2022-12-02 DIAGNOSIS — Z12.31 ENCOUNTER FOR SCREENING MAMMOGRAM FOR MALIGNANT NEOPLASM OF BREAST: Primary | ICD-10-CM

## 2022-12-05 ENCOUNTER — TELEPHONE (OUTPATIENT)
Dept: FAMILY MEDICINE CLINIC | Age: 67
End: 2022-12-05

## 2022-12-05 DIAGNOSIS — Z12.11 COLON CANCER SCREENING: Primary | ICD-10-CM

## 2022-12-05 NOTE — TELEPHONE ENCOUNTER
Left detailed message on VM.  Please sign orders.  This encounter was created in error - please disregard.

## 2022-12-05 NOTE — TELEPHONE ENCOUNTER
----- Message from Alivia Montano MD sent at 12/2/2021 10:09 PM EST -----  Please let pt know  is due for 's yearly hemoccult for colon cancer screen.  Please pend order for FIT hemoccult, dx colon cancer screening, and send to me to sign.   Thanks, ADOLFO

## 2023-01-24 ENCOUNTER — APPOINTMENT (OUTPATIENT)
Dept: MAMMOGRAPHY | Facility: HOSPITAL | Age: 68
End: 2023-01-24
Payer: MEDICARE

## 2023-02-09 ENCOUNTER — HOSPITAL ENCOUNTER (OUTPATIENT)
Dept: MAMMOGRAPHY | Facility: HOSPITAL | Age: 68
Discharge: HOME | End: 2023-02-09
Admitting: FAMILY MEDICINE
Payer: MEDICARE

## 2023-02-09 DIAGNOSIS — Z12.31 ENCOUNTER FOR SCREENING MAMMOGRAM FOR MALIGNANT NEOPLASM OF BREAST: ICD-10-CM

## 2023-02-09 PROCEDURE — 77063 BREAST TOMOSYNTHESIS BI: CPT

## 2023-02-09 PROCEDURE — 77067 SCR MAMMO BI INCL CAD: CPT

## 2023-04-18 ENCOUNTER — TELEPHONE (OUTPATIENT)
Dept: FAMILY MEDICINE CLINIC | Age: 68
End: 2023-04-18
Payer: MEDICARE

## 2023-04-18 ENCOUNTER — OFFICE VISIT (OUTPATIENT)
Dept: FAMILY MEDICINE CLINIC | Age: 68
End: 2023-04-18
Payer: MEDICARE

## 2023-04-18 VITALS
WEIGHT: 232.6 LBS | DIASTOLIC BLOOD PRESSURE: 55 MMHG | HEIGHT: 65 IN | OXYGEN SATURATION: 97 % | BODY MASS INDEX: 38.75 KG/M2 | RESPIRATION RATE: 18 BRPM | HEART RATE: 76 BPM | TEMPERATURE: 97.6 F | SYSTOLIC BLOOD PRESSURE: 149 MMHG

## 2023-04-18 DIAGNOSIS — J06.9 ACUTE UPPER RESPIRATORY INFECTION, UNSPECIFIED: Primary | ICD-10-CM

## 2023-04-18 DIAGNOSIS — I10 ESSENTIAL HYPERTENSION: ICD-10-CM

## 2023-04-18 DIAGNOSIS — J01.00 ACUTE MAXILLARY SINUSITIS, RECURRENCE NOT SPECIFIED: ICD-10-CM

## 2023-04-18 DIAGNOSIS — I10 ESSENTIAL HYPERTENSION: Primary | ICD-10-CM

## 2023-04-18 PROCEDURE — 3077F SYST BP >= 140 MM HG: CPT | Performed by: NURSE PRACTITIONER

## 2023-04-18 PROCEDURE — 99213 OFFICE O/P EST LOW 20 MIN: CPT | Performed by: NURSE PRACTITIONER

## 2023-04-18 PROCEDURE — 3078F DIAST BP <80 MM HG: CPT | Performed by: NURSE PRACTITIONER

## 2023-04-18 PROCEDURE — 87428 SARSCOV & INF VIR A&B AG IA: CPT | Performed by: NURSE PRACTITIONER

## 2023-04-18 RX ORDER — AMOXICILLIN AND CLAVULANATE POTASSIUM 875; 125 MG/1; MG/1
1 TABLET, FILM COATED ORAL 2 TIMES DAILY
Qty: 20 TABLET | Refills: 0 | Status: SHIPPED | OUTPATIENT
Start: 2023-04-18

## 2023-04-18 NOTE — PROGRESS NOTES
"Bethanie Forte presents to [facility].    Chief Complaint:  Cough (Sinus congestion, sinus pressure, bilateral ear pain worse on the right, feels like head under water, dizziness, \"stuffed up\", shortness of breath due to congestion, headache, /Denies fever, nausea, vomiting, diarrhea, and chest pain. /Symptoms started 9 days ago. No ill contacts to knowledge)         History of Present Illness:  URI  When did symptoms started 9 days ago   Any exposures: none   Symptoms:  sinus congestion and sinus pressure, R>L ear pain   Treatment tried:sudafed and mucinex   Hx allergies    Past Medical History changes since :         Covid +     GYNECOLOGICAL HISTORY:             PREVENTIVE HEALTH MAINTENANCE             COLORECTAL CANCER SCREENING: Up to date (colonoscopy q10y; sigmoidoscopy q5y; Cologuard q3y) was last done 10/13/2020, Results are in chart; FIT Hemocult performed: negative 10/13/20     Hepatitis C Medicare Screening: was last done 3/2018       PAP SMEAR: was last done 2018 - Dr. Erwin Baig with normal results         Surgical History:               Family History:     Father:  at age 46; Cause of death was MI     Mother:  at age 90's;  Dementia;  RLS     Brother(s): Coronary Artery Disease     Sister(s): Hypertension     Son(s): 1 son(s) total;  Hypertension         Social History:     Occupation: Homemaker     Marital Status:      Children: 1 child          Review of Systems:  Review of Systems   Constitutional: Negative for fever.   Respiratory: Negative for shortness of breath.    Cardiovascular: Negative for chest pain.   Neurological: Positive for dizziness.          Current Outpatient Medications:   •  Calcium 150 MG tablet, Take  by mouth Daily., Disp: , Rfl:   •  Cholecalciferol (VITAMIN D3) 1000 units capsule, Take  by mouth., Disp: , Rfl:   •  EPINEPHrine (EPIPEN) 0.3 MG/0.3ML solution auto-injector injection, As Needed., Disp: , " "Rfl:   •  hydrOXYzine (ATARAX) 10 MG tablet, Take 1 tablet by mouth 2 (Two) Times a Day As Needed for Anxiety., Disp: 60 tablet, Rfl: 2  •  lisinopril (PRINIVIL,ZESTRIL) 10 MG tablet, TAKE 1 TABLET BY MOUTH ONCE DAILY, Disp: 90 tablet, Rfl: 1  •  loratadine (CLARITIN) 10 MG tablet, Take 1 tablet by mouth As Needed., Disp: , Rfl:   •  montelukast (SINGULAIR) 10 MG tablet, Take 1 tablet by mouth As Needed., Disp: , Rfl:   •  Phenylephrine HCl (SINEX REGULAR NA), into the nostril(s) as directed by provider., Disp: , Rfl:   •  amoxicillin-clavulanate (Augmentin) 875-125 MG per tablet, Take 1 tablet by mouth 2 (Two) Times a Day., Disp: 20 tablet, Rfl: 0  •  azelastine (ASTELIN) 0.1 % nasal spray, 2 sprays into the nostril(s) as directed by provider Daily As Needed for Rhinitis or Allergies. Use in each nostril as directed (Patient not taking: Reported on 4/18/2023), Disp: 30 mL, Rfl: 0    Vital Signs:   Vitals:    04/18/23 1135 04/18/23 1146 04/18/23 1158   BP: (!) 169/126  Comment: been taking sudafed and mucinex 149/49 149/55   BP Location: Right arm Right arm Right arm   Patient Position: Sitting Sitting Sitting   Cuff Size: Large Adult Large Adult Large Adult   Pulse: 77 78 76   Resp: 18     Temp: 97.6 °F (36.4 °C)     TempSrc: Oral     SpO2: 97%  Comment: room air     Weight: 106 kg (232 lb 9.6 oz)     Height: 165.1 cm (65\")     PainSc:   5     PainLoc: Head           Physical Exam:  Physical Exam  Constitutional:       General: She is not in acute distress.     Appearance: Normal appearance.   HENT:      Right Ear: Tympanic membrane, ear canal and external ear normal.      Left Ear: Tympanic membrane, ear canal and external ear normal.      Nose: Congestion (tender to bilateral max and frontal sinus ) present.      Mouth/Throat:      Pharynx: Oropharynx is clear. No posterior oropharyngeal erythema.   Cardiovascular:      Rate and Rhythm: Normal rate and regular rhythm.      Heart sounds: No murmur " heard.  Pulmonary:      Effort: Pulmonary effort is normal.      Breath sounds: Normal breath sounds.   Lymphadenopathy:      Cervical: No cervical adenopathy.   Neurological:      Mental Status: She is alert.   Psychiatric:         Mood and Affect: Mood normal.         Behavior: Behavior normal.         Result Review      The following data was reviewed by: WHITNEY Edmonds on 04/18/2023:    Results for orders placed or performed in visit on 04/18/23   POCT SARS-CoV-2 Antigen KAIA + Flu    Specimen: Swab   Result Value Ref Range    SARS Antigen Not Detected Not Detected, Presumptive Negative    Influenza A Antigen KAIA Not Detected Not Detected    Influenza B Antigen KAIA Not Detected Not Detected    Internal Control Passed Passed    Lot Number [id number]     Expiration Date 12/6/23                Assessment and Plan:          Diagnoses and all orders for this visit:    1. Acute upper respiratory infection, unspecified (Primary)  Assessment & Plan:  Rest, increase fluids, follow up if symptoms progress or change   covid screen:neg  Flu screen: neg      Orders:  -     POCT SARS-CoV-2 Antigen KAIA + Flu    2. Essential hypertension  Assessment & Plan:  Avoid decongestants and monitor her bp       3. Acute maxillary sinusitis, recurrence not specified  Assessment & Plan:  Cover her with Augmentin, to use plain mucinex if needed     Orders:  -     amoxicillin-clavulanate (Augmentin) 875-125 MG per tablet; Take 1 tablet by mouth 2 (Two) Times a Day.  Dispense: 20 tablet; Refill: 0        Follow Up   Return if symptoms worsen or fail to improve.  Patient was given instructions and counseling regarding her condition or for health maintenance advice. Please see specific information pulled into the AVS if appropriate.

## 2023-04-18 NOTE — ASSESSMENT & PLAN NOTE
Rest, increase fluids, follow up if symptoms progress or change   covid screen:neg  Flu screen: neg

## 2023-04-19 PROBLEM — J06.9 ACUTE UPPER RESPIRATORY INFECTION, UNSPECIFIED: Status: RESOLVED | Noted: 2023-04-18 | Resolved: 2023-04-19

## 2023-04-19 PROBLEM — J01.00 ACUTE MAXILLARY SINUSITIS: Status: RESOLVED | Noted: 2023-04-18 | Resolved: 2023-04-19

## 2023-04-24 ENCOUNTER — LAB (OUTPATIENT)
Dept: LAB | Facility: HOSPITAL | Age: 68
End: 2023-04-24
Payer: MEDICARE

## 2023-04-24 DIAGNOSIS — I10 ESSENTIAL HYPERTENSION: ICD-10-CM

## 2023-04-24 LAB
ALBUMIN SERPL-MCNC: 4.4 G/DL (ref 3.5–5.2)
ALBUMIN/GLOB SERPL: 1.7 G/DL
ALP SERPL-CCNC: 77 U/L (ref 39–117)
ALT SERPL W P-5'-P-CCNC: 15 U/L (ref 1–33)
ANION GAP SERPL CALCULATED.3IONS-SCNC: 9 MMOL/L (ref 5–15)
AST SERPL-CCNC: 18 U/L (ref 1–32)
BASOPHILS # BLD AUTO: 0.03 10*3/MM3 (ref 0–0.2)
BASOPHILS NFR BLD AUTO: 0.6 % (ref 0–1.5)
BILIRUB SERPL-MCNC: 0.3 MG/DL (ref 0–1.2)
BUN SERPL-MCNC: 16 MG/DL (ref 8–23)
BUN/CREAT SERPL: 22.5 (ref 7–25)
CALCIUM SPEC-SCNC: 9.7 MG/DL (ref 8.6–10.5)
CHLORIDE SERPL-SCNC: 106 MMOL/L (ref 98–107)
CHOLEST SERPL-MCNC: 188 MG/DL (ref 0–200)
CO2 SERPL-SCNC: 27 MMOL/L (ref 22–29)
CREAT SERPL-MCNC: 0.71 MG/DL (ref 0.57–1)
DEPRECATED RDW RBC AUTO: 43 FL (ref 37–54)
EGFRCR SERPLBLD CKD-EPI 2021: 92.2 ML/MIN/1.73
EOSINOPHIL # BLD AUTO: 0.1 10*3/MM3 (ref 0–0.4)
EOSINOPHIL NFR BLD AUTO: 2 % (ref 0.3–6.2)
ERYTHROCYTE [DISTWIDTH] IN BLOOD BY AUTOMATED COUNT: 13 % (ref 12.3–15.4)
GLOBULIN UR ELPH-MCNC: 2.6 GM/DL
GLUCOSE SERPL-MCNC: 98 MG/DL (ref 65–99)
HCT VFR BLD AUTO: 40.3 % (ref 34–46.6)
HDLC SERPL-MCNC: 65 MG/DL (ref 40–60)
HGB BLD-MCNC: 13.4 G/DL (ref 12–15.9)
IMM GRANULOCYTES # BLD AUTO: 0.01 10*3/MM3 (ref 0–0.05)
IMM GRANULOCYTES NFR BLD AUTO: 0.2 % (ref 0–0.5)
LDLC SERPL CALC-MCNC: 103 MG/DL (ref 0–100)
LDLC/HDLC SERPL: 1.55 {RATIO}
LYMPHOCYTES # BLD AUTO: 1.44 10*3/MM3 (ref 0.7–3.1)
LYMPHOCYTES NFR BLD AUTO: 29.1 % (ref 19.6–45.3)
MCH RBC QN AUTO: 29.7 PG (ref 26.6–33)
MCHC RBC AUTO-ENTMCNC: 33.3 G/DL (ref 31.5–35.7)
MCV RBC AUTO: 89.4 FL (ref 79–97)
MONOCYTES # BLD AUTO: 0.55 10*3/MM3 (ref 0.1–0.9)
MONOCYTES NFR BLD AUTO: 11.1 % (ref 5–12)
NEUTROPHILS NFR BLD AUTO: 2.82 10*3/MM3 (ref 1.7–7)
NEUTROPHILS NFR BLD AUTO: 57 % (ref 42.7–76)
PLATELET # BLD AUTO: 216 10*3/MM3 (ref 140–450)
PMV BLD AUTO: 10 FL (ref 6–12)
POTASSIUM SERPL-SCNC: 4.2 MMOL/L (ref 3.5–5.2)
PROT SERPL-MCNC: 7 G/DL (ref 6–8.5)
RBC # BLD AUTO: 4.51 10*6/MM3 (ref 3.77–5.28)
SODIUM SERPL-SCNC: 142 MMOL/L (ref 136–145)
TRIGL SERPL-MCNC: 111 MG/DL (ref 0–150)
VLDLC SERPL-MCNC: 20 MG/DL (ref 5–40)
WBC NRBC COR # BLD: 4.95 10*3/MM3 (ref 3.4–10.8)

## 2023-04-24 PROCEDURE — 80053 COMPREHEN METABOLIC PANEL: CPT

## 2023-04-24 PROCEDURE — 80061 LIPID PANEL: CPT

## 2023-04-24 PROCEDURE — 85025 COMPLETE CBC W/AUTO DIFF WBC: CPT

## 2023-04-24 PROCEDURE — 36415 COLL VENOUS BLD VENIPUNCTURE: CPT

## 2023-04-25 ENCOUNTER — OFFICE VISIT (OUTPATIENT)
Dept: FAMILY MEDICINE CLINIC | Age: 68
End: 2023-04-25
Payer: MEDICARE

## 2023-04-25 VITALS
BODY MASS INDEX: 38.49 KG/M2 | HEART RATE: 80 BPM | HEIGHT: 65 IN | SYSTOLIC BLOOD PRESSURE: 139 MMHG | DIASTOLIC BLOOD PRESSURE: 77 MMHG | OXYGEN SATURATION: 95 % | WEIGHT: 231 LBS

## 2023-04-25 DIAGNOSIS — E66.01 MORBID (SEVERE) OBESITY DUE TO EXCESS CALORIES: ICD-10-CM

## 2023-04-25 DIAGNOSIS — J30.1 NON-SEASONAL ALLERGIC RHINITIS DUE TO POLLEN: ICD-10-CM

## 2023-04-25 DIAGNOSIS — E55.9 VITAMIN D DEFICIENCY: ICD-10-CM

## 2023-04-25 DIAGNOSIS — F41.8 OTHER SPECIFIED ANXIETY DISORDERS: ICD-10-CM

## 2023-04-25 DIAGNOSIS — I10 ESSENTIAL HYPERTENSION: Primary | ICD-10-CM

## 2023-04-25 PROBLEM — R22.1 MASS OF RIGHT SIDE OF NECK: Status: RESOLVED | Noted: 2022-10-26 | Resolved: 2023-04-25

## 2023-04-25 PROBLEM — Z12.11 COLON CANCER SCREENING: Status: RESOLVED | Noted: 2022-06-08 | Resolved: 2023-04-25

## 2023-04-25 PROBLEM — Z00.00 ANNUAL PHYSICAL EXAM: Status: RESOLVED | Noted: 2021-10-20 | Resolved: 2023-04-25

## 2023-04-25 RX ORDER — HYDROXYZINE HYDROCHLORIDE 10 MG/1
10 TABLET, FILM COATED ORAL 2 TIMES DAILY PRN
Qty: 60 TABLET | Refills: 2 | Status: SHIPPED | OUTPATIENT
Start: 2023-04-25

## 2023-04-25 RX ORDER — LISINOPRIL 10 MG/1
10 TABLET ORAL DAILY
Qty: 90 TABLET | Refills: 1 | Status: SHIPPED | OUTPATIENT
Start: 2023-04-25

## 2023-04-25 NOTE — ASSESSMENT & PLAN NOTE
Patient's (Body mass index is 38.44 kg/m².) indicates that they are morbidly/severely obese (BMI > 40 or > 35 with obesity - related health condition) with health conditions that include hypertension . Weight is unchanged. BMI  is above average; BMI management plan is completed. We discussed portion control and increasing exercise.

## 2023-04-25 NOTE — ASSESSMENT & PLAN NOTE
Blood pressure at goal.  Stable on lisinopril 10 mg daily.  Refills sent.  Labs which were checked last week were reviewed with Bethanie and all questions answered.

## 2023-04-25 NOTE — PROGRESS NOTES
Chief Complaint  Anxiety (6 month follow up) and Hypertension (6 month follow up)    Subjective          Bethanie Forte presents to [facility] today for f/u of routine problems.    She is on lisinopril for HTN.  Her BP has been well controlled.  No chest pain, palpitations, and shortness of breath.    She is on hydroxyzine prn for anxiety.  Sx are well controlled.  She just takes it as needed, but it seems to work pretty well.      She is on loratadine and montelukast for chronic allergies.  Has been following with Dr. Aranda Allergy. She did come in last week for allergy symptoms and was diagnosed with sinusitis for which she was treated with Augmentin.        She is on vitamin D supplement (as well as Jose A/vit D) for vit D deficiency.      Current Outpatient Medications:   •  amoxicillin-clavulanate (Augmentin) 875-125 MG per tablet, Take 1 tablet by mouth 2 (Two) Times a Day., Disp: 20 tablet, Rfl: 0  •  azelastine (ASTELIN) 0.1 % nasal spray, 2 sprays into the nostril(s) as directed by provider Daily As Needed for Rhinitis or Allergies. Use in each nostril as directed, Disp: 30 mL, Rfl: 0  •  Calcium 150 MG tablet, Take  by mouth Daily., Disp: , Rfl:   •  Cholecalciferol (VITAMIN D3) 1000 units capsule, Take  by mouth., Disp: , Rfl:   •  EPINEPHrine (EPIPEN) 0.3 MG/0.3ML solution auto-injector injection, As Needed., Disp: , Rfl:   •  hydrOXYzine (ATARAX) 10 MG tablet, Take 1 tablet by mouth 2 (Two) Times a Day As Needed for Anxiety., Disp: 60 tablet, Rfl: 2  •  lisinopril (PRINIVIL,ZESTRIL) 10 MG tablet, Take 1 tablet by mouth Daily., Disp: 90 tablet, Rfl: 1  •  loratadine (CLARITIN) 10 MG tablet, Take 1 tablet by mouth As Needed., Disp: , Rfl:   •  montelukast (SINGULAIR) 10 MG tablet, Take 1 tablet by mouth As Needed., Disp: , Rfl:   •  Phenylephrine HCl (SINEX REGULAR NA), into the nostril(s) as directed by provider As Needed., Disp: , Rfl:     Allergies:  Patient has no  "known allergies.      Objective   Vital Signs:   Vitals:    04/25/23 1531   BP: 139/77   BP Location: Right arm   Patient Position: Sitting   Cuff Size: Adult   Pulse: 80   SpO2: 95%   Weight: 105 kg (231 lb)   Height: 165.1 cm (65\")       Physical Exam  Vitals reviewed.   Constitutional:       General: She is not in acute distress.     Appearance: Normal appearance. She is well-developed.   HENT:      Head: Normocephalic and atraumatic.      Right Ear: External ear normal.      Left Ear: External ear normal.   Eyes:      Extraocular Movements: Extraocular movements intact.      Conjunctiva/sclera: Conjunctivae normal.      Pupils: Pupils are equal, round, and reactive to light.   Cardiovascular:      Rate and Rhythm: Normal rate and regular rhythm.      Heart sounds: No murmur heard.  Pulmonary:      Effort: Pulmonary effort is normal.      Breath sounds: Normal breath sounds. No wheezing, rhonchi or rales.   Abdominal:      General: Bowel sounds are normal. There is no distension.      Palpations: Abdomen is soft.      Tenderness: There is no abdominal tenderness.   Musculoskeletal:         General: Normal range of motion.   Neurological:      Mental Status: She is alert.   Psychiatric:         Mood and Affect: Affect normal.        Lab Results   Component Value Date    GLUCOSE 98 04/24/2023    BUN 16 04/24/2023    CREATININE 0.71 04/24/2023    EGFRIFNONA 85 02/08/2022    EGFRIFAFRI 90 03/27/2018    BCR 22.5 04/24/2023    K 4.2 04/24/2023    CO2 27.0 04/24/2023    CALCIUM 9.7 04/24/2023    ALBUMIN 4.4 04/24/2023    LABIL2 1.7 04/20/2021    AST 18 04/24/2023    ALT 15 04/24/2023          Assessment and Plan    Diagnoses and all orders for this visit:    1. Essential hypertension (Primary)  Assessment & Plan:  Blood pressure at goal.  Stable on lisinopril 10 mg daily.  Refills sent.  Labs which were checked last week were reviewed with Bethanie and all questions answered.    Orders:  -     lisinopril " (PRINIVIL,ZESTRIL) 10 MG tablet; Take 1 tablet by mouth Daily.  Dispense: 90 tablet; Refill: 1    2. Non-seasonal allergic rhinitis due to pollen    3. Other specified anxiety disorders  Assessment & Plan:  Doing great on the hydroxyzine 10 mg twice daily as needed.  Refills sent today.  It seems to really work well for her.  Continue to monitor.    Orders:  -     hydrOXYzine (ATARAX) 10 MG tablet; Take 1 tablet by mouth 2 (Two) Times a Day As Needed for Anxiety.  Dispense: 60 tablet; Refill: 2    4. Vitamin D deficiency  Assessment & Plan:  Labs are reviewed and up-to-date.  These were ordered and received last week.      5. Morbid (severe) obesity due to excess calories  Assessment & Plan:  Patient's (Body mass index is 38.44 kg/m².) indicates that they are morbidly/severely obese (BMI > 40 or > 35 with obesity - related health condition) with health conditions that include hypertension . Weight is unchanged. BMI  is above average; BMI management plan is completed. We discussed portion control and increasing exercise.         Follow Up   Return in about 6 months (around 10/25/2023) for Medicare Wellness.  Patient was given instructions and counseling regarding her condition or for health maintenance advice. Please see specific information pulled into the AVS if appropriate.

## 2023-04-25 NOTE — ASSESSMENT & PLAN NOTE
Doing great on the hydroxyzine 10 mg twice daily as needed.  Refills sent today.  It seems to really work well for her.  Continue to monitor.

## 2023-05-18 ENCOUNTER — LAB (OUTPATIENT)
Dept: LAB | Facility: HOSPITAL | Age: 68
End: 2023-05-18
Payer: MEDICARE

## 2023-05-18 ENCOUNTER — OFFICE VISIT (OUTPATIENT)
Dept: FAMILY MEDICINE CLINIC | Age: 68
End: 2023-05-18

## 2023-05-18 ENCOUNTER — PATIENT MESSAGE (OUTPATIENT)
Dept: FAMILY MEDICINE CLINIC | Age: 68
End: 2023-05-18
Payer: MEDICARE

## 2023-05-18 ENCOUNTER — TELEPHONE (OUTPATIENT)
Dept: FAMILY MEDICINE CLINIC | Age: 68
End: 2023-05-18
Payer: MEDICARE

## 2023-05-18 ENCOUNTER — TELEPHONE (OUTPATIENT)
Dept: FAMILY MEDICINE CLINIC | Age: 68
End: 2023-05-18

## 2023-05-18 ENCOUNTER — TELEPHONE (OUTPATIENT)
Dept: GASTROENTEROLOGY | Facility: CLINIC | Age: 68
End: 2023-05-18

## 2023-05-18 VITALS
SYSTOLIC BLOOD PRESSURE: 146 MMHG | WEIGHT: 230.2 LBS | BODY MASS INDEX: 38.35 KG/M2 | HEART RATE: 82 BPM | OXYGEN SATURATION: 98 % | DIASTOLIC BLOOD PRESSURE: 65 MMHG | HEIGHT: 65 IN

## 2023-05-18 DIAGNOSIS — R10.13 EPIGASTRIC PAIN: Primary | ICD-10-CM

## 2023-05-18 DIAGNOSIS — R10.13 EPIGASTRIC PAIN: ICD-10-CM

## 2023-05-18 LAB — UREA BREATH TEST QL: NEGATIVE

## 2023-05-18 PROCEDURE — 83013 H PYLORI (C-13) BREATH: CPT

## 2023-05-18 PROCEDURE — 1159F MED LIST DOCD IN RCRD: CPT | Performed by: NURSE PRACTITIONER

## 2023-05-18 PROCEDURE — 99213 OFFICE O/P EST LOW 20 MIN: CPT | Performed by: NURSE PRACTITIONER

## 2023-05-18 PROCEDURE — 1160F RVW MEDS BY RX/DR IN RCRD: CPT | Performed by: NURSE PRACTITIONER

## 2023-05-18 PROCEDURE — 3078F DIAST BP <80 MM HG: CPT | Performed by: NURSE PRACTITIONER

## 2023-05-18 PROCEDURE — 3077F SYST BP >= 140 MM HG: CPT | Performed by: NURSE PRACTITIONER

## 2023-05-18 RX ORDER — SUCRALFATE 1 G/1
1 TABLET ORAL 4 TIMES DAILY
Qty: 40 TABLET | Refills: 1 | Status: SHIPPED | OUTPATIENT
Start: 2023-05-18

## 2023-05-18 NOTE — PROGRESS NOTES
"Chief Complaint  Abdominal Pain (Pt states that she has tried otc methods and no relief; sx started may 10th )    Subjective        Bethanie Forte presents to [facility]  Abdominal Pain  This is a recurrent problem. The current episode started in the past 7 days. The problem occurs intermittently. The problem has been waxing and waning. The pain is located in the epigastric region. Pertinent negatives include no constipation, diarrhea, nausea or vomiting. The pain is aggravated by eating. The pain is relieved by nothing. She has tried nothing for the symptoms. The treatment provided no relief.   Symptoms began after eating a hamburger. She reports feeling better when she was eating healthy and thinks the grease irritated her stomach and she wants to get back to eating chicken and vegetables when symptoms were improved.     Objective   Vital Signs:  /65 (BP Location: Left arm, Patient Position: Sitting, Cuff Size: Adult)   Pulse 82   Ht 165.1 cm (65\")   Wt 104 kg (230 lb 3.2 oz)   SpO2 98% Comment: room air  BMI 38.31 kg/m²   Estimated body mass index is 38.31 kg/m² as calculated from the following:    Height as of this encounter: 165.1 cm (65\").    Weight as of this encounter: 104 kg (230 lb 3.2 oz).             Physical Exam  HENT:      Head: Normocephalic.   Cardiovascular:      Rate and Rhythm: Normal rate and regular rhythm.   Pulmonary:      Effort: Pulmonary effort is normal.      Breath sounds: Normal breath sounds.   Abdominal:      General: Bowel sounds are normal.      Palpations: Abdomen is soft.      Tenderness: There is no abdominal tenderness. There is no guarding.   Skin:     General: Skin is warm and dry.   Neurological:      Mental Status: She is alert and oriented to person, place, and time.   Psychiatric:         Mood and Affect: Mood normal.        Result Review :                   Assessment and Plan   Diagnoses and all orders for this visit:    1. " Epigastric pain (Primary)  Comments:  [place] diet to regular avoiding greasy, fatty and spicey foods. F/U with GI if symptoms persist.   Orders:  -     sucralfate (Carafate) 1 g tablet; Take 1 tablet by mouth 4 (Four) Times a Day.  Dispense: 40 tablet; Refill: 1             Follow Up   Return if symptoms worsen or fail to improve.  Patient was given instructions and counseling regarding her condition or for health maintenance advice. Please see specific information pulled into the AVS if appropriate.

## 2023-05-18 NOTE — TELEPHONE ENCOUNTER
From: Bethanie Forte  To: Alivia Montano  Sent: 5/18/2023 9:13 AM EDT  Subject: My stomach    About on May 10, 2023 my stomach started bothering me again, I was wondering if you could put an order in for me today for the H-pylori test ?

## 2023-05-18 NOTE — TELEPHONE ENCOUNTER
Caller: Bethanie Forte A    Relationship: Self    Best call back number: [phone]    What is the best time to reach you: ANYTIME    Who are you requesting to speak with (clinical staff, provider,  specific staff member): ULYSSES    Do you know the name of the person who called: PATIENT    What was the call regarding: PATIENT WOULD LIKE TO DISCUSS A CLINICAL MATTER WITH KASSIE. PLEASE CALL PATIENT BACK AT YOUR EARLIEST CONVENIENCE AND ADVISE.    Do you require a callback: YES

## 2023-05-18 NOTE — TELEPHONE ENCOUNTER
Caller: Bethanie Forte A    Relationship: Self    Best call back number: [phone]    Who is your current provider: DR. MARIO PICHARDO    Who would you like your new provider to be: DR. CALDWELL    What are your reasons for transferring care: PATIENT NEVER SAW DR. PICHARDO.  SHE SAW WHITNEY LEMA WHO IS NO LONGER  THERE. SHE PREFERS TO SEE DR. CALDWELL AND WOULD LIKE TO SCHEDULE AN APPOINTMENT WITH HIM.    Additional notes: PLEASE CALL BACK ASAP.  LEAVE VM IF PATIENT DOESN'T ANSWER.

## 2023-06-14 ENCOUNTER — PATIENT MESSAGE (OUTPATIENT)
Dept: FAMILY MEDICINE CLINIC | Age: 68
End: 2023-06-14
Payer: MEDICARE

## 2023-06-14 DIAGNOSIS — R10.13 EPIGASTRIC PAIN: ICD-10-CM

## 2023-06-14 RX ORDER — SUCRALFATE 1 G/1
1 TABLET ORAL 4 TIMES DAILY
Qty: 40 TABLET | Refills: 1 | Status: SHIPPED | OUTPATIENT
Start: 2023-06-14

## 2023-06-14 NOTE — TELEPHONE ENCOUNTER
From: Bethanie Forte  To: Ade Roberts  Sent: 6/14/2023 9:57 AM EDT  Subject: My Sucralfate 1gm tablet    Ade, I'm in the process of getting and endoscopy and a Colonoscopy set up and I was wondering if you could refill the sucralfate tablets again, I have about 8 left and they are saying it will be appx. 2 weeks before  I can get scoped.  thank you  Bethanie forte

## 2023-09-25 ENCOUNTER — OFFICE VISIT (OUTPATIENT)
Dept: FAMILY MEDICINE CLINIC | Age: 68
End: 2023-09-25

## 2023-09-25 VITALS
BODY MASS INDEX: 37.39 KG/M2 | HEIGHT: 65 IN | HEART RATE: 73 BPM | OXYGEN SATURATION: 98 % | SYSTOLIC BLOOD PRESSURE: 141 MMHG | WEIGHT: 224.4 LBS | DIASTOLIC BLOOD PRESSURE: 58 MMHG

## 2023-09-25 DIAGNOSIS — K21.9 GASTROESOPHAGEAL REFLUX DISEASE WITHOUT ESOPHAGITIS: ICD-10-CM

## 2023-09-25 DIAGNOSIS — R82.998 LEUKOCYTES IN URINE: ICD-10-CM

## 2023-09-25 DIAGNOSIS — M54.50 ACUTE LEFT-SIDED LOW BACK PAIN, UNSPECIFIED WHETHER SCIATICA PRESENT: Primary | ICD-10-CM

## 2023-09-25 DIAGNOSIS — N30.00 ACUTE CYSTITIS WITHOUT HEMATURIA: ICD-10-CM

## 2023-09-25 LAB
BILIRUB BLD-MCNC: NEGATIVE MG/DL
CLARITY, POC: CLEAR
COLOR UR: YELLOW
EXPIRATION DATE: ABNORMAL
GLUCOSE UR STRIP-MCNC: NEGATIVE MG/DL
KETONES UR QL: NEGATIVE
LEUKOCYTE EST, POC: ABNORMAL
Lab: ABNORMAL
NITRITE UR-MCNC: NEGATIVE MG/ML
PH UR: 6 [PH] (ref 5–8)
PROT UR STRIP-MCNC: NEGATIVE MG/DL
RBC # UR STRIP: NEGATIVE /UL
SP GR UR: 1.02 (ref 1–1.03)
UROBILINOGEN UR QL: NORMAL

## 2023-09-25 PROCEDURE — 3078F DIAST BP <80 MM HG: CPT | Performed by: NURSE PRACTITIONER

## 2023-09-25 PROCEDURE — 3077F SYST BP >= 140 MM HG: CPT | Performed by: NURSE PRACTITIONER

## 2023-09-25 PROCEDURE — 99214 OFFICE O/P EST MOD 30 MIN: CPT | Performed by: NURSE PRACTITIONER

## 2023-09-25 PROCEDURE — 81003 URINALYSIS AUTO W/O SCOPE: CPT | Performed by: NURSE PRACTITIONER

## 2023-09-25 PROCEDURE — 1159F MED LIST DOCD IN RCRD: CPT | Performed by: NURSE PRACTITIONER

## 2023-09-25 PROCEDURE — 87086 URINE CULTURE/COLONY COUNT: CPT | Performed by: NURSE PRACTITIONER

## 2023-09-25 PROCEDURE — 1160F RVW MEDS BY RX/DR IN RCRD: CPT | Performed by: NURSE PRACTITIONER

## 2023-09-25 RX ORDER — ONDANSETRON 4 MG/1
TABLET, FILM COATED ORAL
COMMUNITY
Start: 2023-06-12

## 2023-09-25 RX ORDER — PANTOPRAZOLE SODIUM 40 MG/1
40 TABLET, DELAYED RELEASE ORAL DAILY
COMMUNITY
Start: 2023-06-28 | End: 2023-09-25 | Stop reason: SDUPTHER

## 2023-09-25 RX ORDER — FLUTICASONE PROPIONATE 50 MCG
2 SPRAY, SUSPENSION (ML) NASAL DAILY
COMMUNITY
Start: 2023-09-07

## 2023-09-25 RX ORDER — NITROFURANTOIN 25; 75 MG/1; MG/1
100 CAPSULE ORAL 2 TIMES DAILY
Qty: 10 CAPSULE | Refills: 0 | Status: SHIPPED | OUTPATIENT
Start: 2023-09-25 | End: 2023-09-30

## 2023-09-25 RX ORDER — HYDROXYZINE PAMOATE 50 MG/1
10 CAPSULE ORAL
COMMUNITY

## 2023-09-25 RX ORDER — TRIAMCINOLONE ACETONIDE 1 MG/G
CREAM TOPICAL
COMMUNITY
Start: 2023-06-15

## 2023-09-25 RX ORDER — PANTOPRAZOLE SODIUM 40 MG/1
40 TABLET, DELAYED RELEASE ORAL DAILY
Qty: 30 TABLET | Refills: 0 | Status: SHIPPED | OUTPATIENT
Start: 2023-09-25

## 2023-09-25 NOTE — PROGRESS NOTES
"Chief Complaint  Back Pain (Sx started last night )    Subjective        Bethanie Forte presents to [facility]  Back Pain  This is a new problem. The current episode started today. The problem occurs constantly. The problem is unchanged. The pain is present in the lumbar spine. The quality of the pain is described as aching. The pain does not radiate. The symptoms are aggravated by lying down. Associated symptoms include dysuria. Treatments tried: Tylenol.     Objective   Vital Signs:  /58 (BP Location: Right arm, Patient Position: Sitting, Cuff Size: Adult)   Pulse 73   Ht 165.1 cm (65\")   Wt 102 kg (224 lb 6.4 oz)   SpO2 98% Comment: room air  BMI 37.34 kg/m²   Estimated body mass index is 37.34 kg/m² as calculated from the following:    Height as of this encounter: 165.1 cm (65\").    Weight as of this encounter: 102 kg (224 lb 6.4 oz).               Physical Exam  Cardiovascular:      Rate and Rhythm: Normal rate.   Pulmonary:      Effort: Pulmonary effort is normal.   Skin:     General: Skin is warm and dry.   Neurological:      Mental Status: She is alert and oriented to person, place, and time.   Psychiatric:         Mood and Affect: Mood normal.      Result Review :          Results for orders placed or performed in visit on 09/25/23   POCT urinalysis dipstick, automated    Specimen: Urine   Result Value Ref Range    Color Yellow Yellow, Straw, Dark Yellow, Maren    Clarity, UA Clear Clear    Specific Gravity  1.025 1.005 - 1.030    pH, Urine 6.0 5.0 - 8.0    Leukocytes Small (1+) (A) Negative    Nitrite, UA Negative Negative    Protein, POC Negative Negative mg/dL    Glucose, UA Negative Negative mg/dL    Ketones, UA Negative Negative    Urobilinogen, UA Normal Normal, 0.2 E.U./dL    Bilirubin Negative Negative    Blood, UA Negative Negative    Lot Number [id number]     Expiration Date 04-30-24               Assessment and Plan   Diagnoses and all orders for this " visit:    1. Acute left-sided low back pain, unspecified whether sciatica present (Primary)  -     POCT urinalysis dipstick, automated  -     Urine Culture - Urine, Urine, Clean Catch; Future  -     Urine Culture - Urine, Urine, Clean Catch    2. Leukocytes in urine  -     Urine Culture - Urine, Urine, Clean Catch; Future  -     Urine Culture - Urine, Urine, Clean Catch    3. Acute cystitis without hematuria  -     nitrofurantoin, macrocrystal-monohydrate, (Macrobid) 100 MG capsule; Take 1 capsule by mouth 2 (Two) Times a Day for 5 days.  Dispense: 10 capsule; Refill: 0    4. Gastroesophageal reflux disease without esophagitis  Comments:  Stable, refill provided  Orders:  -     pantoprazole (PROTONIX) 40 MG EC tablet; Take 1 tablet by mouth Daily.  Dispense: 30 tablet; Refill: 0             Follow Up   Return if symptoms worsen or fail to improve.  Patient was given instructions and counseling regarding her condition or for health maintenance advice. Please see specific information pulled into the AVS if appropriate.

## 2023-09-26 LAB — BACTERIA SPEC AEROBE CULT: NO GROWTH

## 2023-09-28 DIAGNOSIS — Z78.0 POST-MENOPAUSAL: Primary | ICD-10-CM

## 2023-10-19 ENCOUNTER — TELEPHONE (OUTPATIENT)
Dept: FAMILY MEDICINE CLINIC | Age: 68
End: 2023-10-19
Payer: MEDICARE

## 2023-10-19 DIAGNOSIS — E55.9 VITAMIN D DEFICIENCY: ICD-10-CM

## 2023-10-19 DIAGNOSIS — I10 ESSENTIAL HYPERTENSION: Primary | ICD-10-CM

## 2023-10-26 ENCOUNTER — LAB (OUTPATIENT)
Dept: LAB | Facility: HOSPITAL | Age: 68
End: 2023-10-26
Payer: MEDICARE

## 2023-10-26 DIAGNOSIS — E55.9 VITAMIN D DEFICIENCY: ICD-10-CM

## 2023-10-26 DIAGNOSIS — I10 ESSENTIAL HYPERTENSION: ICD-10-CM

## 2023-10-26 LAB
25(OH)D3 SERPL-MCNC: 49.7 NG/ML (ref 30–100)
ALBUMIN SERPL-MCNC: 4.3 G/DL (ref 3.5–5.2)
ALBUMIN/GLOB SERPL: 1.6 G/DL
ALP SERPL-CCNC: 72 U/L (ref 39–117)
ALT SERPL W P-5'-P-CCNC: 19 U/L (ref 1–33)
ANION GAP SERPL CALCULATED.3IONS-SCNC: 9.8 MMOL/L (ref 5–15)
AST SERPL-CCNC: 18 U/L (ref 1–32)
BASOPHILS # BLD AUTO: 0.02 10*3/MM3 (ref 0–0.2)
BASOPHILS NFR BLD AUTO: 0.4 % (ref 0–1.5)
BILIRUB SERPL-MCNC: 0.3 MG/DL (ref 0–1.2)
BUN SERPL-MCNC: 15 MG/DL (ref 8–23)
BUN/CREAT SERPL: 20 (ref 7–25)
CALCIUM SPEC-SCNC: 10 MG/DL (ref 8.6–10.5)
CHLORIDE SERPL-SCNC: 106 MMOL/L (ref 98–107)
CHOLEST SERPL-MCNC: 165 MG/DL (ref 0–200)
CO2 SERPL-SCNC: 27.2 MMOL/L (ref 22–29)
CREAT SERPL-MCNC: 0.75 MG/DL (ref 0.57–1)
DEPRECATED RDW RBC AUTO: 42.8 FL (ref 37–54)
EGFRCR SERPLBLD CKD-EPI 2021: 85.8 ML/MIN/1.73
EOSINOPHIL # BLD AUTO: 0.09 10*3/MM3 (ref 0–0.4)
EOSINOPHIL NFR BLD AUTO: 1.7 % (ref 0.3–6.2)
ERYTHROCYTE [DISTWIDTH] IN BLOOD BY AUTOMATED COUNT: 13 % (ref 12.3–15.4)
GLOBULIN UR ELPH-MCNC: 2.7 GM/DL
GLUCOSE SERPL-MCNC: 90 MG/DL (ref 65–99)
HCT VFR BLD AUTO: 40.1 % (ref 34–46.6)
HDLC SERPL-MCNC: 57 MG/DL (ref 40–60)
HGB BLD-MCNC: 13.3 G/DL (ref 12–15.9)
IMM GRANULOCYTES # BLD AUTO: 0.01 10*3/MM3 (ref 0–0.05)
IMM GRANULOCYTES NFR BLD AUTO: 0.2 % (ref 0–0.5)
LDLC SERPL CALC-MCNC: 93 MG/DL (ref 0–100)
LDLC/HDLC SERPL: 1.62 {RATIO}
LYMPHOCYTES # BLD AUTO: 1.4 10*3/MM3 (ref 0.7–3.1)
LYMPHOCYTES NFR BLD AUTO: 27 % (ref 19.6–45.3)
MCH RBC QN AUTO: 29.4 PG (ref 26.6–33)
MCHC RBC AUTO-ENTMCNC: 33.2 G/DL (ref 31.5–35.7)
MCV RBC AUTO: 88.5 FL (ref 79–97)
MONOCYTES # BLD AUTO: 0.54 10*3/MM3 (ref 0.1–0.9)
MONOCYTES NFR BLD AUTO: 10.4 % (ref 5–12)
NEUTROPHILS NFR BLD AUTO: 3.12 10*3/MM3 (ref 1.7–7)
NEUTROPHILS NFR BLD AUTO: 60.3 % (ref 42.7–76)
PLATELET # BLD AUTO: 239 10*3/MM3 (ref 140–450)
PMV BLD AUTO: 9.5 FL (ref 6–12)
POTASSIUM SERPL-SCNC: 4.2 MMOL/L (ref 3.5–5.2)
PROT SERPL-MCNC: 7 G/DL (ref 6–8.5)
RBC # BLD AUTO: 4.53 10*6/MM3 (ref 3.77–5.28)
SODIUM SERPL-SCNC: 143 MMOL/L (ref 136–145)
TRIGL SERPL-MCNC: 77 MG/DL (ref 0–150)
VLDLC SERPL-MCNC: 15 MG/DL (ref 5–40)
WBC NRBC COR # BLD: 5.18 10*3/MM3 (ref 3.4–10.8)

## 2023-10-26 PROCEDURE — 36415 COLL VENOUS BLD VENIPUNCTURE: CPT

## 2023-10-26 PROCEDURE — 85025 COMPLETE CBC W/AUTO DIFF WBC: CPT

## 2023-10-26 PROCEDURE — 82306 VITAMIN D 25 HYDROXY: CPT

## 2023-10-26 PROCEDURE — 80061 LIPID PANEL: CPT

## 2023-10-26 PROCEDURE — 80053 COMPREHEN METABOLIC PANEL: CPT

## 2023-10-31 ENCOUNTER — OFFICE VISIT (OUTPATIENT)
Dept: FAMILY MEDICINE CLINIC | Age: 68
End: 2023-10-31
Payer: MEDICARE

## 2023-10-31 VITALS
OXYGEN SATURATION: 99 % | HEIGHT: 65 IN | HEART RATE: 71 BPM | DIASTOLIC BLOOD PRESSURE: 42 MMHG | WEIGHT: 226.6 LBS | BODY MASS INDEX: 37.75 KG/M2 | SYSTOLIC BLOOD PRESSURE: 136 MMHG

## 2023-10-31 DIAGNOSIS — K21.9 GASTROESOPHAGEAL REFLUX DISEASE WITHOUT ESOPHAGITIS: ICD-10-CM

## 2023-10-31 DIAGNOSIS — Z00.00 ANNUAL PHYSICAL EXAM: Primary | ICD-10-CM

## 2023-10-31 DIAGNOSIS — I10 ESSENTIAL HYPERTENSION: ICD-10-CM

## 2023-10-31 DIAGNOSIS — F41.1 GENERALIZED ANXIETY DISORDER: ICD-10-CM

## 2023-10-31 DIAGNOSIS — J30.1 NON-SEASONAL ALLERGIC RHINITIS DUE TO POLLEN: ICD-10-CM

## 2023-10-31 RX ORDER — HYDROXYZINE HYDROCHLORIDE 10 MG/1
10 TABLET, FILM COATED ORAL 2 TIMES DAILY PRN
Qty: 60 TABLET | Refills: 2 | Status: SHIPPED | OUTPATIENT
Start: 2023-10-31 | End: 2023-10-31 | Stop reason: SDUPTHER

## 2023-10-31 RX ORDER — HYDROXYZINE HYDROCHLORIDE 10 MG/1
10 TABLET, FILM COATED ORAL 2 TIMES DAILY PRN
Qty: 60 TABLET | Refills: 2 | Status: SHIPPED | OUTPATIENT
Start: 2023-10-31

## 2023-10-31 RX ORDER — LISINOPRIL 10 MG/1
10 TABLET ORAL DAILY
Qty: 90 TABLET | Refills: 1 | Status: SHIPPED | OUTPATIENT
Start: 2023-10-31

## 2023-10-31 NOTE — ASSESSMENT & PLAN NOTE
She is UTD on colonoscopy, last done 6/2023 and this showed polyp x1.  Ten-year repeat recommended.  Pap smear is no longer indicated by age and history.  She is up-to-date on mammogram, last done 2/2023 and this was normal.  She is reportedly UTD on DEXA, done at [facility] in [facility]; scheduled for repeat 11/14/2023.  She is UTD on Pneumovax (10/2021), Prevnar-13 (10/2020) and Zostavax (11/2015).  She is due for COVID, Shingrix, Td and flu shot.  Flu and COVID declined today.  Shingrix and Tdap can be done at the pharmacy.    She is UTD on routine labs.

## 2023-10-31 NOTE — PROGRESS NOTES
The ABCs of the Annual Wellness Visit  Subsequent Medicare Wellness Visit    Subjective    Bethanie Forte is a 68 y.o. female who presents for a Subsequent Medicare Wellness Visit.    She is UTD on colonoscopy, last done 6/2023 and this showed polyp x1.  Ten-year repeat recommended.  Pap smear is no longer indicated by age and history.  She is up-to-date on mammogram, last done 2/2023 and this was normal.  She is reportedly UTD on DEXA, done at [facility] in [facility] [place]; scheduled for repeat 11/14/2023.  She is UTD on Pneumovax (10/2021), Prevnar-13 (10/2020) and Zostavax (11/2015).  She is due for COVID, Shingrix, Td and flu shot.  She is UTD on routine labs.      The following portions of the patient's history were reviewed and   updated as appropriate: allergies, current medications, past family history, past medical history, past social history, past surgical history, and problem list.    Compared to one year ago, the patient feels her physical   health is the same.    Compared to one year ago, the patient feels her mental   health is the same.    Recent Hospitalizations:  She was not admitted to the hospital during the last year.       Current Medical Providers:  Patient Care Team:  Alivia Montano MD as PCP - Bernabe Carvajal MD as Consulting Physician (Allergy and Immunology)    Outpatient Medications Prior to Visit   Medication Sig Dispense Refill    azelastine (ASTELIN) 0.1 % nasal spray 2 sprays into the nostril(s) as directed by provider Daily As Needed for Rhinitis or Allergies. Use in each nostril as directed 30 mL 0    Calcium 150 MG tablet Take  by mouth Daily.      Cholecalciferol (VITAMIN D3) 1000 units capsule Take  by mouth.      EPINEPHrine (EPIPEN) 0.3 MG/0.3ML solution auto-injector injection As Needed.      fluticasone (FLONASE) 50 MCG/ACT nasal spray 2 sprays by Each Nare route Daily. Shake well before using.      loratadine (CLARITIN) 10 MG tablet Take 1  tablet by mouth As Needed.      montelukast (SINGULAIR) 10 MG tablet Take 1 tablet by mouth As Needed.      pantoprazole (PROTONIX) 40 MG EC tablet Take 1 tablet by mouth Daily. 30 tablet 0    Phenylephrine HCl (SINEX REGULAR NA) into the nostril(s) as directed by provider As Needed.      triamcinolone (KENALOG) 0.1 % cream       hydrOXYzine (ATARAX) 10 MG tablet Take 1 tablet by mouth 2 (Two) Times a Day As Needed for Anxiety. 60 tablet 2    lisinopril (PRINIVIL,ZESTRIL) 10 MG tablet Take 1 tablet by mouth Daily. 90 tablet 1    hydrOXYzine pamoate (VISTARIL) 50 MG capsule Take 10 mg by mouth. (Patient not taking: Reported on 9/25/2023)      ondansetron (ZOFRAN) 4 MG tablet TAKE 1 TABLET BY MOUTH THREE TIMES DAILY AS NEEDED ON DAY OF BOWEL PREP (Patient not taking: Reported on 9/25/2023)      sucralfate (Carafate) 1 g tablet Take 1 tablet by mouth 4 (Four) Times a Day. (Patient not taking: Reported on 10/31/2023) 40 tablet 1     No facility-administered medications prior to visit.       No opioid medication identified on active medication list. I have reviewed chart for other potential  high risk medication/s and harmful drug interactions in the elderly.        Aspirin is not on active medication list.  Aspirin use is not indicated based on review of current medical condition/s. Risk of harm outweighs potential benefits.  .    Patient Active Problem List   Diagnosis    Essential hypertension    OAB (overactive bladder)    Vitamin D deficiency    Generalized anxiety disorder    Other fatigue    Non-seasonal allergic rhinitis due to pollen    Annual physical exam    Gastroesophageal reflux disease without esophagitis    Morbid (severe) obesity due to excess calories     Advance Care Planning   Advance Care Planning     Advance Directive is not on file.  ACP discussion was held with the patient during this visit. Patient does not have an advance directive, information provided.     Objective    Vitals:    10/31/23 1507  "  BP: 136/42   BP Location: Left arm   Patient Position: Sitting   Cuff Size: Large Adult   Pulse: 71   SpO2: 99%   Weight: 103 kg (226 lb 9.6 oz)   Height: 165.1 cm (65\")     Estimated body mass index is 37.71 kg/m² as calculated from the following:    Height as of this encounter: 165.1 cm (65\").    Weight as of this encounter: 103 kg (226 lb 9.6 oz).           Does the patient have evidence of cognitive impairment? No    Lab Results   Component Value Date    TRIG 77 10/26/2023    HDL 57 10/26/2023    LDL 93 10/26/2023    VLDL 15 10/26/2023        HEALTH RISK ASSESSMENT    Smoking Status:  Social History     Tobacco Use   Smoking Status Never   Smokeless Tobacco Never   Tobacco Comments    none     Alcohol Consumption:  Social History     Substance and Sexual Activity   Alcohol Use Not Currently     Fall Risk Screen:    SARA Fall Risk Assessment was completed, and patient is at LOW risk for falls.Assessment completed on:10/31/2023    Depression Screening:      10/31/2023     3:12 PM   PHQ-2/PHQ-9 Depression Screening   Little Interest or Pleasure in Doing Things 0-->not at all   Feeling Down, Depressed or Hopeless 0-->not at all   PHQ-9: Brief Depression Severity Measure Score 0       Health Habits and Functional and Cognitive Screening:      10/31/2023     3:12 PM   Functional & Cognitive Status   Do you have difficulty preparing food and eating? No   Do you have difficulty bathing yourself, getting dressed or grooming yourself? No   Do you have difficulty using the toilet? No   Do you have difficulty moving around from place to place? No   Do you have trouble with steps or getting out of a bed or a chair? No   Current Diet Well Balanced Diet   Dental Exam Up to date   Eye Exam Up to date   Exercise (times per week) 2 times per week   Current Exercises Include Walking   Do you need help using the phone?  No   Are you deaf or do you have serious difficulty hearing?  No   Do you need help to go to places out of " walking distance? No   Do you need help shopping? No   Do you need help preparing meals?  No   Do you need help with housework?  No   Do you need help with laundry? No   Do you need help taking your medications? No   Do you need help managing money? No   Do you ever drive or ride in a car without wearing a seat belt? No   Have you felt unusual stress, anger or loneliness in the last month? No   Who do you live with? Alone   If you need help, do you have trouble finding someone available to you? No   Have you been bothered in the last four weeks by sexual problems? No   Do you have difficulty concentrating, remembering or making decisions? No       Age-appropriate Screening Schedule:  Refer to the list below for future screening recommendations based on patient's age, sex and/or medical conditions. Orders for these recommended tests are listed in the plan section. The patient has been provided with a written plan.    Health Maintenance   Topic Date Due    TDAP/TD VACCINES (1 - Tdap) Never done    ZOSTER VACCINE (2 of 2) 12/29/2015    DXA SCAN  07/24/2019    COVID-19 Vaccine (3 - 2023-24 season) 11/02/2023 (Originally 9/1/2023)    INFLUENZA VACCINE  03/31/2024 (Originally 8/1/2023)    BMI FOLLOWUP  04/25/2024    ANNUAL WELLNESS VISIT  10/31/2024    MAMMOGRAM  02/09/2025    COLORECTAL CANCER SCREENING  06/28/2033    HEPATITIS C SCREENING  Completed    Pneumococcal Vaccine 65+  Completed                  CMS Preventative Services Quick Reference  Risk Factors Identified During Encounter  Immunizations Discussed/Encouraged: Tdap, Influenza, Shingrix, and COVID19  The above risks/problems have been discussed with the patient.  Pertinent information has been shared with the patient in the After Visit Summary.  An After Visit Summary and PPPS were made available to the patient.    Follow Up:   Next Medicare Wellness visit to be scheduled in 1 year.       Additional E&M Note during same encounter follows:  Patient has  "multiple medical problems which are significant and separately identifiable that require additional work above and beyond the Medicare Wellness Visit.      Chief Complaint  Medicare Wellness-subsequent    Subjective        HPI  Bethanie Forte is also being seen today for routine f/u of chronic issues.    She had EGD and colonoscopy with Dr. Zayas.  She does have gallstones.  She is on Protonix since her scope for some mild gastritis.  That does seem to have helped her symptoms.    She is on lisinopril for hypertension.  Her blood pressure has been well controlled.  Denies chest pain, palpitations, and shortness of breath.     She is on hydroxyzine as needed for anxiety.  Symptoms of anxiety are well controlled.  No panic attacks.      She is on Claritin and Singulair for chronic allergies.  She is following with Dr. Aranda Allergy.       She is on vitamin D supplement (as well as Jose A/vit D) for vit D deficiency.    Review of Systems   Constitutional:  Negative for chills, fatigue and fever.   HENT:  Positive for congestion and rhinorrhea. Negative for hearing loss.    Eyes:  Negative for pain and visual disturbance.   Respiratory:  Negative for cough and shortness of breath.    Cardiovascular:  Negative for chest pain and palpitations.   Gastrointestinal:  Positive for abdominal pain (very mild; improved) and diarrhea (occasional). Negative for constipation, nausea and vomiting.   Genitourinary:  Negative for difficulty urinating and dysuria.   Musculoskeletal:  Negative for arthralgias and myalgias.   Neurological:  Negative for weakness and numbness.   Psychiatric/Behavioral:  Negative for dysphoric mood and sleep disturbance. The patient is not nervous/anxious.        Objective   Vital Signs:  /42 (BP Location: Left arm, Patient Position: Sitting, Cuff Size: Large Adult)   Pulse 71   Ht 165.1 cm (65\")   Wt 103 kg (226 lb 9.6 oz)   SpO2 99%   BMI 37.71 kg/m²     Physical Exam  Vitals reviewed. "   Constitutional:       General: She is not in acute distress.     Appearance: Normal appearance. She is well-developed.   HENT:      Head: Normocephalic and atraumatic.      Right Ear: External ear normal.      Left Ear: External ear normal.      Mouth/Throat:      Mouth: Mucous membranes are moist.   Eyes:      Extraocular Movements: Extraocular movements intact.      Conjunctiva/sclera: Conjunctivae normal.      Pupils: Pupils are equal, round, and reactive to light.   Cardiovascular:      Rate and Rhythm: Normal rate and regular rhythm.      Heart sounds: No murmur heard.  Pulmonary:      Effort: Pulmonary effort is normal.      Breath sounds: Normal breath sounds. No wheezing, rhonchi or rales.   Abdominal:      General: Bowel sounds are normal. There is no distension.      Palpations: Abdomen is soft.      Tenderness: There is no abdominal tenderness.   Musculoskeletal:         General: Normal range of motion.   Skin:     General: Skin is warm and dry.   Neurological:      Mental Status: She is alert and oriented to person, place, and time.      Deep Tendon Reflexes: Reflexes normal.   Psychiatric:         Mood and Affect: Mood and affect normal.         Behavior: Behavior normal.         Thought Content: Thought content normal.         Judgment: Judgment normal.        Lab Results   Component Value Date    GLUCOSE 90 10/26/2023    BUN 15 10/26/2023    CREATININE 0.75 10/26/2023    EGFR 85.8 10/26/2023    BCR 20.0 10/26/2023    K 4.2 10/26/2023    CO2 27.2 10/26/2023    CALCIUM 10.0 10/26/2023    ALBUMIN 4.3 10/26/2023    BILITOT 0.3 10/26/2023    AST 18 10/26/2023    ALT 19 10/26/2023       Lab Results   Component Value Date    CHOL 165 10/26/2023    CHLPL 173 04/20/2021    TRIG 77 10/26/2023    HDL 57 10/26/2023    LDL 93 10/26/2023       Lab Results   Component Value Date    WBC 5.18 10/26/2023    HGB 13.3 10/26/2023    HCT 40.1 10/26/2023    MCV 88.5 10/26/2023     10/26/2023                       Assessment and Plan   Diagnoses and all orders for this visit:    1. Annual physical exam (Primary)  Assessment & Plan:  She is UTD on colonoscopy, last done 6/2023 and this showed polyp x1.  Ten-year repeat recommended.  Pap smear is no longer indicated by age and history.  She is up-to-date on mammogram, last done 2/2023 and this was normal.  She is reportedly UTD on DEXA, done at [facility] in [facility]; scheduled for repeat 11/14/2023.  She is UTD on Pneumovax (10/2021), Prevnar-13 (10/2020) and Zostavax (11/2015).  She is due for COVID, Shingrix, Td and flu shot.  Flu and COVID declined today.  Shingrix and Tdap can be done at the pharmacy.    She is UTD on routine labs.        2. Essential hypertension  Assessment & Plan:  Blood pressure has been running at goal.  Continue lisinopril 10 daily.  Refills were needed today.  Labs were not needed today.  Continue to monitor.      Orders:  -     lisinopril (PRINIVIL,ZESTRIL) 10 MG tablet; Take 1 tablet by mouth Daily.  Dispense: 90 tablet; Refill: 1    3. Generalized anxiety disorder  Assessment & Plan:  Stable on hydroxyzine 10 mg twice daily as needed.  Refills were needed today.  Continue to monitor.      Orders:  -     hydrOXYzine (ATARAX) 10 MG tablet; Take 1 tablet by mouth 2 (Two) Times a Day As Needed for Anxiety.  Dispense: 60 tablet; Refill: 2    4. Non-seasonal allergic rhinitis due to pollen  Overview:  Following Dr. Manoj Bennett.  She is stable on Claritin and montelukast 10 mg daily.  Also using azelastine nasal spray.  No refills needed today.      5. Gastroesophageal reflux disease without esophagitis  Assessment & Plan:  Stable on pantoprazole 40 mg daily.  Refills were not needed today.  Continue to monitor.  Wean PPI as able.      Other orders  -     Discontinue: hydrOXYzine (ATARAX) 10 MG tablet; Take 1 tablet by mouth 2 (Two) Times a Day As Needed for Anxiety.  Dispense: 60 tablet; Refill: 2               Follow Up   Return in  about 6 months (around 4/30/2024) for Recheck.  Patient was given instructions and counseling regarding her condition or for health maintenance advice. Please see specific information pulled into the AVS if appropriate.

## 2023-10-31 NOTE — ASSESSMENT & PLAN NOTE
Stable on hydroxyzine 10 mg twice daily as needed.  Refills were needed today.  Continue to monitor.

## 2023-10-31 NOTE — ASSESSMENT & PLAN NOTE
Stable on pantoprazole 40 mg daily.  Refills were not needed today.  Continue to monitor.  Wean PPI as able.

## 2023-10-31 NOTE — ASSESSMENT & PLAN NOTE
Blood pressure has been running at goal.  Continue lisinopril 10 daily.  Refills were needed today.  Labs were not needed today.  Continue to monitor.

## 2023-11-01 ENCOUNTER — PRIOR AUTHORIZATION (OUTPATIENT)
Dept: FAMILY MEDICINE CLINIC | Age: 68
End: 2023-11-01
Payer: MEDICARE

## 2023-11-14 ENCOUNTER — OFFICE VISIT (OUTPATIENT)
Dept: OBSTETRICS AND GYNECOLOGY | Age: 68
End: 2023-11-14
Payer: MEDICARE

## 2023-11-14 ENCOUNTER — HOSPITAL ENCOUNTER (OUTPATIENT)
Facility: HOSPITAL | Age: 68
Discharge: HOME | End: 2023-11-14
Admitting: NURSE PRACTITIONER
Payer: MEDICARE

## 2023-11-14 VITALS
BODY MASS INDEX: 37.65 KG/M2 | WEIGHT: 226 LBS | DIASTOLIC BLOOD PRESSURE: 80 MMHG | HEIGHT: 65 IN | SYSTOLIC BLOOD PRESSURE: 120 MMHG

## 2023-11-14 DIAGNOSIS — Z12.4 SCREENING FOR CERVICAL CANCER: ICD-10-CM

## 2023-11-14 DIAGNOSIS — Z12.31 SCREENING MAMMOGRAM FOR BREAST CANCER: ICD-10-CM

## 2023-11-14 DIAGNOSIS — Z01.419 WELL FEMALE EXAM WITH ROUTINE GYNECOLOGICAL EXAM: Primary | ICD-10-CM

## 2023-11-14 DIAGNOSIS — Z11.51 SCREENING FOR HPV (HUMAN PAPILLOMAVIRUS): ICD-10-CM

## 2023-11-14 PROCEDURE — 77080 DXA BONE DENSITY AXIAL: CPT

## 2023-11-14 NOTE — PROGRESS NOTES
"[facility]   Obstetrics and Gynecology     2023    Patient: Bethanie Forte          MR#:[id number]    History of Present Illness    Chief Complaint   Patient presents with    Gynecologic Exam     Annual Exam   C/o : Pt stated \" no issues \".   Last pap 10/26/2020 Normal Hpv neg   Dexa skin 2017   Colonoscopy 2023          68 y.o. female No obstetric history on file. Very pleasant patient who presents for annual exam.   Postmenopausal denies vaginal bleeding   No hrt doing well   Sees pcp for wellness labs  UTD colonoscopy was told she has gallstones protonix is helping  Dexa today  Pap  pt request pap this year  Denies vaginal or urinary issues   Takes vit d and calcium daily      Relevant data reviewed:    No LMP recorded (lmp unknown). Patient is postmenopausal.  Obstetric History:  OB History    No obstetric history on file.        Menstrual History:     No LMP recorded (lmp unknown). Patient is postmenopausal.       Social History     Substance and Sexual Activity   Sexual Activity Defer     ______________________________________  Patient Active Problem List   Diagnosis    Essential hypertension    OAB (overactive bladder)    Vitamin D deficiency    Generalized anxiety disorder    Other fatigue    Non-seasonal allergic rhinitis due to pollen    Annual physical exam    Gastroesophageal reflux disease without esophagitis    Morbid (severe) obesity due to excess calories     Past Medical History:   Diagnosis Date    Allergic     Anxiety     Hypertension     Vitamin D deficiency      Past Surgical History:   Procedure Laterality Date    COLONOSCOPY      [place] over 10 years ago     Social History     Tobacco Use   Smoking Status Never   Smokeless Tobacco Never   Tobacco Comments    none     Family History   Problem Relation Age of Onset    Restless legs syndrome Mother          in her 90s    Heart attack Father         dec age 46    Hypertension Sister     Coronary " artery disease Brother     Hypertension Son     Breast cancer Maternal Aunt      Prior to Admission medications    Medication Sig Start Date End Date Taking? Authorizing Provider   azelastine (ASTELIN) 0.1 % nasal spray 2 sprays into the nostril(s) as directed by provider Daily As Needed for Rhinitis or Allergies. Use in each nostril as directed 10/26/22  Yes Alivia Montano MD   Calcium 150 MG tablet Take  by mouth Daily.   Yes Lamin Stuart MD   Cholecalciferol (VITAMIN D3) 1000 units capsule Take  by mouth.   Yes Lamin Stuart MD   EPINEPHrine (EPIPEN) 0.3 MG/0.3ML solution auto-injector injection As Needed. 7/13/18  Yes Lamin Stuart MD   fluticasone (FLONASE) 50 MCG/ACT nasal spray 2 sprays by Each Nare route Daily. Shake well before using. 9/7/23  Yes Lamin Stuart MD   hydrOXYzine (ATARAX) 10 MG tablet Take 1 tablet by mouth 2 (Two) Times a Day As Needed for Anxiety. 10/31/23  Yes Alivia Montano MD   lisinopril (PRINIVIL,ZESTRIL) 10 MG tablet Take 1 tablet by mouth Daily. 10/31/23  Yes Alivia Montano MD   loratadine (CLARITIN) 10 MG tablet Take 1 tablet by mouth As Needed.   Yes Lamin Stuart MD   montelukast (SINGULAIR) 10 MG tablet Take 1 tablet by mouth As Needed. 7/13/18  Yes Lamin Stuart MD   pantoprazole (PROTONIX) 40 MG EC tablet Take 1 tablet by mouth Daily. 9/25/23  Yes Ade Roberts APRN   Phenylephrine HCl (SINEX REGULAR NA) into the nostril(s) as directed by provider As Needed.   Yes Lamin Stuart MD   triamcinolone (KENALOG) 0.1 % cream  6/15/23  Yes Lamin Stuart MD     _______________________________________    Current contraception: post menopausal status  History of abnormal Pap smear: no  Family history of uterine or ovarian cancer: no  Family History of colon cancer/colon polyps: no  Family History of Breast Cancer:yes - aunt  History of abnormal mammogram: no  History of abnormal lipids:  "no    The following portions of the patient's history were reviewed and updated as appropriate: allergies, current medications, past family history, past medical history, past social history, past surgical history, and problem list.    Review of Systems    Pertinent items are noted in HPI.       Objective   Physical Exam    /80 (BP Location: Left arm, Patient Position: Sitting, Cuff Size: Large Adult)   Ht 165.1 cm (65\")   Wt 103 kg (226 lb)   LMP  (LMP Unknown)   BMI 37.61 kg/m²    BP Readings from Last 3 Encounters:   11/14/23 120/80   10/31/23 136/42   09/25/23 141/58      Wt Readings from Last 3 Encounters:   11/14/23 103 kg (226 lb)   10/31/23 103 kg (226 lb 9.6 oz)   09/25/23 102 kg (224 lb 6.4 oz)        BMI: Estimated body mass index is 37.61 kg/m² as calculated from the following:    Height as of this encounter: 165.1 cm (65\").    Weight as of this encounter: 103 kg (226 lb).       General: alert, appears stated age, cooperative, and mildly obese   Heart: regular rate and rhythm, S1, S2 normal, no murmur, click, rub or gallop   Lungs: clear to auscultation bilaterally   Abdomen: soft, non-tender, without masses or organomegaly and soft, non-tender, without masses, no organomegaly   Breast: inspection negative, no nipple discharge or bleeding, no masses or nodularity palpable   External genitalia/Vulva: mild atrophy, External genitalia including bartholin's glands, Urethra, [place]'s gland and urethra meatus are normal, Perineum, rectum and anus appear normal , and Bladder appears normal without significant prolapse    Vagina: normal mucosa, normal discharge   Cervix: no lesions   Uterus: normal size and non-tender   Adnexa: exam limited by habitus     As part of wellness and prevention, the following topics were discussed with the patient:  Encouraged self breast exam  Physical activity and regular exercised encouraged.       Reminder:   Complete UI metric     Problem List   Meds  History  Prep " for Surg   Imagin}    Assessment:  Diagnoses and all orders for this visit:    1. Well female exam with routine gynecological exam (Primary)    2. Screening mammogram for breast cancer  -     Mammo Screening Digital Tomosynthesis Bilateral With CAD; Future    3. Screening for cervical cancer  -     IGP, Apt HPV,rfx 16 / 18,45    4. Screening for HPV (human papillomavirus)  -     IGP, Apt HPV,rfx 16 / 18,45      Plan: pap this year discussed guidelines stop at 65 she would like to proceed with pap this year will review dexa follow up yearly/ every 2 years All of the patient's questions were addressed and answered, I have encouraged her to call for today's test results if she has not received them within 10 days.  Patient is advised to call with any change in her condition or with any other questions, otherwise return in 12 months for annual examination. Encouraged vitamin D 800mg supplement and 1200mg calcium supplement over the counter incorporate 150 minutes of aerobic exercise weekly with strength training  Return in 2 years (on 2025) for Annual exam.    Tati Nguyen, WHITNEY  2023 11:38 EST

## 2023-12-01 ENCOUNTER — OFFICE VISIT (OUTPATIENT)
Dept: FAMILY MEDICINE CLINIC | Age: 68
End: 2023-12-01
Payer: MEDICARE

## 2023-12-01 VITALS
BODY MASS INDEX: 37.05 KG/M2 | HEIGHT: 65 IN | OXYGEN SATURATION: 96 % | HEART RATE: 82 BPM | WEIGHT: 222.4 LBS | DIASTOLIC BLOOD PRESSURE: 78 MMHG | SYSTOLIC BLOOD PRESSURE: 137 MMHG

## 2023-12-01 DIAGNOSIS — I48.91 ATRIAL FIBRILLATION, UNSPECIFIED TYPE: Primary | ICD-10-CM

## 2023-12-01 DIAGNOSIS — R00.2 HEART PALPITATIONS: ICD-10-CM

## 2023-12-01 NOTE — PROGRESS NOTES
"Chief Complaint  Palpitations (Sx started this am )    Subjective        Bethanie Forte presents to [facility]  Palpitations   This is a new problem. The current episode started today. The problem has been unchanged. Associated symptoms include an irregular heartbeat, malaise/fatigue and shortness of breath. Pertinent negatives include no chest pain or dizziness. She has tried nothing for the symptoms. The treatment provided no relief. Risk factors include dyslipidemia, obesity, family history, stress and post menopause.       Objective   Vital Signs:  /78 (BP Location: Right arm, Patient Position: Sitting, Cuff Size: Large Adult)   Pulse 82   Ht 165.1 cm (65\")   Wt 101 kg (222 lb 6.4 oz)   SpO2 96% Comment: room air  BMI 37.01 kg/m²   Estimated body mass index is 37.01 kg/m² as calculated from the following:    Height as of this encounter: 165.1 cm (65\").    Weight as of this encounter: 101 kg (222 lb 6.4 oz).               Physical Exam  Cardiovascular:      Rate and Rhythm: Tachycardia present. Rhythm irregular.   Pulmonary:      Effort: Pulmonary effort is normal.   Skin:     General: Skin is warm and dry.   Neurological:      Mental Status: She is alert and oriented to person, place, and time.   Psychiatric:         Mood and Affect: Mood normal.        Result Review :              ECG 12 Lead    Date/Time: 12/1/2023 10:15 AM  Performed by: Ade Roberts APRN    Authorized by: Ade Roberts APRN  Previous ECG: no previous ECG available  Rhythm: atrial fibrillation  Rate: tachycardic  Other findings: left ventricular hypertrophy    Clinical impression: abnormal EKG  Comments: A-fib with rapid V-rate. Probable LVH with secondary repolarization abnormality-AKM  Sent to ED            Assessment and Plan   Diagnoses and all orders for this visit:    1. Atrial fibrillation, unspecified type (Primary)  Comments:  Sent to the ED at this time for further evaluation    2. " Heart palpitations  -     ECG 12 Lead             Follow Up   Return if symptoms worsen or fail to improve.  Patient was given instructions and counseling regarding her condition or for health maintenance advice. Please see specific information pulled into the AVS if appropriate.

## 2023-12-12 ENCOUNTER — OFFICE VISIT (OUTPATIENT)
Dept: FAMILY MEDICINE CLINIC | Age: 68
End: 2023-12-12
Payer: MEDICARE

## 2023-12-12 VITALS
WEIGHT: 223 LBS | HEIGHT: 65 IN | SYSTOLIC BLOOD PRESSURE: 149 MMHG | DIASTOLIC BLOOD PRESSURE: 72 MMHG | BODY MASS INDEX: 37.15 KG/M2 | OXYGEN SATURATION: 96 % | HEART RATE: 66 BPM

## 2023-12-12 DIAGNOSIS — I48.0 PAROXYSMAL ATRIAL FIBRILLATION: Primary | ICD-10-CM

## 2023-12-12 PROBLEM — Z00.00 ANNUAL PHYSICAL EXAM: Status: RESOLVED | Noted: 2021-10-20 | Resolved: 2023-12-12

## 2023-12-12 RX ORDER — METOPROLOL SUCCINATE 25 MG/1
25 TABLET, EXTENDED RELEASE ORAL DAILY
COMMUNITY
Start: 2023-12-02 | End: 2023-12-12 | Stop reason: SDUPTHER

## 2023-12-12 RX ORDER — METOPROLOL SUCCINATE 25 MG/1
25 TABLET, EXTENDED RELEASE ORAL DAILY
Qty: 30 TABLET | Refills: 5 | Status: SHIPPED | OUTPATIENT
Start: 2023-12-12

## 2023-12-12 NOTE — PROGRESS NOTES
Chief Complaint  Hospital Follow Up Visit (D/c 12/02/23; Afib ) and Atrial Fibrillation    Subjective          Bethanie Forte presents to [facility] today for transition of care appointment after she was admitted to [facility] for new onset atrial fibrillation from 12/1/2023 to 12/2/2023.  Bethanie initially presented with palpitations and dizziness to our office and saw [facility] [place] where she was found to be in A-fib with RVR.  She was sent to the ED and admitted for further treatment.  She was placed on a diltiazem drip and converted back to normal sinus rhythm after a few hours.  She had echocardiogram done there which was reportedly unremarkable.  She was then started on Eliquis and metoprolol succinate 25 mg daily.  Lisinopril was discontinued at that time.  Was recommended that she follow-up with Cardiology outpatient going forward.    Since then, she has felt an occasional flutter but nothing sustained.  She has f/u with [facility] Cardiology Dr. Calle.  She is going for stress test in a couple of weeks.        Current Outpatient Medications:     apixaban (ELIQUIS) 5 MG tablet tablet, Take 1 tablet by mouth Every 12 (Twelve) Hours., Disp: 60 tablet, Rfl: 5    azelastine (ASTELIN) 0.1 % nasal spray, 2 sprays into the nostril(s) as directed by provider Daily As Needed for Rhinitis or Allergies. Use in each nostril as directed, Disp: 30 mL, Rfl: 0    Calcium 150 MG tablet, Take  by mouth Daily., Disp: , Rfl:     Cholecalciferol (VITAMIN D3) 1000 units capsule, Take  by mouth., Disp: , Rfl:     EPINEPHrine (EPIPEN) 0.3 MG/0.3ML solution auto-injector injection, As Needed., Disp: , Rfl:     fluticasone (FLONASE) 50 MCG/ACT nasal spray, 2 sprays by Each Nare route Daily. Shake well before using., Disp: , Rfl:     hydrOXYzine (ATARAX) 10 MG tablet, Take 1 tablet by mouth 2 (Two) Times a Day As Needed for Anxiety., Disp: 60 tablet, Rfl: 2    loratadine (CLARITIN) 10 MG tablet, Take 1  "tablet by mouth As Needed., Disp: , Rfl:     metoprolol succinate XL (TOPROL-XL) 25 MG 24 hr tablet, Take 1 tablet by mouth Daily., Disp: 30 tablet, Rfl: 5    montelukast (SINGULAIR) 10 MG tablet, Take 1 tablet by mouth As Needed., Disp: , Rfl:     pantoprazole (PROTONIX) 40 MG EC tablet, Take 1 tablet by mouth Daily., Disp: 30 tablet, Rfl: 0    Phenylephrine HCl (SINEX REGULAR NA), into the nostril(s) as directed by provider As Needed., Disp: , Rfl:     triamcinolone (KENALOG) 0.1 % cream, , Disp: , Rfl:   Medications Discontinued During This Encounter   Medication Reason    lisinopril (PRINIVIL,ZESTRIL) 10 MG tablet *Therapy completed    apixaban (ELIQUIS) 5 MG tablet tablet Reorder    metoprolol succinate XL (TOPROL-XL) 25 MG 24 hr tablet Reorder         Allergies:  Patient has no known allergies.      Objective   Vital Signs:   Vitals:    12/12/23 1417 12/12/23 1510   BP: 144/49 149/72   BP Location: Right arm Right arm   Patient Position: Sitting Sitting   Cuff Size: Large Adult Large Adult   Pulse: 64 66   SpO2: 96%    Weight: 101 kg (223 lb)    Height: 165.1 cm (65\")          Physical Exam  Vitals reviewed.   Constitutional:       General: She is not in acute distress.     Appearance: Normal appearance. She is well-developed.   HENT:      Head: Normocephalic and atraumatic.      Right Ear: External ear normal.      Left Ear: External ear normal.   Eyes:      Extraocular Movements: Extraocular movements intact.      Conjunctiva/sclera: Conjunctivae normal.      Pupils: Pupils are equal, round, and reactive to light.   Cardiovascular:      Rate and Rhythm: Normal rate and regular rhythm.      Heart sounds: No murmur heard.     Comments: REGULAR  Pulmonary:      Effort: Pulmonary effort is normal.      Breath sounds: Normal breath sounds. No wheezing, rhonchi or rales.   Abdominal:      General: Bowel sounds are normal. There is no distension.      Palpations: Abdomen is soft.      Tenderness: There is no " abdominal tenderness.   Musculoskeletal:         General: Normal range of motion.   Neurological:      Mental Status: She is alert.   Psychiatric:         Mood and Affect: Affect normal.           Lab Results   Component Value Date    GLUCOSE 90 10/26/2023    BUN 15 10/26/2023    CREATININE 0.75 10/26/2023    EGFRIFNONA 85 02/08/2022    EGFRIFAFRI 90 03/27/2018    BCR 20.0 10/26/2023    K 3.9 12/02/2023    CO2 27.2 10/26/2023    CALCIUM 10.0 10/26/2023    ALBUMIN 4.3 10/26/2023    LABIL2 1.7 04/20/2021    AST 18 10/26/2023    ALT 19 10/26/2023       Lab Results   Component Value Date    CHOL 165 10/26/2023    CHLPL 173 04/20/2021    TRIG 77 10/26/2023    HDL 57 10/26/2023    LDL 93 10/26/2023            Assessment and Plan    Diagnoses and all orders for this visit:    1. Paroxysmal atrial fibrillation (Primary)  Assessment & Plan:  Doing better since discharge from [facility] on 12/2/2023 with new onset A-fib.  She was started on Eliquis 5 mg twice daily at that time and has been doing well.  No bleeding complaints.  She does need some refills today which I have sent in for her.  Lisinopril was discontinued so that she could be started on metoprolol succinate 25 mg daily instead for rate control and hypertension control both.  She is tolerating that well also.  Refills sent today as below.  She has follow-up scheduled with [facility] Cardiology Dr. Calle and is going for stress test shortly.  Echo was already performed and was reportedly unremarkable.  I will see her back in 4 months as scheduled or sooner as needed.    Orders:  -     apixaban (ELIQUIS) 5 MG tablet tablet; Take 1 tablet by mouth Every 12 (Twelve) Hours.  Dispense: 60 tablet; Refill: 5  -     metoprolol succinate XL (TOPROL-XL) 25 MG 24 hr tablet; Take 1 tablet by mouth Daily.  Dispense: 30 tablet; Refill: 5        Follow Up   Return if symptoms worsen or fail to improve, for Or as scheduled 4/23/2024.  Patient was given instructions and counseling  regarding her condition or for health maintenance advice. Please see specific information pulled into the AVS if appropriate.           12/12/2023

## 2023-12-12 NOTE — ASSESSMENT & PLAN NOTE
Doing better since discharge from [facility] on 12/2/2023 with new onset A-fib.  She was started on Eliquis 5 mg twice daily at that time and has been doing well.  No bleeding complaints.  She does need some refills today which I have sent in for her.  Lisinopril was discontinued so that she could be started on metoprolol succinate 25 mg daily instead for rate control and hypertension control both.  She is tolerating that well also.  Refills sent today as below.  She has follow-up scheduled with [facility] Cardiology Dr. Calle and is going for stress test shortly.  Echo was already performed and was reportedly unremarkable.  I will see her back in 4 months as scheduled or sooner as needed.

## 2024-01-02 DIAGNOSIS — I48.0 PAROXYSMAL ATRIAL FIBRILLATION: ICD-10-CM

## 2024-01-02 NOTE — TELEPHONE ENCOUNTER
Caller: Bethanie Forte    Relationship: Self    Best call back number: [phone]     Requested Prescriptions:   Requested Prescriptions     Pending Prescriptions Disp Refills    apixaban (ELIQUIS) 5 MG tablet tablet 60 tablet 5     Sig: Take 1 tablet by mouth Every 12 (Twelve) Hours.        Pharmacy where request should be sent: [id number] - [place], [place] - [address] AT [facility] OF [address] - [phone] [facility] [phone] FX     Last office visit with prescribing clinician: 12/12/2023   Last telemedicine visit with prescribing clinician: Visit date not found   Next office visit with prescribing clinician: 4/23/2024     Additional details provided by patient: PATIENT HAS LESS THAN THREE DAY SUPPLY ON HAND, ONE LEFT ALMOST COMPLETLEY OUT.      Does the patient have less than a 3 day supply:  [x] Yes  [] No    Would you like a call back once the refill request has been completed: [x] Yes [] No    If the office needs to give you a call back, can they leave a voicemail: [x] Yes [] No    Raymond Pierce Rep   01/02/24 11:43 EST

## 2024-01-03 DIAGNOSIS — K21.9 GASTROESOPHAGEAL REFLUX DISEASE WITHOUT ESOPHAGITIS: ICD-10-CM

## 2024-01-03 RX ORDER — PANTOPRAZOLE SODIUM 40 MG/1
40 TABLET, DELAYED RELEASE ORAL DAILY
Qty: 30 TABLET | Refills: 2 | Status: SHIPPED | OUTPATIENT
Start: 2024-01-03

## 2024-01-10 ENCOUNTER — TELEPHONE (OUTPATIENT)
Dept: FAMILY MEDICINE CLINIC | Age: 69
End: 2024-01-10
Payer: MEDICARE

## 2024-01-10 DIAGNOSIS — F41.9 ANXIETY DUE TO INVASIVE PROCEDURE: Primary | ICD-10-CM

## 2024-01-10 RX ORDER — LORAZEPAM 0.5 MG/1
0.5 TABLET ORAL ONCE
Qty: 1 TABLET | Refills: 0 | Status: SHIPPED | OUTPATIENT
Start: 2024-01-10 | End: 2024-01-10

## 2024-01-10 NOTE — TELEPHONE ENCOUNTER
Rx sent for lorazepam x1 to be used prior to stress test.  She may need a  if it is sedating for her.  Thanks, BZM

## 2024-01-10 NOTE — TELEPHONE ENCOUNTER
Caller: Bethanie Forte A    Relationship: Self    Best call back number: [phone]    What medication are you requesting: PILL TO CALM HER DOWN     What are your current symptoms: N/A    How long have you been experiencing symptoms: N/A    Have you had these symptoms before:    [] Yes  [] No    Have you been treated for these symptoms before:   [] Yes  [] No    If a prescription is needed, what is your preferred pharmacy and phone number: [id number] [address] AT [facility] [address]  - [phone]   [phone] FX     Additional notes:PATIENT IS HAVING PET SCAN ON HER HEART ON TUES 01/16/2024 AND IS CLAUSTROPHOBIC SHE NEEDS SOMETHING TO RELAX HER THE NIGHT BEFORE AND THE DAY OF THE SCAN. SHE HAS TAKEN A VALIUM BEFORE AT A [facility] AND THEY WORK FOR HER. PLEASE SEND NEW PRESCRIPTION TO PHARMACY ASAP.

## 2024-02-13 ENCOUNTER — HOSPITAL ENCOUNTER (OUTPATIENT)
Dept: MAMMOGRAPHY | Facility: HOSPITAL | Age: 69
Discharge: HOME | End: 2024-02-13
Payer: MEDICARE

## 2024-02-13 DIAGNOSIS — Z12.31 SCREENING MAMMOGRAM FOR BREAST CANCER: ICD-10-CM

## 2024-02-13 PROCEDURE — 77063 BREAST TOMOSYNTHESIS BI: CPT

## 2024-02-13 PROCEDURE — 77067 SCR MAMMO BI INCL CAD: CPT

## 2024-04-02 DIAGNOSIS — K21.9 GASTROESOPHAGEAL REFLUX DISEASE WITHOUT ESOPHAGITIS: ICD-10-CM

## 2024-04-03 RX ORDER — PANTOPRAZOLE SODIUM 40 MG/1
40 TABLET, DELAYED RELEASE ORAL DAILY
Qty: 90 TABLET | Refills: 1 | Status: SHIPPED | OUTPATIENT
Start: 2024-04-03

## 2024-04-04 ENCOUNTER — TELEPHONE (OUTPATIENT)
Dept: FAMILY MEDICINE CLINIC | Age: 69
End: 2024-04-04
Payer: MEDICARE

## 2024-04-04 DIAGNOSIS — I10 ESSENTIAL HYPERTENSION: Primary | ICD-10-CM

## 2024-04-04 NOTE — TELEPHONE ENCOUNTER
Caller: Bethanie Forte A    Relationship: Self    Best call back number: [phone]    What orders are you requesting (i.e. lab or imaging): LAB WORK     In what timeframe would the patient need to come in: BEFORE HER APRIL APPOINTMENT     Where will you receive your lab/imaging services: OFFICE     Additional notes: PATIENT WOULD LIKE TO HAVE LABS DONE BEFORE APPOINTMENT SO THAT THEY CAN DISCUSS AT APPOINTMENT IF SHE IS TO HAVE THEM. DONE.  PLEASE ADVISE PATIENT

## 2024-04-09 NOTE — TELEPHONE ENCOUNTER
Labs are in.  OK to come by lab at her convenience.  Will be more accurate if done fasting.  Thanks, ADOLFO

## 2024-04-15 ENCOUNTER — LAB (OUTPATIENT)
Dept: LAB | Facility: HOSPITAL | Age: 69
End: 2024-04-15
Payer: MEDICARE

## 2024-04-15 DIAGNOSIS — I10 ESSENTIAL HYPERTENSION: ICD-10-CM

## 2024-04-15 LAB
ALBUMIN SERPL-MCNC: 4.4 G/DL (ref 3.5–5.2)
ALBUMIN/GLOB SERPL: 1.6 G/DL
ALP SERPL-CCNC: 87 U/L (ref 39–117)
ALT SERPL W P-5'-P-CCNC: 13 U/L (ref 1–33)
ANION GAP SERPL CALCULATED.3IONS-SCNC: 8.8 MMOL/L (ref 5–15)
AST SERPL-CCNC: 14 U/L (ref 1–32)
BASOPHILS # BLD AUTO: 0.01 10*3/MM3 (ref 0–0.2)
BASOPHILS NFR BLD AUTO: 0.2 % (ref 0–1.5)
BILIRUB SERPL-MCNC: 0.4 MG/DL (ref 0–1.2)
BUN SERPL-MCNC: 15 MG/DL (ref 8–23)
BUN/CREAT SERPL: 17.4 (ref 7–25)
CALCIUM SPEC-SCNC: 10.1 MG/DL (ref 8.6–10.5)
CHLORIDE SERPL-SCNC: 105 MMOL/L (ref 98–107)
CHOLEST SERPL-MCNC: 179 MG/DL (ref 0–200)
CO2 SERPL-SCNC: 27.2 MMOL/L (ref 22–29)
CREAT SERPL-MCNC: 0.86 MG/DL (ref 0.57–1)
DEPRECATED RDW RBC AUTO: 43.3 FL (ref 37–54)
EGFRCR SERPLBLD CKD-EPI 2021: 72.8 ML/MIN/1.73
EOSINOPHIL # BLD AUTO: 0.12 10*3/MM3 (ref 0–0.4)
EOSINOPHIL NFR BLD AUTO: 1.9 % (ref 0.3–6.2)
ERYTHROCYTE [DISTWIDTH] IN BLOOD BY AUTOMATED COUNT: 13.6 % (ref 12.3–15.4)
GLOBULIN UR ELPH-MCNC: 2.7 GM/DL
GLUCOSE SERPL-MCNC: 94 MG/DL (ref 65–99)
HCT VFR BLD AUTO: 42.5 % (ref 34–46.6)
HDLC SERPL-MCNC: 66 MG/DL (ref 40–60)
HGB BLD-MCNC: 14 G/DL (ref 12–15.9)
IMM GRANULOCYTES # BLD AUTO: 0.01 10*3/MM3 (ref 0–0.05)
IMM GRANULOCYTES NFR BLD AUTO: 0.2 % (ref 0–0.5)
LDLC SERPL CALC-MCNC: 96 MG/DL (ref 0–100)
LDLC/HDLC SERPL: 1.42 {RATIO}
LYMPHOCYTES # BLD AUTO: 1.21 10*3/MM3 (ref 0.7–3.1)
LYMPHOCYTES NFR BLD AUTO: 19.1 % (ref 19.6–45.3)
MCH RBC QN AUTO: 28.5 PG (ref 26.6–33)
MCHC RBC AUTO-ENTMCNC: 32.9 G/DL (ref 31.5–35.7)
MCV RBC AUTO: 86.6 FL (ref 79–97)
MONOCYTES # BLD AUTO: 0.61 10*3/MM3 (ref 0.1–0.9)
MONOCYTES NFR BLD AUTO: 9.6 % (ref 5–12)
NEUTROPHILS NFR BLD AUTO: 4.38 10*3/MM3 (ref 1.7–7)
NEUTROPHILS NFR BLD AUTO: 69 % (ref 42.7–76)
PLATELET # BLD AUTO: 229 10*3/MM3 (ref 140–450)
PMV BLD AUTO: 10.2 FL (ref 6–12)
POTASSIUM SERPL-SCNC: 4 MMOL/L (ref 3.5–5.2)
PROT SERPL-MCNC: 7.1 G/DL (ref 6–8.5)
RBC # BLD AUTO: 4.91 10*6/MM3 (ref 3.77–5.28)
SODIUM SERPL-SCNC: 141 MMOL/L (ref 136–145)
TRIGL SERPL-MCNC: 95 MG/DL (ref 0–150)
VLDLC SERPL-MCNC: 17 MG/DL (ref 5–40)
WBC NRBC COR # BLD AUTO: 6.34 10*3/MM3 (ref 3.4–10.8)

## 2024-04-15 PROCEDURE — 80053 COMPREHEN METABOLIC PANEL: CPT

## 2024-04-15 PROCEDURE — 85025 COMPLETE CBC W/AUTO DIFF WBC: CPT

## 2024-04-15 PROCEDURE — 80061 LIPID PANEL: CPT

## 2024-04-15 PROCEDURE — 36415 COLL VENOUS BLD VENIPUNCTURE: CPT

## 2024-05-17 ENCOUNTER — OFFICE VISIT (OUTPATIENT)
Dept: FAMILY MEDICINE CLINIC | Age: 69
End: 2024-05-17
Payer: MEDICARE

## 2024-05-17 ENCOUNTER — LAB (OUTPATIENT)
Dept: LAB | Facility: HOSPITAL | Age: 69
End: 2024-05-17
Payer: MEDICARE

## 2024-05-17 VITALS
BODY MASS INDEX: 37.32 KG/M2 | WEIGHT: 224 LBS | TEMPERATURE: 98.5 F | HEART RATE: 60 BPM | HEIGHT: 65 IN | SYSTOLIC BLOOD PRESSURE: 133 MMHG | DIASTOLIC BLOOD PRESSURE: 57 MMHG | OXYGEN SATURATION: 96 %

## 2024-05-17 DIAGNOSIS — E55.9 VITAMIN D DEFICIENCY: ICD-10-CM

## 2024-05-17 DIAGNOSIS — I48.0 PAROXYSMAL ATRIAL FIBRILLATION: ICD-10-CM

## 2024-05-17 DIAGNOSIS — E66.01 CLASS 2 SEVERE OBESITY DUE TO EXCESS CALORIES WITH SERIOUS COMORBIDITY AND BODY MASS INDEX (BMI) OF 37.0 TO 37.9 IN ADULT: ICD-10-CM

## 2024-05-17 DIAGNOSIS — R53.83 OTHER FATIGUE: ICD-10-CM

## 2024-05-17 DIAGNOSIS — F41.1 GENERALIZED ANXIETY DISORDER: ICD-10-CM

## 2024-05-17 DIAGNOSIS — I10 ESSENTIAL HYPERTENSION: Primary | ICD-10-CM

## 2024-05-17 DIAGNOSIS — K21.9 GASTROESOPHAGEAL REFLUX DISEASE WITHOUT ESOPHAGITIS: ICD-10-CM

## 2024-05-17 PROBLEM — E66.812 CLASS 2 SEVERE OBESITY DUE TO EXCESS CALORIES WITH SERIOUS COMORBIDITY AND BODY MASS INDEX (BMI) OF 37.0 TO 37.9 IN ADULT: Status: ACTIVE | Noted: 2023-04-25

## 2024-05-17 LAB
25(OH)D3 SERPL-MCNC: 40.3 NG/ML (ref 30–100)
FOLATE SERPL-MCNC: 13.7 NG/ML (ref 4.78–24.2)
TSH SERPL DL<=0.05 MIU/L-ACNC: 1.43 UIU/ML (ref 0.27–4.2)
VIT B12 BLD-MCNC: 370 PG/ML (ref 211–946)

## 2024-05-17 PROCEDURE — 1125F AMNT PAIN NOTED PAIN PRSNT: CPT | Performed by: FAMILY MEDICINE

## 2024-05-17 PROCEDURE — 3075F SYST BP GE 130 - 139MM HG: CPT | Performed by: FAMILY MEDICINE

## 2024-05-17 PROCEDURE — 1160F RVW MEDS BY RX/DR IN RCRD: CPT | Performed by: FAMILY MEDICINE

## 2024-05-17 PROCEDURE — 1159F MED LIST DOCD IN RCRD: CPT | Performed by: FAMILY MEDICINE

## 2024-05-17 PROCEDURE — 36415 COLL VENOUS BLD VENIPUNCTURE: CPT

## 2024-05-17 PROCEDURE — 82607 VITAMIN B-12: CPT

## 2024-05-17 PROCEDURE — G2211 COMPLEX E/M VISIT ADD ON: HCPCS | Performed by: FAMILY MEDICINE

## 2024-05-17 PROCEDURE — 84443 ASSAY THYROID STIM HORMONE: CPT

## 2024-05-17 PROCEDURE — 82746 ASSAY OF FOLIC ACID SERUM: CPT

## 2024-05-17 PROCEDURE — 82306 VITAMIN D 25 HYDROXY: CPT

## 2024-05-17 PROCEDURE — 99214 OFFICE O/P EST MOD 30 MIN: CPT | Performed by: FAMILY MEDICINE

## 2024-05-17 PROCEDURE — 3078F DIAST BP <80 MM HG: CPT | Performed by: FAMILY MEDICINE

## 2024-05-17 RX ORDER — METOPROLOL SUCCINATE 25 MG/1
25 TABLET, EXTENDED RELEASE ORAL DAILY
Qty: 90 TABLET | Refills: 1 | Status: SHIPPED | OUTPATIENT
Start: 2024-05-17

## 2024-05-17 NOTE — Clinical Note
Bethanie will come in on Monday with her BP cuff to have it checked against ours.  You can just send that encounter to me instead of to on call and I will address it first thing on Tuesday when I get back.  Thanks!  ADOLFO

## 2024-05-17 NOTE — ASSESSMENT & PLAN NOTE
Stable on hydroxyzine 10 mg twice daily as needed.  Refills were not needed today.  Continue to monitor.

## 2024-05-17 NOTE — ASSESSMENT & PLAN NOTE
There is a question about whether her home readings are accurate or not.  If they are, no changes need to be made to her regimen.  If they are not, we will plan to start lisinopril 10 mg daily (#30, 5 RFs to [facility]) in addition to her metoprolol succinate.  She will come in to the allergy room on Monday to have her BP cuff checked against ours.  That value can be sent to me and I will address on Tuesday.

## 2024-05-17 NOTE — PROGRESS NOTES
"Chief Complaint  Hypertension (6 f/u )    Subjective          Bethanie Forte presents to [facility] today for routine follow-up.     She is on metoprolol succinate for HTN and rate control of atrial fibrillation.  She is on Eliquis for anticoagulation.  BP has been well controlled at home when she checks it (120s-30s/60s-70s).  However, she has been consistently running 160s/70s on her last couple of doctor's visits.  Her cardiologist recommended that if remains high, restart lisinopril 10 mg daily.  She was taking this prior to starting metoprolol for the a-fib.  No chest pain, palpitations, and shortness of breath.      She is on hydroxyzine as needed for anxiety.  Mood has been \"okay.\"  No anxiety or panic attacks.    She is on pantoprazole for GERD.  Status post EGD and colonoscopy with Dr. Zayas 6/2023.  EGD showed mild gastritis.  Symptoms have been well-controlled on the PPI without indigestion or reflux.       She is on loratadine and montelukast for chronic allergies.  Follows with Dr. Aranda Allergy.       She is on vitamin D supplementation for vit D deficiency.      Current Outpatient Medications:     apixaban (ELIQUIS) 5 MG tablet tablet, Take 1 tablet by mouth Every 12 (Twelve) Hours., Disp: 180 tablet, Rfl: 1    azelastine (ASTELIN) 0.1 % nasal spray, 2 sprays into the nostril(s) as directed by provider Daily As Needed for Rhinitis or Allergies. Use in each nostril as directed, Disp: 30 mL, Rfl: 0    Calcium 150 MG tablet, Take  by mouth Daily., Disp: , Rfl:     Cholecalciferol (VITAMIN D3) 1000 units capsule, Take  by mouth., Disp: , Rfl:     EPINEPHrine (EPIPEN) 0.3 MG/0.3ML solution auto-injector injection, As Needed., Disp: , Rfl:     fluticasone (FLONASE) 50 MCG/ACT nasal spray, 2 sprays by Each Nare route Daily. Shake well before using., Disp: , Rfl:     hydrOXYzine (ATARAX) 10 MG tablet, Take 1 tablet by mouth 2 (Two) Times a Day As Needed for Anxiety., " "Disp: 60 tablet, Rfl: 2    loratadine (CLARITIN) 10 MG tablet, Take 1 tablet by mouth As Needed., Disp: , Rfl:     metoprolol succinate XL (TOPROL-XL) 25 MG 24 hr tablet, Take 1 tablet by mouth Daily., Disp: 90 tablet, Rfl: 1    montelukast (SINGULAIR) 10 MG tablet, Take 1 tablet by mouth As Needed., Disp: , Rfl:     pantoprazole (PROTONIX) 40 MG EC tablet, TAKE 1 TABLET BY MOUTH DAILY, Disp: 90 tablet, Rfl: 1    Phenylephrine HCl (SINEX REGULAR NA), into the nostril(s) as directed by provider As Needed., Disp: , Rfl:     triamcinolone (KENALOG) 0.1 % cream, , Disp: , Rfl:   Medications Discontinued During This Encounter   Medication Reason    metoprolol succinate XL (TOPROL-XL) 25 MG 24 hr tablet Reorder    apixaban (ELIQUIS) 5 MG tablet tablet Reorder         Allergies:  Patient has no known allergies.      Objective   Vital Signs:   Vitals:    05/17/24 1527 05/17/24 1610   BP: 164/76 133/57   BP Location: Right arm Right arm   Patient Position: Sitting Sitting   Pulse: 61 60   Temp: 98.5 °F (36.9 °C)    TempSrc: Oral    SpO2: 96%    Weight: 102 kg (224 lb)    Height: 165.1 cm (65\")      Body mass index is 37.28 kg/m².  Class 2 Severe Obesity (BMI >=35 and <=39.9). Obesity-related health conditions include the following: hypertension and GERD. Obesity is unchanged. BMI is is above average; BMI management plan is completed. We discussed portion control and increasing exercise.        Physical Exam  Vitals reviewed.   Constitutional:       General: She is not in acute distress.     Appearance: Normal appearance. She is well-developed.   HENT:      Head: Normocephalic and atraumatic.      Right Ear: External ear normal.      Left Ear: External ear normal.   Eyes:      Extraocular Movements: Extraocular movements intact.      Conjunctiva/sclera: Conjunctivae normal.      Pupils: Pupils are equal, round, and reactive to light.   Cardiovascular:      Rate and Rhythm: Normal rate. Rhythm irregularly irregular.      Heart " sounds: No murmur heard.  Pulmonary:      Effort: Pulmonary effort is normal.      Breath sounds: Normal breath sounds. No wheezing, rhonchi or rales.   Abdominal:      General: Bowel sounds are normal. There is no distension.      Palpations: Abdomen is soft.      Tenderness: There is no abdominal tenderness.   Musculoskeletal:         General: Normal range of motion.   Neurological:      Mental Status: She is alert.   Psychiatric:         Mood and Affect: Affect normal.           Lab Results   Component Value Date    GLUCOSE 94 04/15/2024    BUN 15 04/15/2024    CREATININE 0.86 04/15/2024    EGFRIFNONA 85 02/08/2022    EGFRIFAFRI 90 03/27/2018    BCR 17.4 04/15/2024    K 4.0 04/15/2024    CO2 27.2 04/15/2024    CALCIUM 10.1 04/15/2024    ALBUMIN 4.4 04/15/2024    LABIL2 1.7 04/20/2021    AST 14 04/15/2024    ALT 13 04/15/2024       Lab Results   Component Value Date    CHOL 179 04/15/2024    CHLPL 173 04/20/2021    TRIG 95 04/15/2024    HDL 66 (H) 04/15/2024    LDL 96 04/15/2024       Lab Results   Component Value Date    WBC 6.34 04/15/2024    HGB 14.0 04/15/2024    HCT 42.5 04/15/2024    MCV 86.6 04/15/2024     04/15/2024            Assessment and Plan    Assessment & Plan  Essential hypertension    There is a question about whether her home readings are accurate or not.  If they are, no changes need to be made to her regimen.  If they are not, we will plan to start lisinopril 10 mg daily (#30, 5 RFs to [facility]) in addition to her metoprolol succinate.  She will come in to the allergy room on Monday to have her BP cuff checked against ours.  That value can be sent to me and I will address on Tuesday.  Paroxysmal atrial fibrillation  Stable metoprolol succinate 25 mg daily for rate control and Eliquis 5 mg twice daily for anticoagulation.  Refills sent today as below.  She is now following with Cardiology at [facility].  Labs are reviewed and up-to-date.  Class 2 severe obesity due to excess calories with  serious comorbidity and body mass index (BMI) of 37.0 to 37.9 in adult  Patient's (Body mass index is 37.28 kg/m².) indicates that they are  with health conditions that include  . Weight is . BMI  . We discussed .   Gastroesophageal reflux disease without esophagitis  Stable on pantoprazole 40 mg daily.  Refills were not needed today.  Continue to monitor.  Wean PPI as able.   Generalized anxiety disorder  Stable on hydroxyzine 10 mg twice daily as needed.  Refills were not needed today.  Continue to monitor.   Vitamin D deficiency  Checking labs.  Other fatigue  Checking labs.    Orders Placed This Encounter   Procedures    TSH    Vitamin B12 & Folate    Vitamin D,25-Hydroxy     New Medications Ordered This Visit   Medications    metoprolol succinate XL (TOPROL-XL) 25 MG 24 hr tablet     Sig: Take 1 tablet by mouth Daily.     Dispense:  90 tablet     Refill:  1    apixaban (ELIQUIS) 5 MG tablet tablet     Sig: Take 1 tablet by mouth Every 12 (Twelve) Hours.     Dispense:  180 tablet     Refill:  1           Follow Up   Return in about 6 months (around 11/17/2024) for Medicare Wellness.  Patient was given instructions and counseling regarding her condition or for health maintenance advice. Please see specific information pulled into the AVS if appropriate.           05/17/2024

## 2024-05-17 NOTE — ASSESSMENT & PLAN NOTE
Patient's (Body mass index is 37.28 kg/m².) indicates that they are obese (BMI >30) with health conditions that include none . Weight is unchanged. BMI  is above average; BMI management plan is completed. We discussed portion control and increasing exercise.

## 2024-05-17 NOTE — ASSESSMENT & PLAN NOTE
Stable metoprolol succinate 25 mg daily for rate control and Eliquis 5 mg twice daily for anticoagulation.  Refills sent today as below.  She is now following with Cardiology at [facility].  Labs are reviewed and up-to-date.

## 2024-05-22 ENCOUNTER — CLINICAL SUPPORT (OUTPATIENT)
Dept: FAMILY MEDICINE CLINIC | Age: 69
End: 2024-05-22
Payer: MEDICARE

## 2024-05-22 VITALS — DIASTOLIC BLOOD PRESSURE: 62 MMHG | SYSTOLIC BLOOD PRESSURE: 121 MMHG | HEART RATE: 63 BPM

## 2024-05-22 DIAGNOSIS — I10 ESSENTIAL HYPERTENSION: Primary | ICD-10-CM

## 2024-05-22 NOTE — PROGRESS NOTES
Vitals:    05/22/24 1433 05/22/24 1451   BP: 140/72  Comment: on pt's B/P machine. 121/62  Comment: with office B/P machine.   BP Location: Left arm Left arm   Patient Position: Sitting Sitting   Cuff Size: Adult Adult   Pulse: 63 63        Please call Bethanie.  It looks like her blood pressure cuff is actually running a little higher than what we obtained with our cuff.  In any case, if she is consistently getting systolic blood pressure values of 120s to 130s at home, we will not need to increase any of her blood pressure medications.  Please continue to monitor at home as she has been doing.  Thanks, ADOLFO

## 2024-06-04 NOTE — PROGRESS NOTES
"Chief Complaint  positive for covid 10days and still not feeling well    Subjective        Bethanie Forte presents to [facility]  URI   This is a new problem. The current episode started 1 to 4 weeks ago. The problem has been unchanged. Associated symptoms include congestion, headaches and sinus pain. Pertinent negatives include no coughing. She has tried acetaminophen, decongestant and sleep for the symptoms. The treatment provided moderate relief.       Objective   Vital Signs:  /68   Pulse 75   Ht 165.1 cm (65\")   Wt 97.1 kg (214 lb)   SpO2 98%   BMI 35.61 kg/m²   Estimated body mass index is 35.61 kg/m² as calculated from the following:    Height as of this encounter: 165.1 cm (65\").    Weight as of this encounter: 97.1 kg (214 lb).          Physical Exam  HENT:      Head: Normocephalic.      Nose: Congestion present.   Cardiovascular:      Rate and Rhythm: Normal rate and regular rhythm.   Pulmonary:      Effort: Pulmonary effort is normal. No respiratory distress.      Breath sounds: Normal breath sounds. No stridor. No wheezing, rhonchi or rales.   Abdominal:      General: Bowel sounds are normal.      Palpations: Abdomen is soft.   Skin:     General: Skin is warm and dry.   Neurological:      Mental Status: She is alert and oriented to person, place, and time.   Psychiatric:         Mood and Affect: Mood normal.        Result Review :                Assessment and Plan   Diagnoses and all orders for this visit:    1. Acute recurrent maxillary sinusitis (Primary)  Comments:  Lingering congestion, sinus pressure and fatigue post Covid. Resume probiotic, electroylte replacement, antiinflammatory diet, OTC symptomatic treatment   Orders:  -     amoxicillin-clavulanate (Augmentin) 875-125 MG per tablet; Take 1 tablet by mouth 2 (Two) Times a Day for 5 days.  Dispense: 10 tablet; Refill: 0             Follow Up   Return if symptoms worsen or fail to improve.  Patient " Pt received PRN Atarax 100 mg po after pacing the halls. Elevated anxiety d/t  not being available. Upon follow up, pt walking the halls with [facility]. Visibly calmer. Medication effective.   was given instructions and counseling regarding her condition or for health maintenance advice. Please see specific information pulled into the AVS if appropriate.

## 2024-06-30 DIAGNOSIS — K21.9 GASTROESOPHAGEAL REFLUX DISEASE WITHOUT ESOPHAGITIS: ICD-10-CM

## 2024-07-01 RX ORDER — PANTOPRAZOLE SODIUM 40 MG/1
40 TABLET, DELAYED RELEASE ORAL DAILY
Qty: 90 TABLET | Refills: 1 | OUTPATIENT
Start: 2024-07-01

## 2024-11-07 ENCOUNTER — TELEPHONE (OUTPATIENT)
Dept: FAMILY MEDICINE CLINIC | Age: 69
End: 2024-11-07
Payer: MEDICARE

## 2024-11-07 DIAGNOSIS — I10 ESSENTIAL HYPERTENSION: Primary | ICD-10-CM

## 2024-11-10 DIAGNOSIS — I48.0 PAROXYSMAL ATRIAL FIBRILLATION: ICD-10-CM

## 2024-11-11 RX ORDER — APIXABAN 5 MG/1
5 TABLET, FILM COATED ORAL EVERY 12 HOURS
Qty: 180 TABLET | Refills: 1 | Status: SHIPPED | OUTPATIENT
Start: 2024-11-11

## 2024-11-16 DIAGNOSIS — I48.0 PAROXYSMAL ATRIAL FIBRILLATION: ICD-10-CM

## 2024-11-18 RX ORDER — METOPROLOL SUCCINATE 25 MG/1
25 TABLET, EXTENDED RELEASE ORAL DAILY
Qty: 90 TABLET | Refills: 1 | Status: SHIPPED | OUTPATIENT
Start: 2024-11-18

## 2024-11-19 ENCOUNTER — LAB (OUTPATIENT)
Dept: LAB | Facility: HOSPITAL | Age: 69
End: 2024-11-19
Payer: MEDICARE

## 2024-11-19 DIAGNOSIS — I10 ESSENTIAL HYPERTENSION: ICD-10-CM

## 2024-11-19 LAB
ANION GAP SERPL CALCULATED.3IONS-SCNC: 7.9 MMOL/L (ref 5–15)
BUN SERPL-MCNC: 17 MG/DL (ref 8–23)
BUN/CREAT SERPL: 21.8 (ref 7–25)
CALCIUM SPEC-SCNC: 9.8 MG/DL (ref 8.6–10.5)
CHLORIDE SERPL-SCNC: 106 MMOL/L (ref 98–107)
CO2 SERPL-SCNC: 26.1 MMOL/L (ref 22–29)
CREAT SERPL-MCNC: 0.78 MG/DL (ref 0.57–1)
EGFRCR SERPLBLD CKD-EPI 2021: 81.3 ML/MIN/1.73
GLUCOSE SERPL-MCNC: 96 MG/DL (ref 65–99)
HCT VFR BLD AUTO: 40.5 % (ref 34–46.6)
HGB BLD-MCNC: 13.5 G/DL (ref 12–15.9)
POTASSIUM SERPL-SCNC: 3.8 MMOL/L (ref 3.5–5.2)
SODIUM SERPL-SCNC: 140 MMOL/L (ref 136–145)

## 2024-11-19 PROCEDURE — 85018 HEMOGLOBIN: CPT

## 2024-11-19 PROCEDURE — 80048 BASIC METABOLIC PNL TOTAL CA: CPT

## 2024-11-19 PROCEDURE — 85014 HEMATOCRIT: CPT

## 2024-11-19 PROCEDURE — 36415 COLL VENOUS BLD VENIPUNCTURE: CPT

## 2024-11-26 ENCOUNTER — OFFICE VISIT (OUTPATIENT)
Dept: FAMILY MEDICINE CLINIC | Age: 69
End: 2024-11-26
Payer: MEDICARE

## 2024-11-26 VITALS
HEART RATE: 58 BPM | BODY MASS INDEX: 38.49 KG/M2 | SYSTOLIC BLOOD PRESSURE: 153 MMHG | WEIGHT: 231 LBS | DIASTOLIC BLOOD PRESSURE: 80 MMHG | OXYGEN SATURATION: 97 % | TEMPERATURE: 97.7 F | HEIGHT: 65 IN

## 2024-11-26 DIAGNOSIS — E66.01 CLASS 2 SEVERE OBESITY DUE TO EXCESS CALORIES WITH SERIOUS COMORBIDITY AND BODY MASS INDEX (BMI) OF 38.0 TO 38.9 IN ADULT: ICD-10-CM

## 2024-11-26 DIAGNOSIS — K21.9 GASTROESOPHAGEAL REFLUX DISEASE WITHOUT ESOPHAGITIS: ICD-10-CM

## 2024-11-26 DIAGNOSIS — I10 ESSENTIAL HYPERTENSION: ICD-10-CM

## 2024-11-26 DIAGNOSIS — F41.1 GENERALIZED ANXIETY DISORDER: ICD-10-CM

## 2024-11-26 DIAGNOSIS — E66.812 CLASS 2 SEVERE OBESITY DUE TO EXCESS CALORIES WITH SERIOUS COMORBIDITY AND BODY MASS INDEX (BMI) OF 38.0 TO 38.9 IN ADULT: ICD-10-CM

## 2024-11-26 DIAGNOSIS — Z00.00 ANNUAL PHYSICAL EXAM: Primary | ICD-10-CM

## 2024-11-26 DIAGNOSIS — J30.1 NON-SEASONAL ALLERGIC RHINITIS DUE TO POLLEN: ICD-10-CM

## 2024-11-26 DIAGNOSIS — I48.0 PAROXYSMAL ATRIAL FIBRILLATION: ICD-10-CM

## 2024-11-26 PROCEDURE — 99214 OFFICE O/P EST MOD 30 MIN: CPT | Performed by: FAMILY MEDICINE

## 2024-11-26 PROCEDURE — 1159F MED LIST DOCD IN RCRD: CPT | Performed by: FAMILY MEDICINE

## 2024-11-26 PROCEDURE — 3077F SYST BP >= 140 MM HG: CPT | Performed by: FAMILY MEDICINE

## 2024-11-26 PROCEDURE — 1170F FXNL STATUS ASSESSED: CPT | Performed by: FAMILY MEDICINE

## 2024-11-26 PROCEDURE — 1126F AMNT PAIN NOTED NONE PRSNT: CPT | Performed by: FAMILY MEDICINE

## 2024-11-26 PROCEDURE — G0439 PPPS, SUBSEQ VISIT: HCPCS | Performed by: FAMILY MEDICINE

## 2024-11-26 PROCEDURE — 1160F RVW MEDS BY RX/DR IN RCRD: CPT | Performed by: FAMILY MEDICINE

## 2024-11-26 PROCEDURE — 3079F DIAST BP 80-89 MM HG: CPT | Performed by: FAMILY MEDICINE

## 2024-11-26 RX ORDER — HYDROXYZINE HYDROCHLORIDE 10 MG/1
10 TABLET, FILM COATED ORAL 2 TIMES DAILY PRN
Qty: 60 TABLET | Refills: 2 | Status: SHIPPED | OUTPATIENT
Start: 2024-11-26

## 2024-11-26 RX ORDER — PANTOPRAZOLE SODIUM 40 MG/1
40 TABLET, DELAYED RELEASE ORAL DAILY
Qty: 90 TABLET | Refills: 1 | Status: SHIPPED | OUTPATIENT
Start: 2024-11-26

## 2024-11-26 NOTE — ASSESSMENT & PLAN NOTE
Following Dr. Aranda Allergy. She is stable on Claritin and montelukast 10 mg daily. Also using azelastine nasal spray. No refills needed today.

## 2024-11-26 NOTE — PROGRESS NOTES
Subjective   The ABCs of the Annual Wellness Visit  Medicare Wellness Visit      Bethanie Forte is a 69 y.o. patient who presents for a Medicare Wellness Visit.    She is UTD on colonoscopy, last done 6/2023 and this showed polyp x1.  Ten-year repeat recommended.  Pap smear is no longer indicated by age and history.  She is up-to-date on mammogram, last done 2/2024 and this was normal.  She is UTD on DEXA, last done 11/2023 at [facility] and this showed osteopenia.  She is UTD on Pneumovax (10/2021), Prevnar-13 (10/2020) and Zostavax (11/2015).  She is due for COVID, Shingrix, Td and flu.  She is UTD on routine labs.      The following portions of the patient's history were reviewed and   updated as appropriate: allergies, current medications, past family history, past medical history, past social history, past surgical history, and problem list.    Compared to one year ago, the patient's physical   health is the same.  Compared to one year ago, the patient's mental   health is the same.    Recent Hospitalizations:  She was admitted within the past 365 days at [facility].     Current Medical Providers:  Patient Care Team:  Alivia Montano MD as PCP - Bernabe Carvajal MD as Consulting Physician (Allergy and Immunology)    Outpatient Medications Prior to Visit   Medication Sig Dispense Refill   • azelastine (ASTELIN) 0.1 % nasal spray 2 sprays into the nostril(s) as directed by provider Daily As Needed for Rhinitis or Allergies. Use in each nostril as directed 30 mL 0   • Calcium 150 MG tablet Take  by mouth Daily.     • Cholecalciferol (VITAMIN D3) 1000 units capsule Take  by mouth.     • EPINEPHrine (EPIPEN) 0.3 MG/0.3ML solution auto-injector injection As Needed.     • fluticasone (FLONASE) 50 MCG/ACT nasal spray 2 sprays by Each Nare route Daily. Shake well before using.     • loratadine (CLARITIN) 10 MG tablet Take 1 tablet by mouth As Needed.     • metoprolol  "succinate XL (TOPROL-XL) 25 MG 24 hr tablet TAKE 1 TABLET BY MOUTH DAILY 90 tablet 1   • montelukast (SINGULAIR) 10 MG tablet Take 1 tablet by mouth As Needed.     • Phenylephrine HCl (SINEX REGULAR NA) into the nostril(s) as directed by provider As Needed.     • triamcinolone (KENALOG) 0.1 % cream      • Eliquis 5 MG tablet tablet TAKE 1 TABLET BY MOUTH EVERY 12 HOURS 180 tablet 1   • hydrOXYzine (ATARAX) 10 MG tablet Take 1 tablet by mouth 2 (Two) Times a Day As Needed for Anxiety. 60 tablet 2   • pantoprazole (PROTONIX) 40 MG EC tablet TAKE 1 TABLET BY MOUTH DAILY 90 tablet 1     No facility-administered medications prior to visit.     No opioid medication identified on active medication list. I have reviewed chart for other potential  high risk medication/s and harmful drug interactions in the elderly.      Aspirin is not on active medication list.  Aspirin use is not indicated based on review of current medical condition/s. Risk of harm outweighs potential benefits.  .    Patient Active Problem List   Diagnosis   • Essential hypertension   • OAB (overactive bladder)   • Vitamin D deficiency   • Generalized anxiety disorder   • Other fatigue   • Non-seasonal allergic rhinitis due to pollen   • Gastroesophageal reflux disease without esophagitis   • Paroxysmal atrial fibrillation     Advance Care Planning Advance Directive is not on file.  ACP discussion was held with the patient during this visit. Patient has an advance directive (not in EMR), copy requested.            Objective   Vitals:    11/26/24 1011   BP: 164/80   BP Location: Left arm   Patient Position: Sitting   Cuff Size: Large Adult   Pulse: 58   Temp: 97.7 °F (36.5 °C)   TempSrc: Oral   SpO2: 97%   Weight: 105 kg (231 lb)   Height: 165.1 cm (65\")   PainSc: 0-No pain       Estimated body mass index is 38.44 kg/m² as calculated from the following:    Height as of this encounter: 165.1 cm (65\").    Weight as of this encounter: 105 kg (231 lb).          "   Does the patient have evidence of cognitive impairment? No                                                                                                Health  Risk Assessment    Smoking Status:  Social History     Tobacco Use   Smoking Status Never   Smokeless Tobacco Never   Tobacco Comments    none     Alcohol Consumption:  Social History     Substance and Sexual Activity   Alcohol Use Not Currently       Fall Risk Screen  WILLIAMADI Fall Risk Assessment was completed, and patient is at LOW risk for falls.Assessment completed on:2024    Depression Screening   Little interest or pleasure in doing things? Not at all   Feeling down, depressed, or hopeless? Not at all   PHQ-2 Total Score 0      Health Habits and Functional and Cognitive Screenin/26/2024    10:15 AM   Functional & Cognitive Status   Do you have difficulty preparing food and eating? No   Do you have difficulty bathing yourself, getting dressed or grooming yourself? No   Do you have difficulty using the toilet? No   Do you have difficulty moving around from place to place? No   Do you have trouble with steps or getting out of a bed or a chair? No   Current Diet Well Balanced Diet   Dental Exam Up to date   Eye Exam Up to date   Exercise (times per week) 0 times per week   Current Exercises Include No Regular Exercise   Do you need help using the phone?  No   Are you deaf or do you have serious difficulty hearing?  No   Do you need help to go to places out of walking distance? No   Do you need help shopping? No   Do you need help preparing meals?  No   Do you need help with housework?  No   Do you need help with laundry? No   Do you need help taking your medications? No   Do you need help managing money? No   Do you ever drive or ride in a car without wearing a seat belt? No   Have you felt unusual stress, anger or loneliness in the last month? No   Who do you live with? Alone   If you need help, do you have trouble finding someone  available to you? No   Have you been bothered in the last four weeks by sexual problems? No   Do you have difficulty concentrating, remembering or making decisions? No           Age-appropriate Screening Schedule:  Refer to the list below for future screening recommendations based on patient's age, sex and/or medical conditions. Orders for these recommended tests are listed in the plan section. The patient has been provided with a written plan.    Health Maintenance List  Health Maintenance   Topic Date Due   • TDAP/TD VACCINES (1 - Tdap) Never done   • ZOSTER VACCINE (2 of 2) 12/29/2015   • COVID-19 Vaccine (3 - 2024-25 season) 11/28/2024 (Originally 9/1/2024)   • INFLUENZA VACCINE  03/31/2025 (Originally 7/1/2024)   • DXA SCAN  11/14/2025   • ANNUAL WELLNESS VISIT  11/26/2025   • BMI FOLLOWUP  11/26/2025   • MAMMOGRAM  02/13/2026   • COLORECTAL CANCER SCREENING  06/28/2033   • HEPATITIS C SCREENING  Completed   • Pneumococcal Vaccine 65+  Completed                                                                                                                                                CMS Preventative Services Quick Reference  Risk Factors Identified During Encounter  Immunizations Discussed/Encouraged: Tdap, Influenza, Shingrix, and COVID19    The above risks/problems have been discussed with the patient.  Pertinent information has been shared with the patient in the After Visit Summary.  An After Visit Summary and PPPS were made available to the patient.    Follow Up:   Next Medicare Wellness visit to be scheduled in 1 year.         Additional E&M Note during same encounter follows:  Patient has additional, significant, and separately identifiable condition(s)/problem(s) that require work above and beyond the Medicare Wellness Visit     Chief Complaint  Medicare Wellness-subsequent    Subjective   HPI  Bethanie is also being seen today for additional medical problem/s.    She is on metoprolol succinate for  "hypertension and rate control of a-fib.  She is on Eliquis for anticoagulation.  Her BP has been well controlled at home when she checks it (120-130s/70-80s). She was taking this prior to starting metoprolol for the a-fib.  No chest pain, palpitations, and shortness of breath.       She is on hydroxyzine as needed for anxiety.  Mood has been \"good.\"  Denies anxiety or panic attacks.     She is on pantoprazole for GERD.  S/p EGD and colonoscopy with Dr. Zayas 6/2023.  EGD showed mild gastritis.  Denies heartburn or indigestion.       She is on loratadine and montelukast for chronic allergies.  She follows with Dr. [facility].       She is on vitamin D supplementation for vit D deficiency.    Review of Systems   Constitutional:  Negative for chills, fatigue and fever.   HENT:  Positive for congestion and rhinorrhea. Negative for hearing loss.    Eyes:  Negative for pain and visual disturbance.   Respiratory:  Negative for cough and shortness of breath.    Cardiovascular:  Negative for chest pain and palpitations.   Gastrointestinal:  Negative for abdominal pain, constipation, diarrhea, nausea and vomiting.   Genitourinary:  Negative for difficulty urinating and dysuria.   Musculoskeletal:  Negative for arthralgias and myalgias.   Neurological:  Negative for weakness and numbness.   Psychiatric/Behavioral:  Negative for dysphoric mood and sleep disturbance. The patient is not nervous/anxious.               Objective   Vital Signs:  /80 (BP Location: Left arm, Patient Position: Sitting, Cuff Size: Large Adult)   Pulse 58   Temp 97.7 °F (36.5 °C) (Oral)   Ht 165.1 cm (65\")   Wt 105 kg (231 lb)   SpO2 97%   BMI 38.44 kg/m²   Physical Exam  Vitals reviewed.   Constitutional:       General: She is not in acute distress.     Appearance: Normal appearance. She is well-developed.   HENT:      Head: Normocephalic and atraumatic.      Right Ear: External ear normal.      Left Ear: External ear normal.      " Mouth/Throat:      Mouth: Mucous membranes are moist.   Eyes:      Extraocular Movements: Extraocular movements intact.      Conjunctiva/sclera: Conjunctivae normal.      Pupils: Pupils are equal, round, and reactive to light.   Cardiovascular:      Rate and Rhythm: Normal rate and regular rhythm.      Heart sounds: No murmur heard.  Pulmonary:      Effort: Pulmonary effort is normal.      Breath sounds: Normal breath sounds. No wheezing, rhonchi or rales.   Abdominal:      General: Bowel sounds are normal. There is no distension.      Palpations: Abdomen is soft.      Tenderness: There is no abdominal tenderness.   Musculoskeletal:         General: Normal range of motion.   Skin:     General: Skin is warm and dry.   Neurological:      Mental Status: She is alert and oriented to person, place, and time.      Deep Tendon Reflexes: Reflexes normal.   Psychiatric:         Mood and Affect: Mood and affect normal.         Behavior: Behavior normal.         Thought Content: Thought content normal.         Judgment: Judgment normal.     Lab Results   Component Value Date    GLUCOSE 96 11/19/2024    BUN 17 11/19/2024    CREATININE 0.78 11/19/2024    EGFR 81.3 11/19/2024    BCR 21.8 11/19/2024    K 3.8 11/19/2024    CO2 26.1 11/19/2024    CALCIUM 9.8 11/19/2024    ALBUMIN 4.4 04/15/2024    BILITOT 0.4 04/15/2024    AST 14 04/15/2024    ALT 13 04/15/2024       Lab Results   Component Value Date    CHOL 179 04/15/2024    CHLPL 173 04/20/2021    TRIG 95 04/15/2024    HDL 66 (H) 04/15/2024    LDL 96 04/15/2024       Lab Results   Component Value Date    WBC 6.34 04/15/2024    HGB 13.5 11/19/2024    HCT 40.5 11/19/2024    MCV 86.6 04/15/2024     04/15/2024                   Assessment and Plan            Annual physical exam  She is UTD on colonoscopy, last done 6/2023 and this showed polyp x1.  Ten-year repeat recommended.  Pap smear is no longer indicated by age and history.  She is up-to-date on mammogram, last done  2/2024 and this was normal.  She is UTD on DEXA, last done 11/2023 at [facility] and this showed osteopenia.  She is UTD on Pneumovax (10/2021), Prevnar-13 (10/2020) and Zostavax (11/2015).  She is due for COVID, Shingrix, Td and flu.  Flu and COVID declined.  Shingrix and Tdap can be done at the pharmacy.  She is UTD on routine labs.         Essential hypertension    Blood pressure has been running at goal.  Continue metoprolol succinate 25 mg daily.  Refills were not needed today.  Labs were not needed today.  Continue to monitor.       Paroxysmal atrial fibrillation  Stable metoprolol succinate 25 mg daily for rate control and Eliquis 5 mg twice daily for anticoagulation. Refills sent today as below. She is following with Cardiology at [facility]. Labs are reviewed and up-to-date.   Orders:  •  apixaban (Eliquis) 5 MG tablet tablet; Take 1 tablet by mouth Every 12 (Twelve) Hours.    Class 2 severe obesity due to excess calories with serious comorbidity and body mass index (BMI) of 38.0 to 38.9 in adult  Patient's (Body mass index is 38.44 kg/m².) indicates that they are morbidly/severely obese (BMI > 40 or > 35 with obesity - related health condition) with health conditions that include hypertension and GERD . Weight is worsening. BMI  is above average; BMI management plan is completed. We discussed portion control and increasing exercise.          Generalized anxiety disorder    Stable on hydroxyzine 10 mg twice daily as needed.  Refills were needed today.  Continue to monitor.  Orders:  •  hydrOXYzine (ATARAX) 10 MG tablet; Take 1 tablet by mouth 2 (Two) Times a Day As Needed for Anxiety.    Non-seasonal allergic rhinitis due to pollen  Following Dr. [place] [facility]. She is stable on Claritin and montelukast 10 mg daily. Also using azelastine nasal spray. No refills needed today.        Gastroesophageal reflux disease without esophagitis  Stable on pantoprazole 40 mg daily.  Refills were needed today.   Continue to monitor.  Wean PPI as able.  Orders:  •  pantoprazole (PROTONIX) 40 MG EC tablet; Take 1 tablet by mouth Daily.            Follow Up   Return in about 6 months (around 5/26/2025) for Recheck.  Patient was given instructions and counseling regarding her condition or for health maintenance advice. Please see specific information pulled into the AVS if appropriate.

## 2024-11-26 NOTE — ASSESSMENT & PLAN NOTE
Stable metoprolol succinate 25 mg daily for rate control and Eliquis 5 mg twice daily for anticoagulation. Refills sent today as below. She is following with Cardiology at [facility]. Labs are reviewed and up-to-date.   Orders:    apixaban (Eliquis) 5 MG tablet tablet; Take 1 tablet by mouth Every 12 (Twelve) Hours.

## 2024-11-26 NOTE — ASSESSMENT & PLAN NOTE
Stable on hydroxyzine 10 mg twice daily as needed.  Refills were needed today.  Continue to monitor.  Orders:    hydrOXYzine (ATARAX) 10 MG tablet; Take 1 tablet by mouth 2 (Two) Times a Day As Needed for Anxiety.

## 2024-11-26 NOTE — ASSESSMENT & PLAN NOTE
Blood pressure has been running at goal.  Continue metoprolol succinate 25 mg daily.  Refills were not needed today.  Labs were not needed today.  Continue to monitor.

## 2025-02-17 ENCOUNTER — TRANSCRIBE ORDERS (OUTPATIENT)
Dept: ADMINISTRATIVE | Facility: HOSPITAL | Age: 70
End: 2025-02-17
Payer: MEDICARE

## 2025-02-17 DIAGNOSIS — Z12.31 SCREENING MAMMOGRAM, ENCOUNTER FOR: Primary | ICD-10-CM

## 2025-03-09 DIAGNOSIS — I48.0 PAROXYSMAL ATRIAL FIBRILLATION: ICD-10-CM

## 2025-03-14 ENCOUNTER — OFFICE VISIT (OUTPATIENT)
Dept: FAMILY MEDICINE CLINIC | Age: 70
End: 2025-03-14
Payer: MEDICARE

## 2025-03-14 VITALS
SYSTOLIC BLOOD PRESSURE: 140 MMHG | OXYGEN SATURATION: 96 % | HEIGHT: 65 IN | TEMPERATURE: 97.9 F | BODY MASS INDEX: 38.79 KG/M2 | DIASTOLIC BLOOD PRESSURE: 51 MMHG | WEIGHT: 232.8 LBS | HEART RATE: 66 BPM

## 2025-03-14 DIAGNOSIS — J01.00 ACUTE NON-RECURRENT MAXILLARY SINUSITIS: Primary | ICD-10-CM

## 2025-03-14 PROCEDURE — 99213 OFFICE O/P EST LOW 20 MIN: CPT | Performed by: FAMILY MEDICINE

## 2025-03-14 PROCEDURE — 1126F AMNT PAIN NOTED NONE PRSNT: CPT | Performed by: FAMILY MEDICINE

## 2025-03-14 PROCEDURE — 3077F SYST BP >= 140 MM HG: CPT | Performed by: FAMILY MEDICINE

## 2025-03-14 PROCEDURE — 1159F MED LIST DOCD IN RCRD: CPT | Performed by: FAMILY MEDICINE

## 2025-03-14 PROCEDURE — 1160F RVW MEDS BY RX/DR IN RCRD: CPT | Performed by: FAMILY MEDICINE

## 2025-03-14 PROCEDURE — 3078F DIAST BP <80 MM HG: CPT | Performed by: FAMILY MEDICINE

## 2025-03-14 NOTE — PROGRESS NOTES
Bethanie Forte presents to [facility].    Chief Complaint:  Sinus issues    Subjective   History of Present Illness:  Bethanie is being seen today for evaluation of her sinuses.  She says that she has been dealing with right sided sinus pressure and congestion for the last 10 days roughly.  She says she usually does not have significant drainage with this type of issue.  She does have underlying allergy problems.  She has been using fluticasone and generic Singulair.  She denies fever or chills.    Review of Systems:  Review of Systems   Constitutional:  Negative for chills and fever.   HENT:  Positive for congestion and sinus pressure.    Respiratory:  Negative for cough and shortness of breath.    Cardiovascular:  Negative for chest pain and palpitations.   Gastrointestinal:  Negative for abdominal pain, nausea and vomiting.      Objective   Medical History:  Past Medical History:    Allergic    Anxiety    Hypertension    Vitamin D deficiency     Past Surgical History:    COLONOSCOPY    [place] over 10 years ago      Family History   Problem Relation Age of Onset    Restless legs syndrome Mother          in her 90s    Heart attack Father         dec age 46    Hypertension Sister     Coronary artery disease Brother     Hypertension Son     Breast cancer Maternal Aunt      Social History     Tobacco Use    Smoking status: Never    Smokeless tobacco: Never    Tobacco comments:     none   Substance Use Topics    Alcohol use: Not Currently       Health Maintenance Due   Topic Date Due    TDAP/TD VACCINES (1 - Tdap) Never done    ZOSTER VACCINE (2 of 2) 2015    COVID-19 Vaccine (3 -  season) 2024        Immunization History   Administered Date(s) Administered    COVID-19 (PFIZER) Purple Cap Monovalent 2021, 2021    Pneumococcal Conjugate 13-Valent (PCV13) 10/09/2020    Pneumococcal Polysaccharide (PPSV23) 10/20/2021    Shingrix 2015       No  "Known Allergies     Medications:    Current Outpatient Medications:     apixaban (Eliquis) 5 MG tablet tablet, Take 1 tablet by mouth Every 12 (Twelve) Hours., Disp: 180 tablet, Rfl: 1    azelastine (ASTELIN) 0.1 % nasal spray, 2 sprays into the nostril(s) as directed by provider Daily As Needed for Rhinitis or Allergies. Use in each nostril as directed, Disp: 30 mL, Rfl: 0    Calcium 150 MG tablet, Take  by mouth Daily., Disp: , Rfl:     Cholecalciferol (VITAMIN D3) 1000 units capsule, Take  by mouth., Disp: , Rfl:     EPINEPHrine (EPIPEN) 0.3 MG/0.3ML solution auto-injector injection, As Needed., Disp: , Rfl:     fluticasone (FLONASE) 50 MCG/ACT nasal spray, 2 sprays by Each Nare route Daily. Shake well before using., Disp: , Rfl:     hydrOXYzine (ATARAX) 10 MG tablet, Take 1 tablet by mouth 2 (Two) Times a Day As Needed for Anxiety., Disp: 60 tablet, Rfl: 2    loratadine (CLARITIN) 10 MG tablet, Take 1 tablet by mouth As Needed., Disp: , Rfl:     metoprolol succinate XL (TOPROL-XL) 25 MG 24 hr tablet, TAKE 1 TABLET BY MOUTH DAILY, Disp: 90 tablet, Rfl: 1    montelukast (SINGULAIR) 10 MG tablet, Take 1 tablet by mouth As Needed., Disp: , Rfl:     pantoprazole (PROTONIX) 40 MG EC tablet, Take 1 tablet by mouth Daily., Disp: 90 tablet, Rfl: 1    Phenylephrine HCl (SINEX REGULAR NA), into the nostril(s) as directed by provider As Needed., Disp: , Rfl:     triamcinolone (KENALOG) 0.1 % cream, , Disp: , Rfl:     amoxicillin-clavulanate (AUGMENTIN) 875-125 MG per tablet, Take 1 tablet by mouth 2 (Two) Times a Day., Disp: 20 tablet, Rfl: 0    Vital Signs:   Vitals:    03/14/25 1134 03/14/25 1155   BP: 141/53 140/51   BP Location: Right arm Right arm   Patient Position: Sitting Sitting   Pulse: 75 66   Temp: 97.9 °F (36.6 °C)    TempSrc: Oral    SpO2: 96%    Weight: 106 kg (232 lb 12.8 oz)    Height: 165.1 cm (65\")    Body mass index is 38.74 kg/m².    Physical Exam:  Physical Exam  Vitals and nursing note reviewed. "   Constitutional:       General: She is not in acute distress.     Appearance: She is not ill-appearing.   HENT:      Right Ear: Tympanic membrane and ear canal normal.      Left Ear: Tympanic membrane and ear canal normal.      Nose: No nasal deformity.      Right Sinus: Maxillary sinus tenderness present.      Mouth/Throat:      Mouth: Mucous membranes are moist.      Comments: Pharynx appears normal  Eyes:      Extraocular Movements: Extraocular movements intact.      Pupils: Pupils are equal, round, and reactive to light.   Neck:      Thyroid: No thyromegaly.   Cardiovascular:      Rate and Rhythm: Normal rate and regular rhythm.      Heart sounds: No murmur heard.  Pulmonary:      Effort: Pulmonary effort is normal.      Breath sounds: Normal breath sounds.   Abdominal:      General: There is no distension.      Palpations: Abdomen is soft. There is no mass.      Tenderness: There is no abdominal tenderness.   Musculoskeletal:      Cervical back: Normal range of motion.   Skin:     Findings: No lesion or rash.   Neurological:      General: No focal deficit present.      Mental Status: She is oriented to person, place, and time.      Cranial Nerves: No cranial nerve deficit.   Psychiatric:         Mood and Affect: Mood normal.     Result Review   The following data was reviewed by Rikki Epps MD on 03/14/2025.  Lab Results   Component Value Date    WBC 6.34 04/15/2024    HGB 13.5 11/19/2024    HCT 40.5 11/19/2024    MCV 86.6 04/15/2024     04/15/2024     Lab Results   Component Value Date    GLUCOSE 96 11/19/2024    BUN 17 11/19/2024    CREATININE 0.78 11/19/2024     11/19/2024    K 3.8 11/19/2024     11/19/2024    CALCIUM 9.8 11/19/2024    PROTEINTOT 7.1 04/15/2024    ALBUMIN 4.4 04/15/2024    ALT 13 04/15/2024    AST 14 04/15/2024    ALKPHOS 87 04/15/2024    BILITOT 0.4 04/15/2024    GLOB 2.7 04/15/2024    AGRATIO 1.6 04/15/2024    BCR 21.8 11/19/2024    ANIONGAP 7.9 11/19/2024     EGFR 81.3 11/19/2024     Lab Results   Component Value Date    CHOL 179 04/15/2024    CHLPL 173 04/20/2021    TRIG 95 04/15/2024    HDL 66 (H) 04/15/2024    LDL 96 04/15/2024     Lab Results   Component Value Date    TSH 1.430 05/17/2024     Lab Results   Component Value Date    HGBA1C 5.4 12/02/2023          Assessment and Plan:   Today, we have reviewed her care.  She probably has a sinus infection based on her symptoms and description of them.  She has previously had good success with Augmentin, and we will refill that for her.  Follow-up as needed.    Diagnoses and all orders for this visit:    1. Acute non-recurrent maxillary sinusitis (Primary)  -     amoxicillin-clavulanate (AUGMENTIN) 875-125 MG per tablet; Take 1 tablet by mouth 2 (Two) Times a Day.  Dispense: 20 tablet; Refill: 0    Follow Up  Return if symptoms worsen or fail to improve.  Patient was given instructions and counseling regarding her condition or for health maintenance advice. Please see specific information pulled into the AVS if appropriate.

## 2025-04-15 ENCOUNTER — HOSPITAL ENCOUNTER (OUTPATIENT)
Dept: MAMMOGRAPHY | Facility: HOSPITAL | Age: 70
Discharge: HOME | End: 2025-04-15
Payer: MEDICARE

## 2025-04-15 DIAGNOSIS — Z12.31 SCREENING MAMMOGRAM, ENCOUNTER FOR: ICD-10-CM

## 2025-04-15 PROCEDURE — 77067 SCR MAMMO BI INCL CAD: CPT

## 2025-04-15 PROCEDURE — 77063 BREAST TOMOSYNTHESIS BI: CPT

## 2025-04-16 ENCOUNTER — RESULTS FOLLOW-UP (OUTPATIENT)
Dept: ADMINISTRATIVE | Facility: HOSPITAL | Age: 70
End: 2025-04-16
Payer: MEDICARE

## 2025-05-14 ENCOUNTER — TELEPHONE (OUTPATIENT)
Dept: FAMILY MEDICINE CLINIC | Age: 70
End: 2025-05-14
Payer: MEDICARE

## 2025-05-14 DIAGNOSIS — I48.0 PAROXYSMAL ATRIAL FIBRILLATION: ICD-10-CM

## 2025-05-14 DIAGNOSIS — I10 ESSENTIAL HYPERTENSION: Primary | ICD-10-CM

## 2025-05-14 RX ORDER — METOPROLOL SUCCINATE 25 MG/1
25 TABLET, EXTENDED RELEASE ORAL DAILY
Qty: 90 TABLET | Refills: 1 | Status: SHIPPED | OUTPATIENT
Start: 2025-05-14

## 2025-05-14 NOTE — TELEPHONE ENCOUNTER
Caller: Bethanie Forte A    Relationship: Self    Best call back number:   Telephone Information:   Mobile [phone]        What orders are you requesting (i.e. lab or imaging): LABS    Additional notes: PRIOR TO APPOINTMENT 6/4    PLEASE CALL PATIENT WHEN ORDER IS IN

## 2025-05-19 ENCOUNTER — CLINICAL SUPPORT (OUTPATIENT)
Dept: FAMILY MEDICINE CLINIC | Age: 70
End: 2025-05-19
Payer: MEDICARE

## 2025-05-19 VITALS — DIASTOLIC BLOOD PRESSURE: 60 MMHG | HEART RATE: 61 BPM | SYSTOLIC BLOOD PRESSURE: 148 MMHG

## 2025-05-23 ENCOUNTER — OFFICE VISIT (OUTPATIENT)
Dept: FAMILY MEDICINE CLINIC | Age: 70
End: 2025-05-23
Payer: MEDICARE

## 2025-05-23 VITALS
HEART RATE: 58 BPM | OXYGEN SATURATION: 99 % | SYSTOLIC BLOOD PRESSURE: 140 MMHG | WEIGHT: 224.4 LBS | BODY MASS INDEX: 37.39 KG/M2 | RESPIRATION RATE: 18 BRPM | DIASTOLIC BLOOD PRESSURE: 48 MMHG | TEMPERATURE: 98 F | HEIGHT: 65 IN

## 2025-05-23 DIAGNOSIS — M54.6 ACUTE RIGHT-SIDED THORACIC BACK PAIN: Primary | ICD-10-CM

## 2025-05-23 DIAGNOSIS — I10 ESSENTIAL HYPERTENSION: ICD-10-CM

## 2025-05-23 RX ORDER — CYCLOBENZAPRINE HCL 5 MG
5 TABLET ORAL 2 TIMES DAILY PRN
Qty: 30 TABLET | Refills: 0 | Status: SHIPPED | OUTPATIENT
Start: 2025-05-23

## 2025-05-23 NOTE — ASSESSMENT & PLAN NOTE
Blood pressure elevated today at 145/61 initial, rechecked prior to leaving the office and it was 140/48. Continue taking metoprolol as directed and monitor bp at home.

## 2025-05-23 NOTE — PROGRESS NOTES
Chief Complaint     Back Pain (Right side middle back pain 10 days )    History of Present Illness     Bethanie Forte is a 69 y.o. female who presents to [facility].     Patient or patient representative verbalized consent for the use of Ambient Listening during the visit with  WHITNEY Juárez for chart documentation. 2025  13:59 EDT      History of Present Illness  The patient is a 69-year-old female who presents for evaluation of mid back pain.    She reports an incident on 05/15/2025 where she was repotting her plants and experienced a sensation akin to a muscle pull in her back. The following morning, she found it extremely difficult to rise from bed due to the onset of severe pain. The intensity of the pain escalated over the subsequent days, peaking on Monday and Tuesday. She describes the pain as dull and rates it as an 8 on a scale of 1 to 10. The pain is primarily localized to the right side of her mid back, with no reported exacerbating factors. She has been applying ice and Voltaren cream every couple of hours, which have provided some relief. She also took Tylenol as she is unable to tolerate ibuprofen. She has not previously used muscle relaxants and is seeking advice on appropriate stretching exercises.    Blood pressure elevated today at 145/61 initial, rechecked prior to leaving the office and it was 140/48.          History      Past Medical History:   Diagnosis Date    Allergic     Anxiety     Hypertension     Vitamin D deficiency        Past Surgical History:   Procedure Laterality Date    COLONOSCOPY      [facility] over 10 years ago       Family History   Problem Relation Age of Onset    Restless legs syndrome Mother          in her 90s    Heart attack Father         dec age 46    Hypertension Sister     Coronary artery disease Brother     Hypertension Son     Breast cancer Maternal Aunt         Current Medications        Current Outpatient  Medications:     apixaban (Eliquis) 5 MG tablet tablet, Take 1 tablet by mouth Every 12 (Twelve) Hours., Disp: 180 tablet, Rfl: 1    azelastine (ASTELIN) 0.1 % nasal spray, 2 sprays into the nostril(s) as directed by provider Daily As Needed for Rhinitis or Allergies. Use in each nostril as directed, Disp: 30 mL, Rfl: 0    Calcium 150 MG tablet, Take  by mouth Daily., Disp: , Rfl:     Cholecalciferol (VITAMIN D3) 1000 units capsule, Take  by mouth., Disp: , Rfl:     EPINEPHrine (EPIPEN) 0.3 MG/0.3ML solution auto-injector injection, As Needed., Disp: , Rfl:     fluticasone (FLONASE) 50 MCG/ACT nasal spray, 2 sprays by Each Nare route Daily. Shake well before using., Disp: , Rfl:     hydrOXYzine (ATARAX) 10 MG tablet, Take 1 tablet by mouth 2 (Two) Times a Day As Needed for Anxiety., Disp: 60 tablet, Rfl: 2    loratadine (CLARITIN) 10 MG tablet, Take 1 tablet by mouth As Needed., Disp: , Rfl:     metoprolol succinate XL (TOPROL-XL) 25 MG 24 hr tablet, TAKE 1 TABLET BY MOUTH DAILY, Disp: 90 tablet, Rfl: 1    montelukast (SINGULAIR) 10 MG tablet, Take 1 tablet by mouth As Needed., Disp: , Rfl:     pantoprazole (PROTONIX) 40 MG EC tablet, Take 1 tablet by mouth Daily., Disp: 90 tablet, Rfl: 1    triamcinolone (KENALOG) 0.1 % cream, , Disp: , Rfl:     cyclobenzaprine (FLEXERIL) 5 MG tablet, Take 1 tablet by mouth 2 (Two) Times a Day As Needed for Muscle Spasms., Disp: 30 tablet, Rfl: 0     Allergies     No Known Allergies    Social History       Social History     Social History Narrative    Not on file       Immunizations     Immunization:  Immunization History   Administered Date(s) Administered    COVID-19 (PFIZER) Purple Cap Monovalent 03/08/2021, 03/29/2021    Pneumococcal Conjugate 13-Valent (PCV13) 10/09/2020    Pneumococcal Polysaccharide (PPSV23) 10/20/2021    Shingrix 11/03/2015          Objective     Objective     Vital Signs:   /48 (BP Location: Left arm, Patient Position: Sitting, Cuff Size: Adult)    "Pulse 58   Temp 98 °F (36.7 °C) (Oral)   Resp 18   Ht 165.1 cm (65\")   Wt 102 kg (224 lb 6.4 oz)   SpO2 99% Comment: room air  BMI 37.34 kg/m²       Physical Exam  Vitals and nursing note reviewed.   Constitutional:       Appearance: Normal appearance. She is obese.   HENT:      Head: Normocephalic.   Eyes:      Conjunctiva/sclera: Conjunctivae normal.      Pupils: Pupils are equal, round, and reactive to light.   Cardiovascular:      Rate and Rhythm: Regular rhythm. Bradycardia present.      Pulses: Normal pulses.      Heart sounds: Normal heart sounds.   Pulmonary:      Effort: Pulmonary effort is normal.      Breath sounds: Normal breath sounds.   Abdominal:      General: Bowel sounds are normal.      Palpations: Abdomen is soft.   Musculoskeletal:         General: Normal range of motion.      Cervical back: Normal range of motion and neck supple.      Thoracic back: Tenderness present.      Comments: Pain on the right side of the thoracic spine.   Skin:     General: Skin is warm and dry.   Neurological:      General: No focal deficit present.      Mental Status: She is alert and oriented to person, place, and time.   Psychiatric:         Attention and Perception: Attention normal.         Mood and Affect: Mood and affect normal.         Behavior: Behavior normal. Behavior is cooperative.         Physical Exam  Respiratory: Clear to auscultation, no wheezing, rales or rhonchi  Cardiovascular: Regular rate and rhythm, no murmurs, rubs, or gallops  Musculoskeletal: Tenderness noted in the mid back, primarily on the right side. No tenderness in other areas of the back.      Results    The following data was reviewed by: WHITNEY Juárez on 05/23/25               Results           Assessment and Plan        Assessment and Plan       Acute right-sided thoracic back pain    Orders:    cyclobenzaprine (FLEXERIL) 5 MG tablet; Take 1 tablet by mouth 2 (Two) Times a Day As Needed for Muscle Spasms.  Print out of " stretches given  Essential hypertension    Blood pressure elevated today at 145/61 initial, rechecked prior to leaving the office and it was 140/48. Continue taking metoprolol as directed and monitor bp at home.             Assessment & Plan  1. Mid back pain.  - Reports mid back pain that began last Thursday after repotting plants. Pain severity reached an 8 out of 10.  - Managed with ice, Voltaren cream, and Tylenol. Physical exam reveals soreness and numbness on the right side of the mid back.  - Advised to continue using ice and heat as needed, along with diclofenac cream and Tylenol. Discussion included the potential sedative effects of Flexeril and the importance of avoiding activities requiring alertness while on this medication.  - Prescription for Flexeril 5 mg provided, to be taken at bedtime. Advised to keep floors clear of tripping hazards to prevent falls. Gradual incorporation of stretching exercises recommended after 2 weeks from the onset of the injury. Physical therapy will be considered if there is no improvement.    Follow-up  The patient will follow up on 06/04/2025 with Dr. Montano.        Follow Up        Follow Up   Return for With PCP, Next scheduled follow up, sooner if condition worsens.  Patient was given instructions and counseling regarding her condition or for health maintenance advice. Please see specific information pulled into the AVS if appropriate.

## 2025-05-27 ENCOUNTER — LAB (OUTPATIENT)
Dept: LAB | Facility: HOSPITAL | Age: 70
End: 2025-05-27
Payer: MEDICARE

## 2025-05-27 DIAGNOSIS — I48.0 PAROXYSMAL ATRIAL FIBRILLATION: ICD-10-CM

## 2025-05-27 DIAGNOSIS — K21.9 GASTROESOPHAGEAL REFLUX DISEASE WITHOUT ESOPHAGITIS: ICD-10-CM

## 2025-05-27 DIAGNOSIS — I10 ESSENTIAL HYPERTENSION: ICD-10-CM

## 2025-05-27 LAB
ALBUMIN SERPL-MCNC: 4.2 G/DL (ref 3.5–5.2)
ALBUMIN/GLOB SERPL: 1.7 G/DL
ALP SERPL-CCNC: 77 U/L (ref 39–117)
ALT SERPL W P-5'-P-CCNC: 14 U/L (ref 1–33)
ANION GAP SERPL CALCULATED.3IONS-SCNC: 9.1 MMOL/L (ref 5–15)
AST SERPL-CCNC: 15 U/L (ref 1–32)
BILIRUB SERPL-MCNC: 0.4 MG/DL (ref 0–1.2)
BUN SERPL-MCNC: 15 MG/DL (ref 8–23)
BUN/CREAT SERPL: 18.3 (ref 7–25)
CALCIUM SPEC-SCNC: 9.9 MG/DL (ref 8.6–10.5)
CHLORIDE SERPL-SCNC: 107 MMOL/L (ref 98–107)
CHOLEST SERPL-MCNC: 171 MG/DL (ref 0–200)
CO2 SERPL-SCNC: 26.9 MMOL/L (ref 22–29)
CREAT SERPL-MCNC: 0.82 MG/DL (ref 0.57–1)
EGFRCR SERPLBLD CKD-EPI 2021: 76.6 ML/MIN/1.73
GLOBULIN UR ELPH-MCNC: 2.5 GM/DL
GLUCOSE SERPL-MCNC: 96 MG/DL (ref 65–99)
HCT VFR BLD AUTO: 40.8 % (ref 34–46.6)
HDLC SERPL-MCNC: 60 MG/DL (ref 40–60)
HGB BLD-MCNC: 13.6 G/DL (ref 12–15.9)
LDLC SERPL CALC-MCNC: 94 MG/DL (ref 0–100)
LDLC/HDLC SERPL: 1.54 {RATIO}
POTASSIUM SERPL-SCNC: 4.2 MMOL/L (ref 3.5–5.2)
PROT SERPL-MCNC: 6.7 G/DL (ref 6–8.5)
SODIUM SERPL-SCNC: 143 MMOL/L (ref 136–145)
TRIGL SERPL-MCNC: 92 MG/DL (ref 0–150)
VLDLC SERPL-MCNC: 17 MG/DL (ref 5–40)

## 2025-05-27 PROCEDURE — 85014 HEMATOCRIT: CPT

## 2025-05-27 PROCEDURE — 80061 LIPID PANEL: CPT

## 2025-05-27 PROCEDURE — 85018 HEMOGLOBIN: CPT

## 2025-05-27 PROCEDURE — 36415 COLL VENOUS BLD VENIPUNCTURE: CPT

## 2025-05-27 PROCEDURE — 80053 COMPREHEN METABOLIC PANEL: CPT

## 2025-05-27 RX ORDER — PANTOPRAZOLE SODIUM 40 MG/1
40 TABLET, DELAYED RELEASE ORAL DAILY
Qty: 90 TABLET | Refills: 1 | Status: SHIPPED | OUTPATIENT
Start: 2025-05-27

## 2025-05-28 ENCOUNTER — RESULTS FOLLOW-UP (OUTPATIENT)
Dept: FAMILY MEDICINE CLINIC | Age: 70
End: 2025-05-28
Payer: MEDICARE

## 2025-06-04 ENCOUNTER — OFFICE VISIT (OUTPATIENT)
Dept: FAMILY MEDICINE CLINIC | Age: 70
End: 2025-06-04
Payer: MEDICARE

## 2025-06-04 VITALS
HEIGHT: 65 IN | WEIGHT: 229 LBS | OXYGEN SATURATION: 90 % | DIASTOLIC BLOOD PRESSURE: 78 MMHG | BODY MASS INDEX: 38.15 KG/M2 | TEMPERATURE: 97.9 F | SYSTOLIC BLOOD PRESSURE: 139 MMHG | HEART RATE: 66 BPM

## 2025-06-04 DIAGNOSIS — I10 ESSENTIAL HYPERTENSION: Primary | ICD-10-CM

## 2025-06-04 DIAGNOSIS — E66.01 CLASS 2 SEVERE OBESITY DUE TO EXCESS CALORIES WITH SERIOUS COMORBIDITY AND BODY MASS INDEX (BMI) OF 38.0 TO 38.9 IN ADULT: ICD-10-CM

## 2025-06-04 DIAGNOSIS — I48.0 PAROXYSMAL ATRIAL FIBRILLATION: ICD-10-CM

## 2025-06-04 DIAGNOSIS — E66.812 CLASS 2 SEVERE OBESITY DUE TO EXCESS CALORIES WITH SERIOUS COMORBIDITY AND BODY MASS INDEX (BMI) OF 38.0 TO 38.9 IN ADULT: ICD-10-CM

## 2025-06-04 DIAGNOSIS — F41.1 GENERALIZED ANXIETY DISORDER: ICD-10-CM

## 2025-06-04 RX ORDER — HYDROXYZINE HYDROCHLORIDE 10 MG/1
10 TABLET, FILM COATED ORAL 2 TIMES DAILY PRN
Qty: 60 TABLET | Refills: 2 | Status: SHIPPED | OUTPATIENT
Start: 2025-06-04

## 2025-06-04 NOTE — ASSESSMENT & PLAN NOTE
Stable metoprolol succinate 25 mg daily for rate control and Eliquis 5 mg twice daily for anticoagulation. Refills sent today as below. She is following with Cardiology at [facility]. Labs are reviewed and up-to-date.

## 2025-06-04 NOTE — ASSESSMENT & PLAN NOTE
Blood pressure has been running at goal.  Continue metoprolol succinate 25 mg daily daily.  Refills were not needed today.  Labs were not needed today; reviewed today.  Continue to monitor.

## 2025-06-04 NOTE — PROGRESS NOTES
"Chief Complaint  Hypertension    Subjective          Bethanie Forte presents to [facility] today for follow-up of routine issues.    She is on metoprolol succinate for HTN and atrial fib.  She is on Eliquis for anticoagulation.  She has previously been on lisinopril (no longer needed).  She does monitor BP at home and keeps a log.  She follows with [facility] every 6 months.  She has been cutting out sugars and snacks.  She is down 3 lbs since March.       She is on hydroxyzine as needed for anxiety.  Mood has been \"okay.\"       She is on pantoprazole for GERD.  S/p EGD and colonoscopy with Dr. Zayas 6/2023 showed mild gastritis.       She is on loratadine and montelukast for chronic allergies.  She follows with Dr. [facility].  She has had some rhinorrhea and congestion.        She is on vitamin D supplementation for vit D deficiency.      Current Outpatient Medications:     apixaban (Eliquis) 5 MG tablet tablet, Take 1 tablet by mouth Every 12 (Twelve) Hours., Disp: 180 tablet, Rfl: 1    azelastine (ASTELIN) 0.1 % nasal spray, 2 sprays into the nostril(s) as directed by provider Daily As Needed for Rhinitis or Allergies. Use in each nostril as directed, Disp: 30 mL, Rfl: 0    Calcium 150 MG tablet, Take  by mouth Daily., Disp: , Rfl:     Cholecalciferol (VITAMIN D3) 1000 units capsule, Take  by mouth., Disp: , Rfl:     cyclobenzaprine (FLEXERIL) 5 MG tablet, Take 1 tablet by mouth 2 (Two) Times a Day As Needed for Muscle Spasms., Disp: 30 tablet, Rfl: 0    EPINEPHrine (EPIPEN) 0.3 MG/0.3ML solution auto-injector injection, As Needed., Disp: , Rfl:     fluticasone (FLONASE) 50 MCG/ACT nasal spray, 2 sprays by Each Nare route Daily. Shake well before using., Disp: , Rfl:     hydrOXYzine (ATARAX) 10 MG tablet, Take 1 tablet by mouth 2 (Two) Times a Day As Needed for Anxiety., Disp: 60 tablet, Rfl: 2    loratadine (CLARITIN) 10 MG tablet, Take 1 tablet by mouth As " "Needed., Disp: , Rfl:     metoprolol succinate XL (TOPROL-XL) 25 MG 24 hr tablet, TAKE 1 TABLET BY MOUTH DAILY, Disp: 90 tablet, Rfl: 1    montelukast (SINGULAIR) 10 MG tablet, Take 1 tablet by mouth As Needed., Disp: , Rfl:     pantoprazole (PROTONIX) 40 MG EC tablet, TAKE 1 TABLET BY MOUTH DAILY, Disp: 90 tablet, Rfl: 1    triamcinolone (KENALOG) 0.1 % cream, , Disp: , Rfl:   Medications Discontinued During This Encounter   Medication Reason    hydrOXYzine (ATARAX) 10 MG tablet Reorder         Allergies:  Patient has no known allergies.      Objective   Vital Signs:   Vitals:    06/04/25 1002   BP: 139/78   BP Location: Left arm   Patient Position: Sitting   Cuff Size: Adult   Pulse: 66   Temp: 97.9 °F (36.6 °C)   TempSrc: Oral   SpO2: 90%   Weight: 104 kg (229 lb)   Height: 165.1 cm (65\")     Body mass index is 38.11 kg/m².           Physical Exam  Vitals reviewed.   Constitutional:       General: She is not in acute distress.     Appearance: Normal appearance. She is well-developed.   HENT:      Head: Normocephalic and atraumatic.      Right Ear: External ear normal.      Left Ear: External ear normal.   Eyes:      Extraocular Movements: Extraocular movements intact.      Conjunctiva/sclera: Conjunctivae normal.      Pupils: Pupils are equal, round, and reactive to light.   Cardiovascular:      Rate and Rhythm: Normal rate. Rhythm irregularly irregular.      Heart sounds: No murmur heard.  Pulmonary:      Effort: Pulmonary effort is normal.      Breath sounds: Normal breath sounds. No wheezing, rhonchi or rales.   Abdominal:      General: Bowel sounds are normal. There is no distension.      Palpations: Abdomen is soft.      Tenderness: There is no abdominal tenderness.   Musculoskeletal:         General: Normal range of motion.   Neurological:      Mental Status: She is alert.   Psychiatric:         Mood and Affect: Affect normal.           Lab Results   Component Value Date    GLUCOSE 96 05/27/2025    BUN 15.0 " 05/27/2025    CREATININE 0.82 05/27/2025    EGFRIFNONA 85 02/08/2022    EGFRIFAFRI 90 03/27/2018    BCR 18.3 05/27/2025    K 4.2 05/27/2025    CO2 26.9 05/27/2025    CALCIUM 9.9 05/27/2025    ALBUMIN 4.2 05/27/2025    AST 15 05/27/2025    ALT 14 05/27/2025       Lab Results   Component Value Date    CHOL 171 05/27/2025    CHLPL 173 04/20/2021    TRIG 92 05/27/2025    HDL 60 05/27/2025    LDL 94 05/27/2025       Lab Results   Component Value Date    WBC 6.34 04/15/2024    HGB 13.6 05/27/2025    HCT 40.8 05/27/2025    MCV 86.6 04/15/2024     04/15/2024            Assessment and Plan    Assessment & Plan  Essential hypertension    Blood pressure has been running at goal.  Continue metoprolol succinate 25 mg daily daily.  Refills were not needed today.  Labs were not needed today; reviewed today.  Continue to monitor.       Paroxysmal atrial fibrillation  Stable metoprolol succinate 25 mg daily for rate control and Eliquis 5 mg twice daily for anticoagulation. Refills sent today as below. She is following with Cardiology at [facility]. Labs are reviewed and up-to-date.        Generalized anxiety disorder    Stable on hydroxyzine 10 mg 2x daily prn.  Refills were needed today.  Continue to monitor.  Orders:    hydrOXYzine (ATARAX) 10 MG tablet; Take 1 tablet by mouth 2 (Two) Times a Day As Needed for Anxiety.    Class 2 severe obesity due to excess calories with serious comorbidity and body mass index (BMI) of 38.0 to 38.9 in adult  Patient's (Body mass index is 38.11 kg/m².) indicates that they are morbidly/severely obese (BMI > 40 or > 35 with obesity - related health condition) with health conditions that include hypertension and GERD . Weight is improving with lifestyle modifications. BMI  is above average; BMI management plan is completed. We discussed portion control and increasing exercise.                    Follow Up   Return in about 6 months (around 12/4/2025) for Medicare Wellness.  Patient was given  instructions and counseling regarding her condition or for health maintenance advice. Please see specific information pulled into the AVS if appropriate.           06/04/2025

## 2025-06-04 NOTE — ASSESSMENT & PLAN NOTE
Stable on hydroxyzine 10 mg 2x daily prn.  Refills were needed today.  Continue to monitor.  Orders:    hydrOXYzine (ATARAX) 10 MG tablet; Take 1 tablet by mouth 2 (Two) Times a Day As Needed for Anxiety.

## 2025-06-17 ENCOUNTER — READMISSION MANAGEMENT (OUTPATIENT)
Dept: CALL CENTER | Facility: HOSPITAL | Age: 70
End: 2025-06-17
Payer: MEDICARE

## 2025-06-17 NOTE — OUTREACH NOTE
Prep Survey      Flowsheet Row Responses   [facility] facility patient discharged from? Non-BH   Is LACE score < 7 ? Non-BH Discharge   Eligibility [facility]   Date of Admission 06/17/25   Date of Discharge 06/17/25   Discharge Disposition Home or Self Care   Discharge diagnosis Atrial fibrillation with rapid ventricular response (HCC) (Primary Dx),  Hypokalemia   Does the patient have one of the following disease processes/diagnoses(primary or secondary)? Other   Prep survey completed? Yes            Teresa LOPEZ - Registered Nurse

## 2025-06-18 ENCOUNTER — TELEPHONE (OUTPATIENT)
Dept: FAMILY MEDICINE CLINIC | Age: 70
End: 2025-06-18
Payer: MEDICARE

## 2025-06-18 ENCOUNTER — TRANSITIONAL CARE MANAGEMENT TELEPHONE ENCOUNTER (OUTPATIENT)
Dept: CALL CENTER | Facility: HOSPITAL | Age: 70
End: 2025-06-18
Payer: MEDICARE

## 2025-06-18 NOTE — TELEPHONE ENCOUNTER
Patient admitted into [facility] same day discharge 6/17/25. Patient was in for AFIB.  Next available appt not until July. Please advise for a sooner appt. Did not schedule anything at this time.

## 2025-06-18 NOTE — OUTREACH NOTE
Call Center TCM Note      Flowsheet Row Responses   [facility] patient discharged from? Non-BH  [[place]]   Does the patient have one of the following disease processes/diagnoses(primary or secondary)? Other   TCM attempt successful? Yes   Call start time 0952   Call end time 0959   Discharge diagnosis Atrial fibrillation with rapid ventricular response (HCC) (Primary Dx),  Hypokalemia   Person spoke with today (if not patient) and relationship Patient   Meds reviewed with patient/caregiver? Yes   Does the patient have all medications ordered at discharge? Yes   Prescription comments amiodarone added 200mg   Comments Will see Dr. Calle at [facility].   Has home health visited the patient within 72 hours of discharge? N/A   Psychosocial issues? No   What is the patient's perception of their health status since discharge? Improving   Is the patient/caregiver able to teach back signs and symptoms related to disease process for when to call PCP? Yes   Is the patient/caregiver able to teach back signs and symptoms related to disease process for when to call 911? Yes   Is the patient/caregiver able to teach back the hierarchy of who to call/visit for symptoms/problems? PCP, Specialist, Home health nurse, Urgent Care, ED, 911 Yes   Additional teach back comments Back in to Sinus rhythm at [facility].   TCM call completed? Yes   Wrap up additional comments Patient reports doing well. Sees cards July 1st. No other concerns or questions noted.   Call end time 0959   Would this patient benefit from a Referral to Amb Social Work? No   Is the patient interested in additional calls from an ambulatory ? No            Chanel ACEVEDO - Registered Nurse    6/18/2025, 10:02 EDT

## 2025-06-20 NOTE — TELEPHONE ENCOUNTER
Yes please call and see if she wants to make a TCM appt.  If she does please set her up with first available appt with any provider.

## 2025-06-25 ENCOUNTER — OFFICE VISIT (OUTPATIENT)
Dept: FAMILY MEDICINE CLINIC | Age: 70
End: 2025-06-25
Payer: MEDICARE

## 2025-06-25 ENCOUNTER — LAB (OUTPATIENT)
Dept: LAB | Facility: HOSPITAL | Age: 70
End: 2025-06-25
Payer: MEDICARE

## 2025-06-25 VITALS
DIASTOLIC BLOOD PRESSURE: 68 MMHG | BODY MASS INDEX: 37.99 KG/M2 | SYSTOLIC BLOOD PRESSURE: 140 MMHG | HEIGHT: 65 IN | WEIGHT: 228 LBS | OXYGEN SATURATION: 98 % | TEMPERATURE: 97.4 F | HEART RATE: 60 BPM

## 2025-06-25 DIAGNOSIS — E87.6 HYPOKALEMIA: Primary | ICD-10-CM

## 2025-06-25 DIAGNOSIS — R79.89 ELEVATED BRAIN NATRIURETIC PEPTIDE (BNP) LEVEL: ICD-10-CM

## 2025-06-25 DIAGNOSIS — E87.6 HYPOKALEMIA: ICD-10-CM

## 2025-06-25 DIAGNOSIS — I48.0 PAROXYSMAL ATRIAL FIBRILLATION: ICD-10-CM

## 2025-06-25 DIAGNOSIS — R06.00 DYSPNEA, UNSPECIFIED TYPE: ICD-10-CM

## 2025-06-25 LAB
ANION GAP SERPL CALCULATED.3IONS-SCNC: 9.4 MMOL/L (ref 5–15)
BUN SERPL-MCNC: 14 MG/DL (ref 8–23)
BUN/CREAT SERPL: 17.7 (ref 7–25)
CALCIUM SPEC-SCNC: 9.9 MG/DL (ref 8.6–10.5)
CHLORIDE SERPL-SCNC: 105 MMOL/L (ref 98–107)
CO2 SERPL-SCNC: 26.6 MMOL/L (ref 22–29)
CREAT SERPL-MCNC: 0.79 MG/DL (ref 0.57–1)
EGFRCR SERPLBLD CKD-EPI 2021: 80.1 ML/MIN/1.73
GLUCOSE SERPL-MCNC: 99 MG/DL (ref 65–99)
NT-PROBNP SERPL-MCNC: 339 PG/ML (ref 0–900)
POTASSIUM SERPL-SCNC: 4.2 MMOL/L (ref 3.5–5.2)
SODIUM SERPL-SCNC: 141 MMOL/L (ref 136–145)

## 2025-06-25 PROCEDURE — 1159F MED LIST DOCD IN RCRD: CPT | Performed by: NURSE PRACTITIONER

## 2025-06-25 PROCEDURE — 36415 COLL VENOUS BLD VENIPUNCTURE: CPT

## 2025-06-25 PROCEDURE — 1126F AMNT PAIN NOTED NONE PRSNT: CPT | Performed by: NURSE PRACTITIONER

## 2025-06-25 PROCEDURE — 99213 OFFICE O/P EST LOW 20 MIN: CPT | Performed by: NURSE PRACTITIONER

## 2025-06-25 PROCEDURE — 1160F RVW MEDS BY RX/DR IN RCRD: CPT | Performed by: NURSE PRACTITIONER

## 2025-06-25 PROCEDURE — 3077F SYST BP >= 140 MM HG: CPT | Performed by: NURSE PRACTITIONER

## 2025-06-25 PROCEDURE — 3078F DIAST BP <80 MM HG: CPT | Performed by: NURSE PRACTITIONER

## 2025-06-25 PROCEDURE — 80048 BASIC METABOLIC PNL TOTAL CA: CPT

## 2025-06-25 PROCEDURE — 83880 ASSAY OF NATRIURETIC PEPTIDE: CPT | Performed by: NURSE PRACTITIONER

## 2025-06-25 RX ORDER — AMIODARONE HYDROCHLORIDE 200 MG/1
200 TABLET ORAL DAILY
COMMUNITY
Start: 2025-06-18 | End: 2025-07-18

## 2025-06-25 NOTE — PROGRESS NOTES
"Chief Complaint  Transitional Care Management ([facility] inpt f/u from 6/17 for Atrial fibrillation with rapid ventricular response (HCC) (Primary Dx); Hypokalemia )    Subjective          Bethanie Forte presents to [facility]     Patient is a 69-year-old female who is here today to follow-up regarding recent emergency room visit June 17 at [facility].  Went right before midnight due to awaking from sleep at home and feeling like her heart was racing.  Already on metoprolol and Eliquis for atrial fibrillation.  She received a Cardizem bolus and amiodarone drip and was sent home around 2 or 3 PM the next day.  Cardiologist Dr. Claros came to see her while at [facility] and discharged her to home on amiodarone 200 mg once daily.  She is seeing her usual cardiologist Leeann Winters on July 1.  She is a little ambivalent about being on amiodarone due to potential side effects but is tolerating the medication without difficulty and states she feels good.  No other episodes of heart racing have been experienced.  Of note she had a mildly elevated BNP level at [facility] and potassium was mildly low at 3.3.  We will recheck both of these values today.  Patient reports shortness of breath only with climbing stairs and denies edema.  She does monitor her blood pressure at home and reports that it usually runs higher in the medical office than it does at home.  Other labs collected [facility] including thyroid-stimulating hormone and magnesium were within normal limits     Objective   Vital Signs:   Vitals:    06/25/25 1057 06/25/25 1131   BP: 149/63 140/68   BP Location: Left arm Left arm   Patient Position: Sitting Sitting   Cuff Size: Adult Large Adult   Pulse: 60    Temp: 97.4 °F (36.3 °C)    TempSrc: Temporal    SpO2: 98%    Weight: 103 kg (228 lb)    Height: 165.1 cm (65\")        Wt Readings from Last 3 Encounters:   06/25/25 103 kg (228 lb)   06/04/25 104 kg (229 lb)   05/23/25 102 kg (224 lb 6.4 " oz)      BP Readings from Last 3 Encounters:   06/25/25 140/68   06/04/25 139/78   05/23/25 140/48       Body mass index is 37.94 kg/m².             Physical Exam  Vitals reviewed.   Constitutional:       General: She is not in acute distress.     Appearance: Normal appearance. She is well-developed. She is obese.   Neck:      Thyroid: No thyromegaly.      Vascular: No carotid bruit.   Cardiovascular:      Rate and Rhythm: Normal rate and regular rhythm.      Heart sounds: Normal heart sounds.   Pulmonary:      Effort: Pulmonary effort is normal.      Breath sounds: Normal breath sounds.   Musculoskeletal:      Right lower leg: No edema.      Left lower leg: No edema.   Skin:     General: Skin is warm and dry.   Neurological:      General: No focal deficit present.      Mental Status: She is alert.   Psychiatric:         Attention and Perception: Attention normal.         Mood and Affect: Mood and affect normal.         Behavior: Behavior normal.           Current Outpatient Medications:     amiodarone (PACERONE) 200 MG tablet, Take 1 tablet by mouth Daily., Disp: , Rfl:     apixaban (Eliquis) 5 MG tablet tablet, Take 1 tablet by mouth Every 12 (Twelve) Hours., Disp: 180 tablet, Rfl: 1    Calcium 150 MG tablet, Take  by mouth Daily., Disp: , Rfl:     Cholecalciferol (VITAMIN D3) 1000 units capsule, Take  by mouth., Disp: , Rfl:     cyclobenzaprine (FLEXERIL) 5 MG tablet, Take 1 tablet by mouth 2 (Two) Times a Day As Needed for Muscle Spasms., Disp: 30 tablet, Rfl: 0    EPINEPHrine (EPIPEN) 0.3 MG/0.3ML solution auto-injector injection, As Needed., Disp: , Rfl:     fluticasone (FLONASE) 50 MCG/ACT nasal spray, 2 sprays by Each Nare route Daily. Shake well before using., Disp: , Rfl:     hydrOXYzine (ATARAX) 10 MG tablet, Take 1 tablet by mouth 2 (Two) Times a Day As Needed for Anxiety., Disp: 60 tablet, Rfl: 2    loratadine (CLARITIN) 10 MG tablet, Take 1 tablet by mouth As Needed., Disp: , Rfl:     metoprolol  succinate XL (TOPROL-XL) 25 MG 24 hr tablet, TAKE 1 TABLET BY MOUTH DAILY, Disp: 90 tablet, Rfl: 1    montelukast (SINGULAIR) 10 MG tablet, Take 1 tablet by mouth As Needed., Disp: , Rfl:     pantoprazole (PROTONIX) 40 MG EC tablet, TAKE 1 TABLET BY MOUTH DAILY, Disp: 90 tablet, Rfl: 1    triamcinolone (KENALOG) 0.1 % cream, , Disp: , Rfl:    Past Medical History:   Diagnosis Date    Allergic     Anxiety     Hypertension     Vitamin D deficiency      No Known Allergies            Result Review :     Common labs          11/19/2024    07:48 5/27/2025    08:50   Common Labs   Glucose 96  96    BUN 17  15.0    Creatinine 0.78  0.82    Sodium 140  143    Potassium 3.8  4.2    Chloride 106  107    Calcium 9.8  9.9    Albumin  4.2    Total Bilirubin  0.4    Alkaline Phosphatase  77    AST (SGOT)  15    ALT (SGPT)  14    Hemoglobin 13.5  13.6    Hematocrit 40.5  40.8    Total Cholesterol  171    Triglycerides  92    HDL Cholesterol  60    LDL Cholesterol   94         XR Chest 1 View  Result Date: 6/17/2025  Mild increased interstitial markings throughout. Images reviewed, interpreted, and dictated by Dr. Andrew Carcamo. Transcribed by Carol iKng PA-C.    Mammo Screening Digital Tomosynthesis Bilateral With CAD  Result Date: 4/15/2025  No mammographic evidence of malignancy.  Recommend annual screening mammography.  BI-RADS ASSESSMENT: Category 2: Benign  Note: It has been reported that there is approximately a 15% false negative rate in mammography.  Therefore, management of a palpable abnormality should not be deferred because of a negative mammogram.  4/15/2025 2:43 PM by Ingrid Perez MD on Workstation: [id number]               Social History     Tobacco Use   Smoking Status Never   Smokeless Tobacco Never   Tobacco Comments    none           Assessment and Plan    Diagnoses and all orders for this visit:    1. Hypokalemia (Primary)  -     Basic metabolic panel; Future    2. Elevated brain natriuretic peptide  (BNP) level  -     proBNP    3. Dyspnea, unspecified type  -     proBNP    4. Paroxysmal atrial fibrillation  Assessment & Plan:  See cardiology as scheduled.  Further treatment recommendations pending lab results.          Follow Up    Return if symptoms worsen or fail to improve.  Patient was given instructions and counseling regarding her condition or for health maintenance advice. Please see specific information pulled into the AVS if appropriate.

## 2025-07-14 ENCOUNTER — CLINICAL SUPPORT (OUTPATIENT)
Dept: FAMILY MEDICINE CLINIC | Age: 70
End: 2025-07-14
Payer: MEDICARE

## 2025-07-14 VITALS — SYSTOLIC BLOOD PRESSURE: 143 MMHG | DIASTOLIC BLOOD PRESSURE: 75 MMHG | HEART RATE: 52 BPM

## 2025-07-18 ENCOUNTER — PATIENT MESSAGE (OUTPATIENT)
Dept: FAMILY MEDICINE CLINIC | Age: 70
End: 2025-07-18
Payer: MEDICARE

## 2025-07-30 ENCOUNTER — CLINICAL SUPPORT (OUTPATIENT)
Dept: FAMILY MEDICINE CLINIC | Age: 70
End: 2025-07-30
Payer: MEDICARE

## 2025-07-30 VITALS — HEART RATE: 60 BPM | DIASTOLIC BLOOD PRESSURE: 58 MMHG | SYSTOLIC BLOOD PRESSURE: 120 MMHG

## 2025-08-14 ENCOUNTER — PATIENT MESSAGE (OUTPATIENT)
Dept: FAMILY MEDICINE CLINIC | Age: 70
End: 2025-08-14
Payer: MEDICARE

## 2025-08-14 RX ORDER — AMLODIPINE BESYLATE 2.5 MG/1
2.5 TABLET ORAL DAILY
COMMUNITY
Start: 2025-07-15

## 2025-08-21 ENCOUNTER — OFFICE VISIT (OUTPATIENT)
Dept: SLEEP MEDICINE | Facility: HOSPITAL | Age: 70
End: 2025-08-21
Payer: MEDICARE

## 2025-08-21 VITALS — HEART RATE: 68 BPM | BODY MASS INDEX: 37.65 KG/M2 | OXYGEN SATURATION: 95 % | HEIGHT: 65 IN | WEIGHT: 226 LBS

## 2025-08-21 DIAGNOSIS — E66.812 CLASS 2 SEVERE OBESITY DUE TO EXCESS CALORIES WITH SERIOUS COMORBIDITY AND BODY MASS INDEX (BMI) OF 37.0 TO 37.9 IN ADULT: ICD-10-CM

## 2025-08-21 DIAGNOSIS — G47.30 SLEEP-DISORDERED BREATHING: Primary | ICD-10-CM

## 2025-08-21 DIAGNOSIS — E66.01 CLASS 2 SEVERE OBESITY DUE TO EXCESS CALORIES WITH SERIOUS COMORBIDITY AND BODY MASS INDEX (BMI) OF 37.0 TO 37.9 IN ADULT: ICD-10-CM

## 2025-08-21 DIAGNOSIS — G47.10 HYPERSOMNIA, UNSPECIFIED: ICD-10-CM

## 2025-08-21 PROCEDURE — G0463 HOSPITAL OUTPT CLINIC VISIT: HCPCS

## 2025-08-21 PROCEDURE — 99204 OFFICE O/P NEW MOD 45 MIN: CPT | Performed by: INTERNAL MEDICINE

## 2025-08-21 PROCEDURE — 1160F RVW MEDS BY RX/DR IN RCRD: CPT | Performed by: INTERNAL MEDICINE

## 2025-08-21 PROCEDURE — 1159F MED LIST DOCD IN RCRD: CPT | Performed by: INTERNAL MEDICINE

## 2025-08-23 DIAGNOSIS — I48.0 PAROXYSMAL ATRIAL FIBRILLATION: ICD-10-CM

## 2025-08-24 PROBLEM — G47.30 SLEEP-DISORDERED BREATHING: Status: ACTIVE | Noted: 2025-08-24

## 2025-08-24 PROBLEM — G47.10 HYPERSOMNIA, UNSPECIFIED: Status: ACTIVE | Noted: 2025-08-24

## 2025-08-25 RX ORDER — METOPROLOL SUCCINATE 25 MG/1
25 TABLET, EXTENDED RELEASE ORAL DAILY
Qty: 90 TABLET | Refills: 1 | OUTPATIENT
Start: 2025-08-25